# Patient Record
Sex: FEMALE | Race: WHITE | NOT HISPANIC OR LATINO | Employment: OTHER | URBAN - METROPOLITAN AREA
[De-identification: names, ages, dates, MRNs, and addresses within clinical notes are randomized per-mention and may not be internally consistent; named-entity substitution may affect disease eponyms.]

---

## 2017-01-03 ENCOUNTER — CLINICAL SUPPORT (OUTPATIENT)
Dept: INTERNAL MEDICINE | Facility: CLINIC | Age: 67
End: 2017-01-03

## 2017-01-03 DIAGNOSIS — E53.9 VITAMIN B DEFICIENCY: Primary | ICD-10-CM

## 2017-01-03 PROCEDURE — 96372 THER/PROPH/DIAG INJ SC/IM: CPT | Performed by: FAMILY MEDICINE

## 2017-01-03 RX ADMIN — CYANOCOBALAMIN 1000 MCG: 1000 INJECTION, SOLUTION INTRAMUSCULAR; SUBCUTANEOUS at 09:23

## 2017-01-20 ENCOUNTER — CLINICAL SUPPORT (OUTPATIENT)
Dept: INTERNAL MEDICINE | Facility: CLINIC | Age: 67
End: 2017-01-20

## 2017-01-20 DIAGNOSIS — E53.8 B12 DEFICIENCY: ICD-10-CM

## 2017-01-20 PROCEDURE — 96372 THER/PROPH/DIAG INJ SC/IM: CPT | Performed by: FAMILY MEDICINE

## 2017-01-20 RX ADMIN — CYANOCOBALAMIN 1000 MCG: 1000 INJECTION, SOLUTION INTRAMUSCULAR; SUBCUTANEOUS at 11:05

## 2017-02-03 ENCOUNTER — CLINICAL SUPPORT (OUTPATIENT)
Dept: INTERNAL MEDICINE | Facility: CLINIC | Age: 67
End: 2017-02-03

## 2017-02-03 DIAGNOSIS — E53.8 CYANOCOBALAMIN DEFICIENCY: Primary | ICD-10-CM

## 2017-02-03 PROCEDURE — 96372 THER/PROPH/DIAG INJ SC/IM: CPT | Performed by: FAMILY MEDICINE

## 2017-02-03 RX ORDER — CYANOCOBALAMIN 1000 UG/ML
1000 INJECTION, SOLUTION INTRAMUSCULAR; SUBCUTANEOUS
Status: DISCONTINUED | OUTPATIENT
Start: 2017-02-03 | End: 2017-05-12

## 2017-02-03 RX ADMIN — CYANOCOBALAMIN 1000 MCG: 1000 INJECTION, SOLUTION INTRAMUSCULAR; SUBCUTANEOUS at 10:48

## 2017-02-17 ENCOUNTER — CLINICAL SUPPORT (OUTPATIENT)
Dept: INTERNAL MEDICINE | Facility: CLINIC | Age: 67
End: 2017-02-17

## 2017-02-17 DIAGNOSIS — E53.8 B12 DEFICIENCY: ICD-10-CM

## 2017-02-17 PROCEDURE — 96372 THER/PROPH/DIAG INJ SC/IM: CPT | Performed by: FAMILY MEDICINE

## 2017-02-17 RX ADMIN — CYANOCOBALAMIN 1000 MCG: 1000 INJECTION, SOLUTION INTRAMUSCULAR; SUBCUTANEOUS at 10:51

## 2017-03-06 ENCOUNTER — CLINICAL SUPPORT (OUTPATIENT)
Dept: INTERNAL MEDICINE | Facility: CLINIC | Age: 67
End: 2017-03-06

## 2017-03-06 DIAGNOSIS — E53.8 B12 DEFICIENCY: Primary | ICD-10-CM

## 2017-03-06 PROCEDURE — 96372 THER/PROPH/DIAG INJ SC/IM: CPT | Performed by: FAMILY MEDICINE

## 2017-03-06 RX ADMIN — CYANOCOBALAMIN 1000 MCG: 1000 INJECTION, SOLUTION INTRAMUSCULAR; SUBCUTANEOUS at 09:45

## 2017-03-17 ENCOUNTER — CLINICAL SUPPORT (OUTPATIENT)
Dept: INTERNAL MEDICINE | Facility: CLINIC | Age: 67
End: 2017-03-17

## 2017-03-17 DIAGNOSIS — E53.8 B12 DEFICIENCY: Primary | ICD-10-CM

## 2017-03-17 PROCEDURE — 96372 THER/PROPH/DIAG INJ SC/IM: CPT | Performed by: FAMILY MEDICINE

## 2017-03-17 RX ADMIN — CYANOCOBALAMIN 1000 MCG: 1000 INJECTION, SOLUTION INTRAMUSCULAR; SUBCUTANEOUS at 09:49

## 2017-03-31 ENCOUNTER — CLINICAL SUPPORT (OUTPATIENT)
Dept: INTERNAL MEDICINE | Facility: CLINIC | Age: 67
End: 2017-03-31

## 2017-03-31 DIAGNOSIS — E53.8 B12 DEFICIENCY: Primary | ICD-10-CM

## 2017-03-31 PROCEDURE — 96372 THER/PROPH/DIAG INJ SC/IM: CPT | Performed by: FAMILY MEDICINE

## 2017-03-31 RX ADMIN — CYANOCOBALAMIN 1000 MCG: 1000 INJECTION, SOLUTION INTRAMUSCULAR; SUBCUTANEOUS at 09:19

## 2017-04-13 ENCOUNTER — CLINICAL SUPPORT (OUTPATIENT)
Dept: INTERNAL MEDICINE | Facility: CLINIC | Age: 67
End: 2017-04-13

## 2017-04-13 DIAGNOSIS — E53.9 VITAMIN B DEFICIENCY: Primary | ICD-10-CM

## 2017-04-13 PROCEDURE — 96372 THER/PROPH/DIAG INJ SC/IM: CPT | Performed by: FAMILY MEDICINE

## 2017-05-12 ENCOUNTER — CLINICAL SUPPORT (OUTPATIENT)
Dept: INTERNAL MEDICINE | Facility: CLINIC | Age: 67
End: 2017-05-12

## 2017-05-12 DIAGNOSIS — E53.8 VITAMIN B 12 DEFICIENCY: Primary | ICD-10-CM

## 2017-05-12 PROCEDURE — 96372 THER/PROPH/DIAG INJ SC/IM: CPT | Performed by: FAMILY MEDICINE

## 2017-05-12 RX ADMIN — CYANOCOBALAMIN 1000 MCG: 1000 INJECTION, SOLUTION INTRAMUSCULAR; SUBCUTANEOUS at 10:00

## 2017-06-23 ENCOUNTER — CLINICAL SUPPORT (OUTPATIENT)
Dept: INTERNAL MEDICINE | Facility: CLINIC | Age: 67
End: 2017-06-23

## 2017-06-23 DIAGNOSIS — E53.8 VITAMIN B12 DEFICIENCY: Primary | ICD-10-CM

## 2017-06-23 PROCEDURE — 96372 THER/PROPH/DIAG INJ SC/IM: CPT | Performed by: FAMILY MEDICINE

## 2017-06-23 RX ORDER — CYANOCOBALAMIN 1000 UG/ML
1000 INJECTION, SOLUTION INTRAMUSCULAR; SUBCUTANEOUS
Status: DISCONTINUED | OUTPATIENT
Start: 2017-06-23 | End: 2017-09-29 | Stop reason: SDUPTHER

## 2017-06-23 RX ADMIN — CYANOCOBALAMIN 1000 MCG: 1000 INJECTION, SOLUTION INTRAMUSCULAR; SUBCUTANEOUS at 10:01

## 2017-07-21 ENCOUNTER — CLINICAL SUPPORT (OUTPATIENT)
Dept: INTERNAL MEDICINE | Facility: CLINIC | Age: 67
End: 2017-07-21

## 2017-07-21 DIAGNOSIS — E53.8 VITAMIN B12 DEFICIENCY: Primary | ICD-10-CM

## 2017-07-21 PROCEDURE — 96372 THER/PROPH/DIAG INJ SC/IM: CPT | Performed by: FAMILY MEDICINE

## 2017-07-21 RX ADMIN — CYANOCOBALAMIN 1000 MCG: 1000 INJECTION, SOLUTION INTRAMUSCULAR; SUBCUTANEOUS at 09:13

## 2017-08-07 ENCOUNTER — CLINICAL SUPPORT (OUTPATIENT)
Dept: INTERNAL MEDICINE | Facility: CLINIC | Age: 67
End: 2017-08-07

## 2017-08-07 DIAGNOSIS — E53.8 VITAMIN B 12 DEFICIENCY: Primary | ICD-10-CM

## 2017-08-07 PROCEDURE — 96372 THER/PROPH/DIAG INJ SC/IM: CPT | Performed by: FAMILY MEDICINE

## 2017-08-07 RX ORDER — CYANOCOBALAMIN 1000 UG/ML
1000 INJECTION, SOLUTION INTRAMUSCULAR; SUBCUTANEOUS
Status: DISCONTINUED | OUTPATIENT
Start: 2017-08-07 | End: 2017-09-29 | Stop reason: SDUPTHER

## 2017-08-07 RX ADMIN — CYANOCOBALAMIN 1000 MCG: 1000 INJECTION, SOLUTION INTRAMUSCULAR; SUBCUTANEOUS at 08:53

## 2017-08-25 ENCOUNTER — CLINICAL SUPPORT (OUTPATIENT)
Dept: INTERNAL MEDICINE | Facility: CLINIC | Age: 67
End: 2017-08-25

## 2017-08-25 DIAGNOSIS — E53.8 VITAMIN B 12 DEFICIENCY: Primary | ICD-10-CM

## 2017-08-25 PROCEDURE — 96372 THER/PROPH/DIAG INJ SC/IM: CPT | Performed by: FAMILY MEDICINE

## 2017-08-25 RX ORDER — CYANOCOBALAMIN 1000 UG/ML
1000 INJECTION, SOLUTION INTRAMUSCULAR; SUBCUTANEOUS ONCE
Status: COMPLETED | OUTPATIENT
Start: 2017-08-25 | End: 2017-08-25

## 2017-08-25 RX ADMIN — CYANOCOBALAMIN 1000 MCG: 1000 INJECTION, SOLUTION INTRAMUSCULAR; SUBCUTANEOUS at 09:00

## 2017-09-27 ENCOUNTER — LAB (OUTPATIENT)
Dept: INTERNAL MEDICINE | Facility: CLINIC | Age: 67
End: 2017-09-27

## 2017-09-27 DIAGNOSIS — E03.8 OTHER SPECIFIED HYPOTHYROIDISM: ICD-10-CM

## 2017-09-27 DIAGNOSIS — E78.00 PURE HYPERCHOLESTEROLEMIA: ICD-10-CM

## 2017-09-27 DIAGNOSIS — Z00.00 ROUTINE GENERAL MEDICAL EXAMINATION AT A HEALTH CARE FACILITY: ICD-10-CM

## 2017-09-27 DIAGNOSIS — N28.9 RENAL INSUFFICIENCY, MILD: ICD-10-CM

## 2017-09-27 DIAGNOSIS — E08.29 DIABETES MELLITUS DUE TO UNDERLYING CONDITION WITH OTHER KIDNEY COMPLICATION: ICD-10-CM

## 2017-09-27 DIAGNOSIS — E53.8 B12 DEFICIENCY: Primary | ICD-10-CM

## 2017-09-27 LAB
25(OH)D3 SERPL-MCNC: 29.8 NG/ML
ALBUMIN SERPL-MCNC: 4.4 G/DL (ref 3.2–4.8)
ALBUMIN/GLOB SERPL: 1.4 G/DL (ref 1.5–2.5)
ALP SERPL-CCNC: 87 U/L (ref 25–100)
ALT SERPL W P-5'-P-CCNC: 22 U/L (ref 7–40)
ANION GAP SERPL CALCULATED.3IONS-SCNC: 9 MMOL/L (ref 3–11)
ARTICHOKE IGE QN: 90 MG/DL (ref 0–130)
AST SERPL-CCNC: 25 U/L (ref 0–33)
BILIRUB SERPL-MCNC: 0.5 MG/DL (ref 0.3–1.2)
BUN BLD-MCNC: 24 MG/DL (ref 9–23)
BUN/CREAT SERPL: 17.1 (ref 7–25)
CALCIUM SPEC-SCNC: 9.4 MG/DL (ref 8.7–10.4)
CHLORIDE SERPL-SCNC: 106 MMOL/L (ref 99–109)
CHOLEST SERPL-MCNC: 162 MG/DL (ref 0–200)
CO2 SERPL-SCNC: 24 MMOL/L (ref 20–31)
CREAT BLD-MCNC: 1.4 MG/DL (ref 0.6–1.3)
DEPRECATED RDW RBC AUTO: 44.6 FL (ref 37–54)
ERYTHROCYTE [DISTWIDTH] IN BLOOD BY AUTOMATED COUNT: 13.8 % (ref 11.3–14.5)
GFR SERPL CREATININE-BSD FRML MDRD: 37 ML/MIN/1.73
GLOBULIN UR ELPH-MCNC: 3.1 GM/DL
GLUCOSE BLD-MCNC: 89 MG/DL (ref 70–100)
HBA1C MFR BLD: 7.2 % (ref 4.8–5.6)
HCT VFR BLD AUTO: 39.3 % (ref 34.5–44)
HDLC SERPL-MCNC: 43 MG/DL (ref 40–60)
HGB BLD-MCNC: 12.8 G/DL (ref 11.5–15.5)
MCH RBC QN AUTO: 28.5 PG (ref 27–31)
MCHC RBC AUTO-ENTMCNC: 32.6 G/DL (ref 32–36)
MCV RBC AUTO: 87.5 FL (ref 80–99)
PLATELET # BLD AUTO: 212 10*3/MM3 (ref 150–450)
PMV BLD AUTO: 9.6 FL (ref 6–12)
POTASSIUM BLD-SCNC: 4.3 MMOL/L (ref 3.5–5.5)
PROT SERPL-MCNC: 7.5 G/DL (ref 5.7–8.2)
RBC # BLD AUTO: 4.49 10*6/MM3 (ref 3.89–5.14)
SODIUM BLD-SCNC: 139 MMOL/L (ref 132–146)
TRIGL SERPL-MCNC: 129 MG/DL (ref 0–150)
TSH SERPL DL<=0.05 MIU/L-ACNC: 0.44 MIU/ML (ref 0.35–5.35)
VIT B12 BLD-MCNC: 870 PG/ML (ref 211–911)
WBC NRBC COR # BLD: 8.25 10*3/MM3 (ref 3.5–10.8)

## 2017-09-27 PROCEDURE — 80053 COMPREHEN METABOLIC PANEL: CPT | Performed by: FAMILY MEDICINE

## 2017-09-27 PROCEDURE — 84443 ASSAY THYROID STIM HORMONE: CPT | Performed by: FAMILY MEDICINE

## 2017-09-27 PROCEDURE — 80061 LIPID PANEL: CPT | Performed by: FAMILY MEDICINE

## 2017-09-27 PROCEDURE — 82306 VITAMIN D 25 HYDROXY: CPT | Performed by: FAMILY MEDICINE

## 2017-09-27 PROCEDURE — 82607 VITAMIN B-12: CPT | Performed by: FAMILY MEDICINE

## 2017-09-27 PROCEDURE — 83036 HEMOGLOBIN GLYCOSYLATED A1C: CPT | Performed by: FAMILY MEDICINE

## 2017-09-27 PROCEDURE — 85027 COMPLETE CBC AUTOMATED: CPT | Performed by: FAMILY MEDICINE

## 2017-09-29 ENCOUNTER — OFFICE VISIT (OUTPATIENT)
Dept: INTERNAL MEDICINE | Facility: CLINIC | Age: 67
End: 2017-09-29

## 2017-09-29 VITALS
HEIGHT: 63 IN | DIASTOLIC BLOOD PRESSURE: 76 MMHG | SYSTOLIC BLOOD PRESSURE: 118 MMHG | TEMPERATURE: 97.5 F | WEIGHT: 205 LBS | BODY MASS INDEX: 36.32 KG/M2

## 2017-09-29 DIAGNOSIS — Z00.00 ANNUAL PHYSICAL EXAM: Primary | ICD-10-CM

## 2017-09-29 DIAGNOSIS — Z13.820 SCREENING FOR OSTEOPOROSIS: ICD-10-CM

## 2017-09-29 DIAGNOSIS — E53.8 B12 DEFICIENCY: ICD-10-CM

## 2017-09-29 DIAGNOSIS — E78.00 PURE HYPERCHOLESTEROLEMIA: ICD-10-CM

## 2017-09-29 PROCEDURE — 90662 IIV NO PRSV INCREASED AG IM: CPT | Performed by: FAMILY MEDICINE

## 2017-09-29 PROCEDURE — 99397 PER PM REEVAL EST PAT 65+ YR: CPT | Performed by: FAMILY MEDICINE

## 2017-09-29 PROCEDURE — G0008 ADMIN INFLUENZA VIRUS VAC: HCPCS | Performed by: FAMILY MEDICINE

## 2017-09-29 PROCEDURE — 96372 THER/PROPH/DIAG INJ SC/IM: CPT | Performed by: FAMILY MEDICINE

## 2017-09-29 PROCEDURE — G0009 ADMIN PNEUMOCOCCAL VACCINE: HCPCS | Performed by: FAMILY MEDICINE

## 2017-09-29 PROCEDURE — 90732 PPSV23 VACC 2 YRS+ SUBQ/IM: CPT | Performed by: FAMILY MEDICINE

## 2017-09-29 RX ORDER — ATORVASTATIN CALCIUM 80 MG/1
80 TABLET, FILM COATED ORAL DAILY
Qty: 90 TABLET | Refills: 3 | Status: SHIPPED | OUTPATIENT
Start: 2017-09-29 | End: 2018-10-03 | Stop reason: SDUPTHER

## 2017-09-29 RX ADMIN — CYANOCOBALAMIN 1000 MCG: 1000 INJECTION, SOLUTION INTRAMUSCULAR; SUBCUTANEOUS at 11:54

## 2017-10-05 ENCOUNTER — HOSPITAL ENCOUNTER (OUTPATIENT)
Dept: BONE DENSITY | Facility: HOSPITAL | Age: 67
Discharge: HOME | End: 2017-10-05
Attending: FAMILY MEDICINE | Admitting: FAMILY MEDICINE

## 2017-10-05 PROCEDURE — 77080 DXA BONE DENSITY AXIAL: CPT

## 2017-10-23 ENCOUNTER — CLINICAL SUPPORT (OUTPATIENT)
Dept: INTERNAL MEDICINE | Facility: CLINIC | Age: 67
End: 2017-10-23

## 2017-10-23 DIAGNOSIS — Z23 NEED FOR VACCINATION: Primary | ICD-10-CM

## 2017-10-23 PROCEDURE — 90715 TDAP VACCINE 7 YRS/> IM: CPT | Performed by: FAMILY MEDICINE

## 2017-10-23 PROCEDURE — 90736 HZV VACCINE LIVE SUBQ: CPT | Performed by: FAMILY MEDICINE

## 2017-10-23 PROCEDURE — 90472 IMMUNIZATION ADMIN EACH ADD: CPT | Performed by: FAMILY MEDICINE

## 2017-10-23 PROCEDURE — 90471 IMMUNIZATION ADMIN: CPT | Performed by: FAMILY MEDICINE

## 2017-10-23 PROCEDURE — 96372 THER/PROPH/DIAG INJ SC/IM: CPT | Performed by: FAMILY MEDICINE

## 2017-10-23 RX ADMIN — CYANOCOBALAMIN 1000 MCG: 1000 INJECTION, SOLUTION INTRAMUSCULAR; SUBCUTANEOUS at 12:52

## 2017-11-07 ENCOUNTER — TELEPHONE (OUTPATIENT)
Dept: INTERNAL MEDICINE | Facility: CLINIC | Age: 67
End: 2017-11-07

## 2017-11-07 DIAGNOSIS — D86.3 SARCOIDOSIS OF SKIN: Primary | ICD-10-CM

## 2017-11-07 NOTE — TELEPHONE ENCOUNTER
Spoke with Dr Joshua who advised pt had sarcoid rash on her face. Discussed that pt has seen rheumatology in past with neg w/u. Had labs 9/2017 with normal CBC and CMP (stable BUN, Cr). Will order a CXR and Tspot and do an EKG here.dennis

## 2017-11-07 NOTE — TELEPHONE ENCOUNTER
Dermatologist called regarding lesions on pt face. She stated that this will require additional work up and would like to touch base with you.

## 2017-11-20 ENCOUNTER — CLINICAL SUPPORT (OUTPATIENT)
Dept: INTERNAL MEDICINE | Facility: CLINIC | Age: 67
End: 2017-11-20

## 2017-11-20 DIAGNOSIS — E53.8 VITAMIN B 12 DEFICIENCY: Primary | ICD-10-CM

## 2017-11-20 PROCEDURE — 96372 THER/PROPH/DIAG INJ SC/IM: CPT | Performed by: FAMILY MEDICINE

## 2017-11-20 RX ADMIN — CYANOCOBALAMIN 1000 MCG: 1000 INJECTION, SOLUTION INTRAMUSCULAR; SUBCUTANEOUS at 11:07

## 2017-12-04 ENCOUNTER — CLINICAL SUPPORT (OUTPATIENT)
Dept: INTERNAL MEDICINE | Facility: CLINIC | Age: 67
End: 2017-12-04

## 2017-12-04 DIAGNOSIS — D86.3 SARCOIDOSIS OF SKIN: ICD-10-CM

## 2017-12-04 PROCEDURE — 86481 TB AG RESPONSE T-CELL SUSP: CPT | Performed by: FAMILY MEDICINE

## 2017-12-07 LAB — REF LAB TEST METHOD: NORMAL

## 2017-12-18 ENCOUNTER — CLINICAL SUPPORT (OUTPATIENT)
Dept: INTERNAL MEDICINE | Facility: CLINIC | Age: 67
End: 2017-12-18

## 2017-12-18 DIAGNOSIS — E53.8 B12 DEFICIENCY: Primary | ICD-10-CM

## 2017-12-18 PROCEDURE — 96372 THER/PROPH/DIAG INJ SC/IM: CPT | Performed by: FAMILY MEDICINE

## 2017-12-18 RX ADMIN — CYANOCOBALAMIN 1000 MCG: 1000 INJECTION, SOLUTION INTRAMUSCULAR; SUBCUTANEOUS at 12:52

## 2018-01-18 ENCOUNTER — CLINICAL SUPPORT (OUTPATIENT)
Dept: INTERNAL MEDICINE | Facility: CLINIC | Age: 68
End: 2018-01-18

## 2018-01-18 DIAGNOSIS — D86.3 DARIER-ROUSSY SARCOID: Primary | ICD-10-CM

## 2018-01-18 PROCEDURE — 96372 THER/PROPH/DIAG INJ SC/IM: CPT | Performed by: FAMILY MEDICINE

## 2018-01-18 RX ADMIN — CYANOCOBALAMIN 1000 MCG: 1000 INJECTION, SOLUTION INTRAMUSCULAR; SUBCUTANEOUS at 09:58

## 2018-02-19 ENCOUNTER — CLINICAL SUPPORT (OUTPATIENT)
Dept: INTERNAL MEDICINE | Facility: CLINIC | Age: 68
End: 2018-02-19

## 2018-02-19 DIAGNOSIS — E53.8 B12 DEFICIENCY: Primary | ICD-10-CM

## 2018-02-19 PROCEDURE — 96372 THER/PROPH/DIAG INJ SC/IM: CPT | Performed by: FAMILY MEDICINE

## 2018-02-19 RX ORDER — CYANOCOBALAMIN 1000 UG/ML
1000 INJECTION, SOLUTION INTRAMUSCULAR; SUBCUTANEOUS
Status: DISCONTINUED | OUTPATIENT
Start: 2018-02-19 | End: 2019-03-12

## 2018-02-19 RX ADMIN — CYANOCOBALAMIN 1000 MCG: 1000 INJECTION, SOLUTION INTRAMUSCULAR; SUBCUTANEOUS at 08:27

## 2018-03-19 ENCOUNTER — CLINICAL SUPPORT (OUTPATIENT)
Dept: INTERNAL MEDICINE | Facility: CLINIC | Age: 68
End: 2018-03-19

## 2018-03-19 PROCEDURE — 96372 THER/PROPH/DIAG INJ SC/IM: CPT | Performed by: FAMILY MEDICINE

## 2018-03-19 RX ORDER — NITROFURANTOIN 25; 75 MG/1; MG/1
100 CAPSULE ORAL EVERY 12 HOURS SCHEDULED
Qty: 14 CAPSULE | Refills: 0 | Status: SHIPPED | OUTPATIENT
Start: 2018-03-19 | End: 2018-03-23 | Stop reason: SDUPTHER

## 2018-03-19 RX ADMIN — CYANOCOBALAMIN 1000 MCG: 1000 INJECTION, SOLUTION INTRAMUSCULAR; SUBCUTANEOUS at 09:09

## 2018-03-23 RX ORDER — NITROFURANTOIN 25; 75 MG/1; MG/1
CAPSULE ORAL
Qty: 14 CAPSULE | Refills: 0 | Status: SHIPPED | OUTPATIENT
Start: 2018-03-23 | End: 2018-10-03

## 2018-04-02 PROCEDURE — 87086 URINE CULTURE/COLONY COUNT: CPT | Performed by: FAMILY MEDICINE

## 2018-04-23 ENCOUNTER — CLINICAL SUPPORT (OUTPATIENT)
Dept: INTERNAL MEDICINE | Facility: CLINIC | Age: 68
End: 2018-04-23

## 2018-04-23 PROCEDURE — 96372 THER/PROPH/DIAG INJ SC/IM: CPT | Performed by: FAMILY MEDICINE

## 2018-04-23 RX ADMIN — CYANOCOBALAMIN 1000 MCG: 1000 INJECTION, SOLUTION INTRAMUSCULAR; SUBCUTANEOUS at 09:49

## 2018-05-18 ENCOUNTER — CLINICAL SUPPORT (OUTPATIENT)
Dept: INTERNAL MEDICINE | Facility: CLINIC | Age: 68
End: 2018-05-18

## 2018-05-18 DIAGNOSIS — N18.30 CHRONIC KIDNEY DISEASE, STAGE III (MODERATE) (HCC): ICD-10-CM

## 2018-05-18 DIAGNOSIS — E11.29 UNCONTROLLED TYPE 2 DIABETES MELLITUS WITH MICROALBUMINURIA, UNSPECIFIED LONG TERM INSULIN USE STATUS: Primary | ICD-10-CM

## 2018-05-18 DIAGNOSIS — E11.65 UNCONTROLLED TYPE 2 DIABETES MELLITUS WITH MICROALBUMINURIA, UNSPECIFIED LONG TERM INSULIN USE STATUS: Primary | ICD-10-CM

## 2018-05-18 DIAGNOSIS — R80.9 UNCONTROLLED TYPE 2 DIABETES MELLITUS WITH MICROALBUMINURIA, UNSPECIFIED LONG TERM INSULIN USE STATUS: Primary | ICD-10-CM

## 2018-05-18 LAB
ANION GAP SERPL CALCULATED.3IONS-SCNC: 9 MMOL/L (ref 3–11)
BUN BLD-MCNC: 22 MG/DL (ref 9–23)
BUN/CREAT SERPL: 18.3 (ref 7–25)
CALCIUM SPEC-SCNC: 9.6 MG/DL (ref 8.7–10.4)
CHLORIDE SERPL-SCNC: 108 MMOL/L (ref 99–109)
CO2 SERPL-SCNC: 25 MMOL/L (ref 20–31)
CREAT BLD-MCNC: 1.2 MG/DL (ref 0.6–1.3)
GFR SERPL CREATININE-BSD FRML MDRD: 45 ML/MIN/1.73
GLUCOSE BLD-MCNC: 112 MG/DL (ref 70–100)
POTASSIUM BLD-SCNC: 4.8 MMOL/L (ref 3.5–5.5)
SODIUM BLD-SCNC: 142 MMOL/L (ref 132–146)

## 2018-05-18 PROCEDURE — 96372 THER/PROPH/DIAG INJ SC/IM: CPT | Performed by: FAMILY MEDICINE

## 2018-05-18 PROCEDURE — 80048 BASIC METABOLIC PNL TOTAL CA: CPT | Performed by: INTERNAL MEDICINE

## 2018-05-18 RX ADMIN — CYANOCOBALAMIN 1000 MCG: 1000 INJECTION, SOLUTION INTRAMUSCULAR; SUBCUTANEOUS at 11:23

## 2018-06-18 ENCOUNTER — CLINICAL SUPPORT (OUTPATIENT)
Dept: INTERNAL MEDICINE | Facility: CLINIC | Age: 68
End: 2018-06-18

## 2018-06-18 PROCEDURE — 96372 THER/PROPH/DIAG INJ SC/IM: CPT | Performed by: FAMILY MEDICINE

## 2018-06-18 RX ADMIN — CYANOCOBALAMIN 1000 MCG: 1000 INJECTION, SOLUTION INTRAMUSCULAR; SUBCUTANEOUS at 13:08

## 2018-07-20 ENCOUNTER — CLINICAL SUPPORT (OUTPATIENT)
Dept: INTERNAL MEDICINE | Facility: CLINIC | Age: 68
End: 2018-07-20

## 2018-07-20 PROCEDURE — 96372 THER/PROPH/DIAG INJ SC/IM: CPT | Performed by: FAMILY MEDICINE

## 2018-07-20 RX ADMIN — CYANOCOBALAMIN 1000 MCG: 1000 INJECTION, SOLUTION INTRAMUSCULAR; SUBCUTANEOUS at 08:53

## 2018-08-27 ENCOUNTER — CLINICAL SUPPORT (OUTPATIENT)
Dept: INTERNAL MEDICINE | Facility: CLINIC | Age: 68
End: 2018-08-27

## 2018-08-27 PROCEDURE — 96372 THER/PROPH/DIAG INJ SC/IM: CPT | Performed by: FAMILY MEDICINE

## 2018-08-27 RX ADMIN — CYANOCOBALAMIN 1000 MCG: 1000 INJECTION, SOLUTION INTRAMUSCULAR; SUBCUTANEOUS at 09:00

## 2018-08-30 ENCOUNTER — TELEPHONE (OUTPATIENT)
Dept: INTERNAL MEDICINE | Facility: CLINIC | Age: 68
End: 2018-08-30

## 2018-09-21 ENCOUNTER — CLINICAL SUPPORT (OUTPATIENT)
Dept: INTERNAL MEDICINE | Facility: CLINIC | Age: 68
End: 2018-09-21

## 2018-09-21 DIAGNOSIS — Z23 NEED FOR IMMUNIZATION AGAINST INFLUENZA: Primary | ICD-10-CM

## 2018-09-21 PROCEDURE — 96372 THER/PROPH/DIAG INJ SC/IM: CPT | Performed by: FAMILY MEDICINE

## 2018-09-21 PROCEDURE — 90662 IIV NO PRSV INCREASED AG IM: CPT | Performed by: FAMILY MEDICINE

## 2018-09-21 PROCEDURE — G0008 ADMIN INFLUENZA VIRUS VAC: HCPCS | Performed by: FAMILY MEDICINE

## 2018-09-21 RX ADMIN — CYANOCOBALAMIN 1000 MCG: 1000 INJECTION, SOLUTION INTRAMUSCULAR; SUBCUTANEOUS at 10:54

## 2018-10-03 ENCOUNTER — OFFICE VISIT (OUTPATIENT)
Dept: INTERNAL MEDICINE | Facility: CLINIC | Age: 68
End: 2018-10-03

## 2018-10-03 VITALS
DIASTOLIC BLOOD PRESSURE: 76 MMHG | SYSTOLIC BLOOD PRESSURE: 124 MMHG | BODY MASS INDEX: 36.07 KG/M2 | WEIGHT: 203.6 LBS | TEMPERATURE: 97.6 F | HEIGHT: 63 IN

## 2018-10-03 DIAGNOSIS — E78.00 PURE HYPERCHOLESTEROLEMIA: ICD-10-CM

## 2018-10-03 DIAGNOSIS — E53.8 B12 DEFICIENCY: ICD-10-CM

## 2018-10-03 DIAGNOSIS — Z00.00 ANNUAL PHYSICAL EXAM: Primary | ICD-10-CM

## 2018-10-03 LAB
ALBUMIN SERPL-MCNC: 4.6 G/DL (ref 3.2–4.8)
ALBUMIN/GLOB SERPL: 1.6 G/DL (ref 1.5–2.5)
ALP SERPL-CCNC: 82 U/L (ref 25–100)
ALT SERPL W P-5'-P-CCNC: 21 U/L (ref 7–40)
ANION GAP SERPL CALCULATED.3IONS-SCNC: 10 MMOL/L (ref 3–11)
ARTICHOKE IGE QN: 114 MG/DL (ref 0–130)
AST SERPL-CCNC: 16 U/L (ref 0–33)
BASOPHILS # BLD AUTO: 0 10*3/MM3 (ref 0–0.2)
BASOPHILS NFR BLD AUTO: 0 % (ref 0–1)
BILIRUB SERPL-MCNC: 0.5 MG/DL (ref 0.3–1.2)
BUN BLD-MCNC: 25 MG/DL (ref 9–23)
BUN/CREAT SERPL: 16.9 (ref 7–25)
CALCIUM SPEC-SCNC: 9.7 MG/DL (ref 8.7–10.4)
CHLORIDE SERPL-SCNC: 104 MMOL/L (ref 99–109)
CHOLEST SERPL-MCNC: 177 MG/DL (ref 0–200)
CO2 SERPL-SCNC: 24 MMOL/L (ref 20–31)
CREAT BLD-MCNC: 1.48 MG/DL (ref 0.6–1.3)
DEPRECATED RDW RBC AUTO: 44 FL (ref 37–54)
EOSINOPHIL # BLD AUTO: 0.39 10*3/MM3 (ref 0–0.3)
EOSINOPHIL NFR BLD AUTO: 4.3 % (ref 0–3)
ERYTHROCYTE [DISTWIDTH] IN BLOOD BY AUTOMATED COUNT: 13.8 % (ref 11.3–14.5)
GFR SERPL CREATININE-BSD FRML MDRD: 35 ML/MIN/1.73
GLOBULIN UR ELPH-MCNC: 2.8 GM/DL
GLUCOSE BLD-MCNC: 199 MG/DL (ref 70–100)
HCT VFR BLD AUTO: 41.2 % (ref 34.5–44)
HDLC SERPL-MCNC: 49 MG/DL (ref 40–60)
HGB BLD-MCNC: 13.2 G/DL (ref 11.5–15.5)
IMM GRANULOCYTES # BLD: 0.02 10*3/MM3 (ref 0–0.03)
IMM GRANULOCYTES NFR BLD: 0.2 % (ref 0–0.6)
LYMPHOCYTES # BLD AUTO: 2.71 10*3/MM3 (ref 0.6–4.8)
LYMPHOCYTES NFR BLD AUTO: 29.7 % (ref 24–44)
MCH RBC QN AUTO: 28.1 PG (ref 27–31)
MCHC RBC AUTO-ENTMCNC: 32 G/DL (ref 32–36)
MCV RBC AUTO: 87.7 FL (ref 80–99)
MONOCYTES # BLD AUTO: 0.64 10*3/MM3 (ref 0–1)
MONOCYTES NFR BLD AUTO: 7 % (ref 0–12)
NEUTROPHILS # BLD AUTO: 5.39 10*3/MM3 (ref 1.5–8.3)
NEUTROPHILS NFR BLD AUTO: 59 % (ref 41–71)
PLATELET # BLD AUTO: 255 10*3/MM3 (ref 150–450)
PMV BLD AUTO: 10.2 FL (ref 6–12)
POTASSIUM BLD-SCNC: 4.5 MMOL/L (ref 3.5–5.5)
PROT SERPL-MCNC: 7.4 G/DL (ref 5.7–8.2)
RBC # BLD AUTO: 4.7 10*6/MM3 (ref 3.89–5.14)
SODIUM BLD-SCNC: 138 MMOL/L (ref 132–146)
TRIGL SERPL-MCNC: 111 MG/DL (ref 0–150)
TSH SERPL DL<=0.05 MIU/L-ACNC: 0.17 MIU/ML (ref 0.35–5.35)
VIT B12 BLD-MCNC: 785 PG/ML (ref 211–911)
WBC NRBC COR # BLD: 9.13 10*3/MM3 (ref 3.5–10.8)

## 2018-10-03 PROCEDURE — 99397 PER PM REEVAL EST PAT 65+ YR: CPT | Performed by: FAMILY MEDICINE

## 2018-10-03 PROCEDURE — 80061 LIPID PANEL: CPT | Performed by: FAMILY MEDICINE

## 2018-10-03 PROCEDURE — 85025 COMPLETE CBC W/AUTO DIFF WBC: CPT | Performed by: FAMILY MEDICINE

## 2018-10-03 PROCEDURE — 90471 IMMUNIZATION ADMIN: CPT | Performed by: FAMILY MEDICINE

## 2018-10-03 PROCEDURE — 82607 VITAMIN B-12: CPT | Performed by: FAMILY MEDICINE

## 2018-10-03 PROCEDURE — 90670 PCV13 VACCINE IM: CPT | Performed by: FAMILY MEDICINE

## 2018-10-03 PROCEDURE — 80053 COMPREHEN METABOLIC PANEL: CPT | Performed by: FAMILY MEDICINE

## 2018-10-03 PROCEDURE — 84443 ASSAY THYROID STIM HORMONE: CPT | Performed by: FAMILY MEDICINE

## 2018-10-03 RX ORDER — ATORVASTATIN CALCIUM 80 MG/1
80 TABLET, FILM COATED ORAL DAILY
Qty: 90 TABLET | Refills: 3 | Status: SHIPPED | OUTPATIENT
Start: 2018-10-03 | End: 2021-02-17 | Stop reason: SDUPTHER

## 2018-10-03 NOTE — PROGRESS NOTES
Subjective   Mendy Merritt is a 68 y.o. female.   History of Present Illness   Here for CPE. Last seen 9/29/17 for CPE.  Was seen 2/8/16 with sinusitis, treated with biaxin. Pt sees endocrinology. Labs 9/29/17 demo normal CBC, CMP (glu 89, Cr 1.4), TSH (0.443 on synthroid), B12 (870 on shots q2wk), vit D 29.8. Lipid at goal on lipitor with , tg 129, HDL 43, LDL 90. A1C was 7.2. Last EKG 6/2012 normal.     1.ENDO- B12 defic, hypothyroid and NIDDM. Pt was getting B12 shots here q2wk. Otherwise has treated with endo. Was released by nephrology when her renal function improved with adequate diabetes control. Is on losartan for renal protection. Had neg w/u by rheumatology (+CESAR but otherwise neg). B12 has been at goal. Today she reports she is now doing her shots monthly and doing well. Last was about a week ago. Does need her TSH drawn and sent to Dr Dow.      2. CV- hyperlipid. At goal on Lipitor. No known cardio sequelae. Is on cozaar for renal protection. Today pt reports she is still on lipitor with no cardio symptoms.      3. CPE- previous 9/29/17  LMP: 54yo. No DUB or persistent hot flashes.  Last mammo: [place] in 1998. Had a bad experience and has declined since then.  No current vaginal, bladder or breast c/o.  Previous BDT: 10/4/17 normal  Previous colonoscopy: 52yo in [place]. No polyps. Cologuard 11/14/17 neg  Immunizations: Last Tdap: 10/23/17. Pneumovax: 9/29/17. Prevnar 13: DUE. Zostavax: 10/23/17.  Had shingles around 2014. Had her flu shot this year.    The following portions of the patient's history were reviewed and updated as appropriate: current medications, past family history, past medical history, past social history, past surgical history and problem list.    Review of Systems   Cardiovascular: Negative for chest pain.   Gastrointestinal: Negative for abdominal distention and abdominal pain.   Skin: Negative for color change.   Neurological: Negative for tremors, speech difficulty and headaches.  "  Psychiatric/Behavioral: Negative for agitation and confusion.   All other systems reviewed and are negative.        Current Outpatient Prescriptions:   •  aspirin 81 MG EC tablet, Take  by mouth., Disp: , Rfl:   •  atorvastatin (LIPITOR) 80 MG tablet, Take 1 tablet by mouth Daily., Disp: 90 tablet, Rfl: 3  •  clobetasol (TEMOVATE) 0.05 % ointment, Apply  topically 2 (two) times a day., Disp: , Rfl:   •  diclofenac (VOLTAREN) 1 % gel gel, Apply 4g to area of pain or inflammation q8hr prn, Disp: 100 g, Rfl: 0  •  FREESTYLE LITE test strip, USE 3 TIMES A DAY AS DIRECTED, Disp: , Rfl: 3  •  glipiZIDE (GLUCOTROL) 10 MG 24 hr tablet, Take  by mouth., Disp: , Rfl:   •  levothyroxine (SYNTHROID, LEVOTHROID) 137 MCG tablet, Take  by mouth., Disp: , Rfl:   •  losartan (COZAAR) 50 MG tablet, , Disp: , Rfl:   •  metFORMIN XR (GLUCOPHATE-XR) 500 MG 24 hr tablet, Take  by mouth., Disp: , Rfl:   •  TRESIBA FLEXTOUCH 100 UNIT/ML solution pen-injector, INJECT 28 UNITS EVERY EVENING TITRATE UP AS DIRECTED TO MAX OF 45 UNITS EVERY DAY, Disp: , Rfl: 5  •  VICTOZA 18 MG/3ML solution pen-injector, , Disp: , Rfl:     Current Facility-Administered Medications:   •  cyanocobalamin injection 1,000 mcg, 1,000 mcg, Intramuscular, Q14 Days, Akilah Becker MD, 1,000 mcg at 09/21/18 1054  •  cyanocobalamin injection 1,000 mcg, 1,000 mcg, Intramuscular, Q28 Days, Akilah Becker MD, 1,000 mcg at 06/18/18 1308    Objective     /76   Temp 97.6 °F (36.4 °C)   Ht 160 cm (63\")   Wt 92.4 kg (203 lb 9.6 oz)   BMI 36.07 kg/m²     Physical Exam   Constitutional: She is oriented to person, place, and time. She appears well-developed and well-nourished.   HENT:   Head: Normocephalic.   Right Ear: Tympanic membrane and ear canal normal.   Left Ear: Tympanic membrane and ear canal normal.   Nose: Nose normal.   Mouth/Throat: Oropharynx is clear and moist.   Eyes: Pupils are equal, round, and reactive to light. Conjunctivae and EOM are " normal.   Neck: Normal range of motion. Neck supple. No thyromegaly present.   Cardiovascular: Normal rate, regular rhythm and normal heart sounds.    Pulmonary/Chest: Effort normal and breath sounds normal. Right breast exhibits no mass, no nipple discharge and no skin change. Left breast exhibits no mass, no nipple discharge and no skin change.   Abdominal: Soft. Bowel sounds are normal. She exhibits no distension. There is no tenderness.   Musculoskeletal: Normal range of motion.   Lymphadenopathy:     She has no cervical adenopathy.   Neurological: She is alert and oriented to person, place, and time.   Skin: Skin is warm and dry.   Psychiatric: She has a normal mood and affect. Her behavior is normal.   Nursing note and vitals reviewed.      Reviewed the following with the patient: Discussion today with patient regarding current guidelines for timing/ frequency of paps, mammograms, colonoscopy (or cologuard), bone density tests and lab tests.     Immunizations discussed today with current recommendations advised for DTaP, Zostavax, Pneumovax and Prevnar 13.    Appropriate diet and stretching discussed with handouts provided.      Assessment/Plan   Mendy was seen today for annual exam.    Diagnoses and all orders for this visit:    Annual physical exam  -     CBC & Differential  -     Comprehensive Metabolic Panel  -     Lipid Panel  -     Vitamin B12  -     CBC Auto Differential  -     Pneumococcal Conjugate Vaccine 13-[facility] All  -     TSH    B12 deficiency    Pure hypercholesterolemia  -     atorvastatin (LIPITOR) 80 MG tablet; Take 1 tablet by mouth Daily.      1. CPE- screening labs today. Prevnar 13 today which will complete her pneumonia shots.   2. ENDO- B12 defic- will check her level today. Pt is then to try the OTC sublingual B12. Will then recheck her B12 in 1mo (lab only). If not at goal, will return to the shots. Discussed the zostarix shot (series of 2, done at pharmacy for shingles).  3. CV-  hyperlipid- will check her lipid today. Expect it to be at goal. Will RF x1yr.

## 2018-10-03 NOTE — PATIENT INSTRUCTIONS
1. CPE- screening labs today. Prevnar 13 today which will complete her pneumonia shots.   2. ENDO- B12 defic- will check her level today. Pt is then to try the OTC sublingual B12. Will then recheck her B12 in 1mo (lab only). If not at goal, will return to the shots. Discussed the zostarix shot (series of 2, done at pharmacy for shingles).  3. CV- hyperlipid- will check her lipid today. Expect it to be at goal. Will RF x1yr.

## 2018-11-09 ENCOUNTER — TELEPHONE (OUTPATIENT)
Dept: INTERNAL MEDICINE | Facility: CLINIC | Age: 68
End: 2018-11-09

## 2018-11-09 NOTE — TELEPHONE ENCOUNTER
Pt called stating that she rec'd the prevnar 13 vaccination on 10/3/18 and still has a knot on her arm. Pt states that the day after the injection the knot was the size of a hard boiled egg at injection site. She says that the size of the knot is now more like a grain but she wanted to be sure that it was documented in her chart.

## 2018-11-13 ENCOUNTER — OFFICE VISIT (OUTPATIENT)
Dept: INTERNAL MEDICINE | Facility: CLINIC | Age: 68
End: 2018-11-13

## 2018-11-13 VITALS — HEIGHT: 63 IN | BODY MASS INDEX: 35.79 KG/M2 | WEIGHT: 202 LBS | TEMPERATURE: 97.7 F

## 2018-11-13 DIAGNOSIS — J30.9 CHRONIC ALLERGIC RHINITIS: ICD-10-CM

## 2018-11-13 DIAGNOSIS — R21 RASH: Primary | ICD-10-CM

## 2018-11-13 PROCEDURE — 99213 OFFICE O/P EST LOW 20 MIN: CPT | Performed by: FAMILY MEDICINE

## 2018-11-13 NOTE — PATIENT INSTRUCTIONS
1. RESP- allergies- advised to use xyzal to stop the PND. Advised to use mucinex DM for the already present mucinex and cough. To call if phlegm turns discolored and will send in antibiotic.   2. DERM- rash- discussed that her rash may be a butterfly rash which would be c/w Lupus. She needs to return to Derm for a biopsy now.   3. RECHECK- prn

## 2018-11-13 NOTE — PROGRESS NOTES
Subjective   Mendy Merritt is a 68 y.o. female.     History of Present Illness   Here today as a work in for cough and facial swelling. Last seen 10/3/18 for CPE and annual routine. Pt sees endo for NIDDM and hypothyroid.     RESP- today pt reports that her arm swelling 5wk ago after her prevnar-13 shot. Tends to react to shots. She has been coughing for 9-10 days. Is having a hard time clearing the phlegm but it is clear when she does. No sinus symptoms. Has not taken anything for the cough.     DERM- facial rash. Today pt reports her rosacea is flaring and not responding to metrogel. She has clobetasol for other issues and she used this here and it seems to help some.    The following portions of the patient's history were reviewed and updated as appropriate: current medications, past family history, past medical history, past social history, past surgical history and problem list.    Review of Systems   HENT: Positive for facial swelling.    Respiratory: Positive for cough.    Cardiovascular: Negative for chest pain.   Gastrointestinal: Negative for abdominal distention and abdominal pain.   Skin: Negative for color change.   Neurological: Negative for tremors, speech difficulty and headaches.   Psychiatric/Behavioral: Negative for agitation and confusion.   All other systems reviewed and are negative.        Current Outpatient Medications:   •  aspirin 81 MG EC tablet, Take  by mouth., Disp: , Rfl:   •  atorvastatin (LIPITOR) 80 MG tablet, Take 1 tablet by mouth Daily., Disp: 90 tablet, Rfl: 3  •  clobetasol (TEMOVATE) 0.05 % ointment, Apply  topically 2 (two) times a day., Disp: , Rfl:   •  diclofenac (VOLTAREN) 1 % gel gel, Apply 4g to area of pain or inflammation q8hr prn, Disp: 100 g, Rfl: 0  •  FREESTYLE LITE test strip, USE 3 TIMES A DAY AS DIRECTED, Disp: , Rfl: 3  •  glipiZIDE (GLUCOTROL) 10 MG 24 hr tablet, Take  by mouth., Disp: , Rfl:   •  levothyroxine (SYNTHROID, LEVOTHROID) 137 MCG tablet, Take  by  "mouth., Disp: , Rfl:   •  losartan (COZAAR) 50 MG tablet, , Disp: , Rfl:   •  metFORMIN XR (GLUCOPHATE-XR) 500 MG 24 hr tablet, Take  by mouth., Disp: , Rfl:   •  TRESIBA FLEXTOUCH 100 UNIT/ML solution pen-injector, INJECT 28 UNITS EVERY EVENING TITRATE UP AS DIRECTED TO MAX OF 45 UNITS EVERY DAY, Disp: , Rfl: 5  •  VICTOZA 18 MG/3ML solution pen-injector, , Disp: , Rfl:     Current Facility-Administered Medications:   •  cyanocobalamin injection 1,000 mcg, 1,000 mcg, Intramuscular, Q14 Days, Akilah Becker MD, 1,000 mcg at 09/21/18 1054  •  cyanocobalamin injection 1,000 mcg, 1,000 mcg, Intramuscular, Q28 Days, Akilah Becker MD, 1,000 mcg at 06/18/18 1308    Objective     Temp 97.7 °F (36.5 °C)   Ht 160 cm (63\")   Wt 91.6 kg (202 lb)   BMI 35.78 kg/m²     Physical Exam   Constitutional: She is oriented to person, place, and time. She appears well-developed and well-nourished.   HENT:   Right Ear: Tympanic membrane and ear canal normal.   Left Ear: Tympanic membrane and ear canal normal.   Mouth/Throat: Oropharynx is clear and moist.   Eyes: Conjunctivae and EOM are normal. Pupils are equal, round, and reactive to light.   Neck: No thyromegaly present.   Cardiovascular: Normal rate and regular rhythm.   Pulmonary/Chest: Effort normal and breath sounds normal.   Neurological: She is alert and oriented to person, place, and time.   Skin: Skin is warm and dry.   Rash across nose to cheeks that is violaceous, worse on left   Psychiatric: She has a normal mood and affect.   Vitals reviewed.      Assessment/Plan   Mendy was seen today for cough.    Diagnoses and all orders for this visit:    Rash    Chronic allergic rhinitis        1. RESP- allergies- advised to use xyzal to stop the PND. Advised to use mucinex DM for the already present mucinex and cough. To call if phlegm turns discolored and will send in antibiotic.   2. DERM- rash- discussed that her rash may be a butterfly rash which would be c/w " Lupus. She needs to return to Derm for a biopsy now.   3. RECHECK- prn

## 2019-02-04 ENCOUNTER — TELEPHONE (OUTPATIENT)
Dept: INTERNAL MEDICINE | Facility: CLINIC | Age: 69
End: 2019-02-04

## 2019-02-04 NOTE — TELEPHONE ENCOUNTER
Pt is taking mucinex dm and said its not working too much and wanted to know what else she should do or what should she take? Please give pt a call.

## 2019-02-07 ENCOUNTER — OFFICE VISIT (OUTPATIENT)
Dept: INTERNAL MEDICINE | Facility: CLINIC | Age: 69
End: 2019-02-07

## 2019-02-07 ENCOUNTER — TELEPHONE (OUTPATIENT)
Dept: INTERNAL MEDICINE | Facility: CLINIC | Age: 69
End: 2019-02-07

## 2019-02-07 VITALS
BODY MASS INDEX: 35.65 KG/M2 | HEART RATE: 91 BPM | TEMPERATURE: 97.4 F | OXYGEN SATURATION: 97 % | HEIGHT: 63 IN | WEIGHT: 201.2 LBS

## 2019-02-07 DIAGNOSIS — J40 BRONCHITIS: Primary | ICD-10-CM

## 2019-02-07 PROCEDURE — 96372 THER/PROPH/DIAG INJ SC/IM: CPT | Performed by: FAMILY MEDICINE

## 2019-02-07 PROCEDURE — 99213 OFFICE O/P EST LOW 20 MIN: CPT | Performed by: FAMILY MEDICINE

## 2019-02-07 RX ORDER — FLUCONAZOLE 150 MG/1
150 TABLET ORAL ONCE
Qty: 1 TABLET | Refills: 0 | Status: SHIPPED | OUTPATIENT
Start: 2019-02-07 | End: 2019-02-07

## 2019-02-07 RX ORDER — ALBUTEROL SULFATE 90 UG/1
AEROSOL, METERED RESPIRATORY (INHALATION)
Qty: 18 G | Refills: 0 | Status: SHIPPED | OUTPATIENT
Start: 2019-02-07 | End: 2019-10-22

## 2019-02-07 RX ORDER — TRIAMCINOLONE ACETONIDE 40 MG/ML
40 INJECTION, SUSPENSION INTRA-ARTICULAR; INTRAMUSCULAR ONCE
Status: COMPLETED | OUTPATIENT
Start: 2019-02-07 | End: 2019-02-07

## 2019-02-07 RX ADMIN — TRIAMCINOLONE ACETONIDE 40 MG: 40 INJECTION, SUSPENSION INTRA-ARTICULAR; INTRAMUSCULAR at 11:59

## 2019-02-07 NOTE — TELEPHONE ENCOUNTER
[facility] CALLED AND WANTED TO SPEAK WITH YOU ABOUT THE PATIENTS PRESCRIPTION BEING A INTERACTION WITH HER ATORVASTATIN. PLEASE GIVE [facility] A CALL

## 2019-02-07 NOTE — TELEPHONE ENCOUNTER
I spoke with pharmacist and advised that pt has been told to hold atorvastatin while taking biaxin and rx is okay to fill.

## 2019-02-07 NOTE — PATIENT INSTRUCTIONS
1. RESP- bronchitis with wheezing. Will treat with kenalog, biaxin and inhaler. Will cover with diflucan. Pt advised to hold the atorvastatin for the week she is on the antibiotic.   2. RECHECK- prn

## 2019-02-07 NOTE — PROGRESS NOTES
Subjective   Mendy Merritt is a 68 y.o. female.     History of Present Illness   Here today as a work in for cough. Last seen 11/13/18 as a work in for cough and facial rash.     RESP- pt was seen 11/13/18 for cough for 10 days. No sinus symptoms other than PND. Phlegm was clear. Was advised xyzal. Today pt reports she has been sick for 3wk. Was getting better but then got worse again tis past weekend. Has rib soreness from coughing so much; mostly non productive. Nasal discharge is yellow. Feels congested in her throat. No wheezing or dyspnea.     The following portions of the patient's history were reviewed and updated as appropriate: current medications, past family history, past medical history, past social history, past surgical history and problem list.    Review of Systems   Constitutional: Positive for appetite change (loss of appetite).   HENT: Positive for rhinorrhea.    Respiratory: Positive for cough.    Cardiovascular: Negative for chest pain.   Gastrointestinal: Negative for abdominal distention and abdominal pain.   Skin: Negative for color change.   Neurological: Negative for tremors, speech difficulty and headaches.   Psychiatric/Behavioral: Negative for agitation and confusion.   All other systems reviewed and are negative.         Current Outpatient Medications:   •  albuterol sulfate  (90 Base) MCG/ACT inhaler, 2 puffs q4-6 hr prn cough, shortness of breath or wheezing, Disp: 18 g, Rfl: 0  •  aspirin 81 MG EC tablet, Take  by mouth., Disp: , Rfl:   •  atorvastatin (LIPITOR) 80 MG tablet, Take 1 tablet by mouth Daily., Disp: 90 tablet, Rfl: 3  •  clarithromycin XL (BIAXIN XL) 500 MG 24 hr tablet, Take 2 tablets by mouth Daily., Disp: 14 tablet, Rfl: 0  •  clobetasol (TEMOVATE) 0.05 % ointment, Apply  topically 2 (two) times a day., Disp: , Rfl:   •  diclofenac (VOLTAREN) 1 % gel gel, Apply 4g to area of pain or inflammation q8hr prn, Disp: 100 g, Rfl: 0  •  fluconazole (DIFLUCAN) 150 MG  "tablet, Take 1 tablet by mouth 1 (One) Time for 1 dose., Disp: 1 tablet, Rfl: 0  •  FREESTYLE LITE test strip, USE 3 TIMES A DAY AS DIRECTED, Disp: , Rfl: 3  •  glipiZIDE (GLUCOTROL) 10 MG 24 hr tablet, Take  by mouth., Disp: , Rfl:   •  levothyroxine (SYNTHROID, LEVOTHROID) 137 MCG tablet, Take  by mouth., Disp: , Rfl:   •  losartan (COZAAR) 50 MG tablet, , Disp: , Rfl:   •  metFORMIN XR (GLUCOPHATE-XR) 500 MG 24 hr tablet, Take  by mouth., Disp: , Rfl:   •  TRESIBA FLEXTOUCH 100 UNIT/ML solution pen-injector, INJECT 28 UNITS EVERY EVENING TITRATE UP AS DIRECTED TO MAX OF 45 UNITS EVERY DAY, Disp: , Rfl: 5  •  VICTOZA 18 MG/3ML solution pen-injector, , Disp: , Rfl:     Current Facility-Administered Medications:   •  cyanocobalamin injection 1,000 mcg, 1,000 mcg, Intramuscular, Q14 Days, Akilah Becker MD, 1,000 mcg at 09/21/18 1054  •  cyanocobalamin injection 1,000 mcg, 1,000 mcg, Intramuscular, Q28 Days, Akilah Becker MD, 1,000 mcg at 06/18/18 1308  •  triamcinolone acetonide (KENALOG-40) injection 40 mg, 40 mg, Intramuscular, Once, Akilah Becker MD    Objective     Pulse 91   Temp 97.4 °F (36.3 °C)   Ht 160 cm (63\")   Wt 91.3 kg (201 lb 3.2 oz)   SpO2 97%   BMI 35.64 kg/m²     Physical Exam   Constitutional: She is oriented to person, place, and time. She appears well-developed and well-nourished.   HENT:   Right Ear: Tympanic membrane and ear canal normal.   Left Ear: Tympanic membrane and ear canal normal.   Mouth/Throat: Oropharynx is clear and moist.   Eyes: Conjunctivae and EOM are normal. Pupils are equal, round, and reactive to light.   Neck: No thyromegaly present.   Cardiovascular: Normal rate and regular rhythm.   Pulmonary/Chest: Effort normal. She has wheezes.   Neurological: She is alert and oriented to person, place, and time.   Skin: Skin is warm and dry.   Psychiatric: She has a normal mood and affect.   Vitals reviewed.      Assessment/Plan   Mendy was seen today for " cough.    Diagnoses and all orders for this visit:    Bronchitis  -     triamcinolone acetonide (KENALOG-40) injection 40 mg; Inject 1 mL into the appropriate muscle as directed by prescriber 1 (One) Time.  -     albuterol sulfate  (90 Base) MCG/ACT inhaler; 2 puffs q4-6 hr prn cough, shortness of breath or wheezing  -     clarithromycin XL (BIAXIN XL) 500 MG 24 hr tablet; Take 2 tablets by mouth Daily.  -     fluconazole (DIFLUCAN) 150 MG tablet; Take 1 tablet by mouth 1 (One) Time for 1 dose.        1. RESP- bronchitis with wheezing. Will treat with kenalog, biaxin and inhaler. Will cover with diflucan. Pt advised to hold the atorvastatin for the week she is on the antibiotic.   2. RECHECK- prn

## 2019-02-15 ENCOUNTER — TELEPHONE (OUTPATIENT)
Dept: INTERNAL MEDICINE | Facility: CLINIC | Age: 69
End: 2019-02-15

## 2019-02-15 ENCOUNTER — OFFICE VISIT (OUTPATIENT)
Dept: INTERNAL MEDICINE | Facility: CLINIC | Age: 69
End: 2019-02-15

## 2019-02-15 VITALS
BODY MASS INDEX: 35.61 KG/M2 | TEMPERATURE: 97.2 F | WEIGHT: 201 LBS | HEART RATE: 106 BPM | HEIGHT: 63 IN | OXYGEN SATURATION: 96 %

## 2019-02-15 DIAGNOSIS — J40 BRONCHITIS: ICD-10-CM

## 2019-02-15 PROCEDURE — 99213 OFFICE O/P EST LOW 20 MIN: CPT | Performed by: FAMILY MEDICINE

## 2019-02-15 RX ORDER — METHYLPREDNISOLONE 4 MG/1
4 TABLET ORAL DAILY
Qty: 5 TABLET | Refills: 0 | Status: SHIPPED | OUTPATIENT
Start: 2019-02-15 | End: 2019-03-12

## 2019-02-15 RX ORDER — CANAGLIFLOZIN 100 MG/1
TABLET, FILM COATED ORAL
Refills: 1 | COMMUNITY
Start: 2019-01-23 | End: 2019-11-05

## 2019-02-15 NOTE — PROGRESS NOTES
Subjective   Mendy Merritt is a 68 y.o. female.     History of Present Illness   Here today as a work in for cough and congestion. Last seen 2/7/19 for bronchitis with wheezing, treated with biasin, kenalog and inhaler.    RESP- today pt reports she is improving but now she has laryngitis. No sinus congestion but still has PND. Coughed up some green yesterday, otherwise it has been clear.    The following portions of the patient's history were reviewed and updated as appropriate: current medications, past family history, past medical history, past social history, past surgical history and problem list.    Review of Systems   HENT: Positive for congestion.    Respiratory: Positive for cough.    Cardiovascular: Negative for chest pain.   Gastrointestinal: Negative for abdominal distention and abdominal pain.   Skin: Negative for color change.   Neurological: Negative for tremors, speech difficulty and headaches.   Psychiatric/Behavioral: Negative for agitation and confusion.   All other systems reviewed and are negative.        Current Outpatient Medications:   •  albuterol sulfate  (90 Base) MCG/ACT inhaler, 2 puffs q4-6 hr prn cough, shortness of breath or wheezing, Disp: 18 g, Rfl: 0  •  aspirin 81 MG EC tablet, Take  by mouth., Disp: , Rfl:   •  atorvastatin (LIPITOR) 80 MG tablet, Take 1 tablet by mouth Daily., Disp: 90 tablet, Rfl: 3  •  clarithromycin XL (BIAXIN XL) 500 MG 24 hr tablet, Take 2 tablets by mouth Daily., Disp: 14 tablet, Rfl: 0  •  clobetasol (TEMOVATE) 0.05 % ointment, Apply  topically 2 (two) times a day., Disp: , Rfl:   •  diclofenac (VOLTAREN) 1 % gel gel, Apply 4g to area of pain or inflammation q8hr prn, Disp: 100 g, Rfl: 0  •  FREESTYLE LITE test strip, USE 3 TIMES A DAY AS DIRECTED, Disp: , Rfl: 3  •  glipiZIDE (GLUCOTROL) 10 MG 24 hr tablet, Take  by mouth., Disp: , Rfl:   •  INVOKANA 100 MG tablet, TAKE 1 TABLET BY MOUTH EVERY DAY IN THE MORNING, Disp: , Rfl: 1  •  levothyroxine  "(SYNTHROID, LEVOTHROID) 137 MCG tablet, Take  by mouth., Disp: , Rfl:   •  losartan (COZAAR) 50 MG tablet, , Disp: , Rfl:   •  metFORMIN XR (GLUCOPHATE-XR) 500 MG 24 hr tablet, Take  by mouth., Disp: , Rfl:   •  methylPREDNISolone (MEDROL) 4 MG tablet, Take 1 tablet by mouth Daily., Disp: 5 tablet, Rfl: 0  •  TRESIBA FLEXTOUCH 100 UNIT/ML solution pen-injector, INJECT 28 UNITS EVERY EVENING TITRATE UP AS DIRECTED TO MAX OF 45 UNITS EVERY DAY, Disp: , Rfl: 5  •  triazolam (HALCION) 0.25 MG tablet, TAKE 2 TABLETS BY MOUTH 1 HOUR PRIOR TO DENTAL VISIT, Disp: , Rfl: 0  •  VICTOZA 18 MG/3ML solution pen-injector, , Disp: , Rfl:     Current Facility-Administered Medications:   •  cyanocobalamin injection 1,000 mcg, 1,000 mcg, Intramuscular, Q14 Days, Akilah Becker MD, 1,000 mcg at 09/21/18 1054  •  cyanocobalamin injection 1,000 mcg, 1,000 mcg, Intramuscular, Q28 Days, Akilah Becker MD, 1,000 mcg at 06/18/18 1308    Objective     Pulse 106   Temp 97.2 °F (36.2 °C)   Ht 160 cm (63\")   Wt 91.2 kg (201 lb)   SpO2 96%   BMI 35.61 kg/m²     Physical Exam   Constitutional: She is oriented to person, place, and time. She appears well-developed and well-nourished.   HENT:   Right Ear: Tympanic membrane and ear canal normal.   Left Ear: Tympanic membrane and ear canal normal.   Mouth/Throat: Oropharynx is clear and moist.   Eyes: Conjunctivae and EOM are normal. Pupils are equal, round, and reactive to light.   Neck: No thyromegaly present.   Cardiovascular: Normal rate and regular rhythm.   Pulmonary/Chest: Effort normal and breath sounds normal.   Neurological: She is alert and oriented to person, place, and time.   Skin: Skin is warm and dry.   Psychiatric: She has a normal mood and affect.   Vitals reviewed.      Assessment/Plan   Mendy was seen today for illness.    Diagnoses and all orders for this visit:    Bronchitis  -     clarithromycin XL (BIAXIN XL) 500 MG 24 hr tablet; Take 2 tablets by mouth " Daily.  -     methylPREDNISolone (MEDROL) 4 MG tablet; Take 1 tablet by mouth Daily.        1. RESP- sinusitis- will repeat the biaxin and pt will continue to hold the lipitor while on the antibiotic. Will provided with 5 days of medrol to use prn.  2. RECHECK- prn

## 2019-02-15 NOTE — TELEPHONE ENCOUNTER
Pt is just wanting to no if Dr. Becker can take a listen to her because she dont think she has cleared up from her bronchitis from last Friday.

## 2019-02-15 NOTE — PATIENT INSTRUCTIONS
1. RESP- sinusitis- will repeat the biaxin and pt will continue to hold the lipitor while on the antibiotic. Will provided with 5 days of medrol to use prn.  2. RECHECK- prn

## 2019-03-11 ENCOUNTER — TELEPHONE (OUTPATIENT)
Dept: INTERNAL MEDICINE | Facility: CLINIC | Age: 69
End: 2019-03-11

## 2019-03-11 NOTE — TELEPHONE ENCOUNTER
"Please ask pt if she can come in for an appointment at 2:30 tomorrow as she will have to be seen by Dr. Becker as this has been ongoing. Just let me know and I\"ll put her on the schedule. Thanks!   "

## 2019-03-11 NOTE — TELEPHONE ENCOUNTER
Pt is calling because  something in her mouth feels sliky inside and can taste anything. Pt said that her cold is coming back. Pt wants you to call her back

## 2019-03-12 ENCOUNTER — TELEPHONE (OUTPATIENT)
Dept: INTERNAL MEDICINE | Facility: CLINIC | Age: 69
End: 2019-03-12

## 2019-03-12 ENCOUNTER — OFFICE VISIT (OUTPATIENT)
Dept: INTERNAL MEDICINE | Facility: CLINIC | Age: 69
End: 2019-03-12

## 2019-03-12 VITALS — HEIGHT: 63 IN | TEMPERATURE: 97.7 F | BODY MASS INDEX: 35.37 KG/M2 | WEIGHT: 199.6 LBS

## 2019-03-12 DIAGNOSIS — B37.0 THRUSH: ICD-10-CM

## 2019-03-12 DIAGNOSIS — Z87.09 HISTORY OF FREQUENT URI: ICD-10-CM

## 2019-03-12 DIAGNOSIS — R49.0 HOARSENESS OF VOICE: Primary | ICD-10-CM

## 2019-03-12 PROCEDURE — 99214 OFFICE O/P EST MOD 30 MIN: CPT | Performed by: NURSE PRACTITIONER

## 2019-03-12 NOTE — PROGRESS NOTES
Subjective   Mendy Merritt is a 68 y.o. female here today for illness.    Chief Complaint   Patient presents with   • Illness     Mendy reports that she does not typically get ill that often but since November she has been ill.  She was seen on 2/7 by Dr. Becker and diagnosed with bronchitis- given biaxin and a kenalog injection.  She did not fully improve and was given more biaxin and steroid dose pack. She reports that the past few days she has a film in her mouth now and is having more nasal congestion.  She also is not able to taste anything.  She reports that mouth wash is making her mouth burn now.  She is also using baking soda mouth washes.  Her voice has never returned.  She remains hoarse.   She did get her TDAP, flu and PNA shot.  She does have a history of sarcoidosis.    Review of Systems   Constitutional: Negative for activity change, appetite change, chills, fatigue and fever.   HENT: Positive for congestion. Negative for ear discharge, ear pain, mouth sores, postnasal drip, rhinorrhea, sinus pressure, sore throat and voice change.    Eyes: Negative for photophobia, redness and itching.   Respiratory: Positive for cough. Negative for shortness of breath and wheezing.    Cardiovascular: Negative for chest pain and leg swelling.   Musculoskeletal: Negative for arthralgias and myalgias.   Skin: Negative for color change and rash.   Allergic/Immunologic: Negative for environmental allergies and immunocompromised state.   Neurological: Negative for dizziness, weakness and headache.   Hematological: Negative for adenopathy. Does not bruise/bleed easily.       Past Medical History:   Diagnosis Date   • Abnormal blood chemistry test    • Allergic rhinitis    • Anemia, B12 deficiency    • Arthritis    • Cutaneous lupus erythematosus    • Dermatitis    • Disorder of kidney and ureter    • Edema of optic nerve    • Herpes zoster    • Hypertension    • Laryngitis    • Low back pain    • Pernicious anemia    • Renal  failure    • Skin rash    • Tonsillitis    • Type 1 diabetes mellitus (CMS/HCC)    • Type 2 diabetes mellitus, controlled (CMS/HCC)    • Vitamin B deficiency      History reviewed. No pertinent family history.  History reviewed. No pertinent surgical history.  Social History     Socioeconomic History   • Marital status:      Spouse name: Not on file   • Number of children: Not on file   • Years of education: Not on file   • Highest education level: Not on file   Social Needs   • Financial resource strain: Not on file   • Food insecurity - worry: Not on file   • Food insecurity - inability: Not on file   • Transportation needs - medical: Not on file   • Transportation needs - non-medical: Not on file   Occupational History   • Not on file   Tobacco Use   • Smoking status: Never Smoker   Substance and Sexual Activity   • Alcohol use: Yes   • Drug use: Not on file   • Sexual activity: Not on file   Other Topics Concern   • Not on file   Social History Narrative   • Not on file         Current Outpatient Medications:   •  albuterol sulfate  (90 Base) MCG/ACT inhaler, 2 puffs q4-6 hr prn cough, shortness of breath or wheezing, Disp: 18 g, Rfl: 0  •  aspirin 81 MG EC tablet, Take  by mouth., Disp: , Rfl:   •  atorvastatin (LIPITOR) 80 MG tablet, Take 1 tablet by mouth Daily., Disp: 90 tablet, Rfl: 3  •  clobetasol (TEMOVATE) 0.05 % ointment, Apply  topically 2 (two) times a day., Disp: , Rfl:   •  diclofenac (VOLTAREN) 1 % gel gel, Apply 4g to area of pain or inflammation q8hr prn, Disp: 100 g, Rfl: 0  •  FREESTYLE LITE test strip, USE 3 TIMES A DAY AS DIRECTED, Disp: , Rfl: 3  •  glipiZIDE (GLUCOTROL) 10 MG 24 hr tablet, Take  by mouth., Disp: , Rfl:   •  INVOKANA 100 MG tablet, TAKE 1 TABLET BY MOUTH EVERY DAY IN THE MORNING, Disp: , Rfl: 1  •  levothyroxine (SYNTHROID, LEVOTHROID) 137 MCG tablet, Take  by mouth., Disp: , Rfl:   •  losartan (COZAAR) 50 MG tablet, , Disp: , Rfl:   •  metFORMIN XR  "(GLUCOPHATE-XR) 500 MG 24 hr tablet, Take  by mouth., Disp: , Rfl:   •  nystatin (MYCOSTATIN) [id number] UNIT/ML suspension, Swish and swallow 5 mL 4 (Four) Times a Day., Disp: 473 mL, Rfl: 0  •  TRESIBA FLEXTOUCH 100 UNIT/ML solution pen-injector, INJECT 28 UNITS EVERY EVENING TITRATE UP AS DIRECTED TO MAX OF 45 UNITS EVERY DAY, Disp: , Rfl: 5  •  triazolam (HALCION) 0.25 MG tablet, TAKE 2 TABLETS BY MOUTH 1 HOUR PRIOR TO DENTAL VISIT, Disp: , Rfl: 0  •  VICTOZA 18 MG/3ML solution pen-injector, , Disp: , Rfl:   No current facility-administered medications for this visit.     Objective   Vitals:    03/12/19 1236   Temp: 97.7 °F (36.5 °C)   Weight: 90.5 kg (199 lb 9.6 oz)   Height: 160 cm (63\")     Body mass index is 35.36 kg/m².  Physical Exam   Constitutional: She does not have a sickly appearance. No distress.   HENT:   Right Ear: Tympanic membrane and external ear normal.   Left Ear: Tympanic membrane and external ear normal.   Nose: No mucosal edema or rhinorrhea. Right sinus exhibits no maxillary sinus tenderness and no frontal sinus tenderness. Left sinus exhibits no maxillary sinus tenderness and no frontal sinus tenderness.   Mouth/Throat: Oropharynx is clear and moist and mucous membranes are normal. No oropharyngeal exudate, posterior oropharyngeal edema, posterior oropharyngeal erythema or tonsillar abscesses. No tonsillar exudate.   Eyes: Right eye exhibits no discharge. Left eye exhibits no discharge. Right conjunctiva is not injected. Left conjunctiva is not injected. No scleral icterus.   Neck: No neck rigidity. No thyromegaly present.   Cardiovascular: Normal rate and regular rhythm.   Pulmonary/Chest: Effort normal and breath sounds normal.   Lymphadenopathy:        Head (right side): No submental, no submandibular, no tonsillar, no preauricular, no posterior auricular and no occipital adenopathy present.        Head (left side): No submental, no submandibular, no tonsillar, no preauricular, no " posterior auricular and no occipital adenopathy present.     She has no cervical adenopathy.        Right cervical: No superficial cervical, no deep cervical and no posterior cervical adenopathy present.       Left cervical: No superficial cervical, no deep cervical and no posterior cervical adenopathy present.   Neurological: She is alert.   Skin: Skin is warm, dry and intact. Capillary refill takes 2 to 3 seconds. She is not diaphoretic. No pallor.   Psychiatric: She has a normal mood and affect. Her speech is normal and behavior is normal. Cognition and memory are normal.   Nursing note and vitals reviewed.      Assessment/Plan   Problem List Items Addressed This Visit     None      Visit Diagnoses     Hoarseness of voice    -  Primary    Relevant Orders    Ambulatory Referral to ENT (Otolaryngology)    History of frequent URI        Relevant Orders    Ambulatory Referral to ENT (Otolaryngology)    Thrush        Relevant Medications    nystatin (MYCOSTATIN) [id number] UNIT/ML suspension        Mendy was seen today for illness.    Diagnoses and all orders for this visit:    Hoarseness of voice  -     Ambulatory Referral to ENT (Otolaryngology)        History of frequent URI  -     Ambulatory Referral to ENT (Otolaryngology)    Thrush  -     nystatin (MYCOSTATIN) [id number] UNIT/ML suspension; Swish and swallow 5 mL 4 (Four) Times a Day.       -      No white patches on exam, however, given symptoms and history of steroid/abx use- will treat with nystatin .           The patient was counseled regarding diagnostic results, impressions, prognosis, instructions for management, risk factor reductions, education, and importance of treatment compliance.  The patient verbalized understanding of and agreement with the plan of care.    Advised patient to call with any further questions and any new or worsening symptoms.     Return if symptoms worsen or fail to improve.      BRIANNA Butt

## 2019-03-13 ENCOUNTER — TELEPHONE (OUTPATIENT)
Dept: INTERNAL MEDICINE | Facility: CLINIC | Age: 69
End: 2019-03-13

## 2019-03-13 NOTE — TELEPHONE ENCOUNTER
[facility] CALLED WANTING TO SPEAK WITH YOU ABOUT THE PATIENTS NYSTATIN BEING ON BACK ORDER. PLEASE GIVE [facility] A CALL BACK

## 2019-03-19 ENCOUNTER — OFFICE VISIT (OUTPATIENT)
Dept: INTERNAL MEDICINE | Facility: CLINIC | Age: 69
End: 2019-03-19

## 2019-03-19 VITALS — HEIGHT: 63 IN | WEIGHT: 203.2 LBS | BODY MASS INDEX: 36 KG/M2 | TEMPERATURE: 97.9 F

## 2019-03-19 DIAGNOSIS — K14.6 BURNING TONGUE SYNDROME: Primary | ICD-10-CM

## 2019-03-19 PROCEDURE — 99213 OFFICE O/P EST LOW 20 MIN: CPT | Performed by: FAMILY MEDICINE

## 2019-03-19 RX ORDER — CLOTRIMAZOLE 10 MG/1
LOZENGE ORAL; TOPICAL
Refills: 0 | COMMUNITY
Start: 2019-03-13 | End: 2019-10-22

## 2019-03-19 RX ORDER — GABAPENTIN 100 MG/1
CAPSULE ORAL
Qty: 30 CAPSULE | Refills: 0 | Status: SHIPPED | OUTPATIENT
Start: 2019-03-19 | End: 2019-04-17 | Stop reason: SDUPTHER

## 2019-03-19 NOTE — PROGRESS NOTES
"Subjective   Mendy Merritt is a 68 y.o. female.     History of Present Illness   Hre today as a work in still not feeling well. Last seen 2/15/19 for sinusitis.    RESP- Pt was seen 2/7/19 for chonchitis and was treated with biaxin, kenalog and an inhaler. Was seen again 2/15/19 with resolution of wheezing but still having sinus congestion with purulent discharge. Was treated with repeat biaxin and medrol. Pt then saw NP 3/12/19 for hoarness and was diagnosed with thrush; given nystatin and referred to ENT. Today pt reports she had a bad taste with the biaxin but no other issues wit hit. She then had a change in her metformin  and started having tongue swelling. Called the pharmacy and they changed back to the original  and the swelling resolved. Is still having loss of taste and a slimy feeling in her mouth. Feels like she \"burned her tongue\" for a week now.  Does not feel like the nystatin has helped at all.     The following portions of the patient's history were reviewed and updated as appropriate: current medications, past family history, past medical history, past social history, past surgical history and problem list.    Review of Systems   HENT: Positive for voice change.         Mouth feels slimey, cannot taste x2 weeks   Cardiovascular: Negative for chest pain.   Gastrointestinal: Negative for abdominal distention and abdominal pain.   Skin: Negative for color change.   Neurological: Negative for tremors, speech difficulty and headaches.   Psychiatric/Behavioral: Negative for agitation and confusion.   All other systems reviewed and are negative.        Current Outpatient Medications:   •  albuterol sulfate  (90 Base) MCG/ACT inhaler, 2 puffs q4-6 hr prn cough, shortness of breath or wheezing, Disp: 18 g, Rfl: 0  •  aspirin 81 MG EC tablet, Take  by mouth., Disp: , Rfl:   •  atorvastatin (LIPITOR) 80 MG tablet, Take 1 tablet by mouth Daily., Disp: 90 tablet, Rfl: 3  •  " "clobetasol (TEMOVATE) 0.05 % ointment, Apply  topically 2 (two) times a day., Disp: , Rfl:   •  clotrimazole (MYCELEX) 10 MG kendra, DISSOLVE 1 KENDRA BY MOUTH FOUR TIMES A DAY FOR 14 DAYS, Disp: , Rfl: 0  •  diclofenac (VOLTAREN) 1 % gel gel, Apply 4g to area of pain or inflammation q8hr prn, Disp: 100 g, Rfl: 0  •  FREESTYLE LITE test strip, USE 3 TIMES A DAY AS DIRECTED, Disp: , Rfl: 3  •  gabapentin (NEURONTIN) 100 MG capsule, Take 1 cap po hs x2wk and then hs prn burning tongue, Disp: 30 capsule, Rfl: 0  •  glipiZIDE (GLUCOTROL) 10 MG 24 hr tablet, Take  by mouth., Disp: , Rfl:   •  INVOKANA 100 MG tablet, TAKE 1 TABLET BY MOUTH EVERY DAY IN THE MORNING, Disp: , Rfl: 1  •  levothyroxine (SYNTHROID, LEVOTHROID) 137 MCG tablet, Take  by mouth., Disp: , Rfl:   •  losartan (COZAAR) 50 MG tablet, , Disp: , Rfl:   •  metFORMIN XR (GLUCOPHATE-XR) 500 MG 24 hr tablet, Take  by mouth., Disp: , Rfl:   •  nystatin (MYCOSTATIN) [id number] UNIT/ML suspension, Swish and swallow 5 mL 4 (Four) Times a Day., Disp: 473 mL, Rfl: 0  •  TRESIBA FLEXTOUCH 100 UNIT/ML solution pen-injector, INJECT 28 UNITS EVERY EVENING TITRATE UP AS DIRECTED TO MAX OF 45 UNITS EVERY DAY, Disp: , Rfl: 5  •  triazolam (HALCION) 0.25 MG tablet, TAKE 2 TABLETS BY MOUTH 1 HOUR PRIOR TO DENTAL VISIT, Disp: , Rfl: 0  •  VICTOZA 18 MG/3ML solution pen-injector, , Disp: , Rfl:     Objective     Temp 97.9 °F (36.6 °C)   Ht 160 cm (63\")   Wt 92.2 kg (203 lb 3.2 oz)   BMI 36.00 kg/m²     Physical Exam   Constitutional: She is oriented to person, place, and time. She appears well-developed and well-nourished.   HENT:   Right Ear: Tympanic membrane and ear canal normal.   Left Ear: Tympanic membrane and ear canal normal.   Mouth/Throat: Oropharynx is clear and moist.   Eyes: Conjunctivae and EOM are normal. Pupils are equal, round, and reactive to light.   Neck: No thyromegaly present.   Cardiovascular: Normal rate and regular rhythm.   Pulmonary/Chest: Effort " normal and breath sounds normal.   Neurological: She is alert and oriented to person, place, and time.   Skin: Skin is warm and dry.   Psychiatric: She has a normal mood and affect.   Vitals reviewed.      Assessment/Plan   Mendy was seen today for illness.    Diagnoses and all orders for this visit:    Burning tongue syndrome  -     gabapentin (NEURONTIN) 100 MG capsule; Take 1 cap po hs x2wk and then hs prn burning tongue        1. NEURO- burning tongue syndrome- discussed that this not well understood but often can relate to trigeminal neuralgia. Discussed that this could be sequelae of the swelling or of meds. Will treat with gabapentin 100 hs for 2wk and then go to as needed.  2. RECHECK- 1mo

## 2019-03-19 NOTE — PATIENT INSTRUCTIONS
1. NEURO- burning tongue syndrome- discussed that this not well understood but often can relate to trigeminal neuralgia. Discussed that this could be sequelae of the swelling or of meds. Will treat with gabapentin 100 hs for 2wk and then go to as needed.  2. RECHECK- 1mo

## 2019-03-21 ENCOUNTER — TELEPHONE (OUTPATIENT)
Dept: INTERNAL MEDICINE | Facility: CLINIC | Age: 69
End: 2019-03-21

## 2019-03-21 NOTE — TELEPHONE ENCOUNTER
Pt said that last night was the first night that she started the new med that Dr. Becker gave her and she said that she slept good for 6 hours but when she woke up she was crying for about hour and half and she is not normally like that. Pt wants you to call her back

## 2019-03-21 NOTE — TELEPHONE ENCOUNTER
I have not seen this with gabapentin before but I would recommend that she try it again tonight but at a half dose.

## 2019-03-21 NOTE — TELEPHONE ENCOUNTER
I have not spoken to the patient yet but wanted to check with you to see if this could be a side effect of of the gabapentin? Please advise.

## 2019-04-17 ENCOUNTER — OFFICE VISIT (OUTPATIENT)
Dept: INTERNAL MEDICINE | Facility: CLINIC | Age: 69
End: 2019-04-17

## 2019-04-17 VITALS — WEIGHT: 203 LBS | HEIGHT: 63 IN | BODY MASS INDEX: 35.97 KG/M2 | TEMPERATURE: 97.8 F

## 2019-04-17 DIAGNOSIS — K14.6 BURNING TONGUE SYNDROME: Primary | ICD-10-CM

## 2019-04-17 PROCEDURE — 99213 OFFICE O/P EST LOW 20 MIN: CPT | Performed by: FAMILY MEDICINE

## 2019-04-17 RX ORDER — GABAPENTIN 100 MG/1
CAPSULE ORAL
Qty: 30 CAPSULE | Refills: 5 | Status: SHIPPED | OUTPATIENT
Start: 2019-04-17 | End: 2019-10-22

## 2019-04-17 NOTE — PROGRESS NOTES
Subjective   Mendy Merritt is a 68 y.o. female.     History of Present Illness   Here for 1mo recheck “burning tongue syndrome”. Last seen 3/19/19 as a work in still not feeling well. Ws seen 2/15/19 for sinusitis. Was seen 10/3/18 for CPE and annual routine. Pt sees endo for NIDDM and hypothyroid.     RESP- Pt was seen 2/7/19 for bronchitis and was treated with biaxin, kenalog and an inhaler. Was seen again 2/15/19 with resolution of wheezing but still having sinus congestion with purulent discharge. Was treated with repeat biaxin and medrol. Pt then saw NP 3/12/19 for hoarseness and was diagnosed with thrush; given nystatin and referred to ENT. Pt did not go to ENT and returned here c/o a sensation of a burned tongue x1wk that started after she took metformin from a new  that had given her S/E of lipd swelling, loss of taste and a sense of slime on her tongue. Had changed metformin back to previous  but was still having the burning sensation. Nystatin had no impact. Was diagnosed with burning tongue syndrome and given gabapentin x2wk, then prn.  Today pt reports she took a full cap the first night and slept hard but woke up crying. Reduced to half a cap hs for 2wk and improved and then stopped it. Burning returned by next day so she restarted it at 1/2 cap and is doing well. Started to have some return of taste (sour and salt) yesterday.     The following portions of the patient's history were reviewed and updated as appropriate: current medications, past family history, past medical history, past social history, past surgical history and problem list.    Review of Systems   Cardiovascular: Negative for chest pain.   Gastrointestinal: Negative for abdominal distention and abdominal pain.   Skin: Negative for color change.   Neurological: Negative for tremors, speech difficulty and headaches.   Psychiatric/Behavioral: Negative for agitation and confusion.   All other systems reviewed and are  "negative.        Current Outpatient Medications:   •  albuterol sulfate  (90 Base) MCG/ACT inhaler, 2 puffs q4-6 hr prn cough, shortness of breath or wheezing, Disp: 18 g, Rfl: 0  •  aspirin 81 MG EC tablet, Take  by mouth., Disp: , Rfl:   •  atorvastatin (LIPITOR) 80 MG tablet, Take 1 tablet by mouth Daily., Disp: 90 tablet, Rfl: 3  •  clobetasol (TEMOVATE) 0.05 % ointment, Apply  topically 2 (two) times a day., Disp: , Rfl:   •  clotrimazole (MYCELEX) 10 MG kendra, DISSOLVE 1 KENDRA BY MOUTH FOUR TIMES A DAY FOR 14 DAYS, Disp: , Rfl: 0  •  diclofenac (VOLTAREN) 1 % gel gel, Apply 4g to area of pain or inflammation q8hr prn, Disp: 100 g, Rfl: 0  •  FREESTYLE LITE test strip, USE 3 TIMES A DAY AS DIRECTED, Disp: , Rfl: 3  •  gabapentin (NEURONTIN) 100 MG capsule, Take 1 cap po hs, Disp: 30 capsule, Rfl: 5  •  glipiZIDE (GLUCOTROL) 10 MG 24 hr tablet, Take  by mouth., Disp: , Rfl:   •  INVOKANA 100 MG tablet, TAKE 1 TABLET BY MOUTH EVERY DAY IN THE MORNING, Disp: , Rfl: 1  •  levothyroxine (SYNTHROID, LEVOTHROID) 137 MCG tablet, Take  by mouth., Disp: , Rfl:   •  losartan (COZAAR) 50 MG tablet, , Disp: , Rfl:   •  metFORMIN XR (GLUCOPHATE-XR) 500 MG 24 hr tablet, Take  by mouth., Disp: , Rfl:   •  nystatin (MYCOSTATIN) [id number] UNIT/ML suspension, Swish and swallow 5 mL 4 (Four) Times a Day., Disp: 473 mL, Rfl: 0  •  TRESIBA FLEXTOUCH 100 UNIT/ML solution pen-injector, INJECT 28 UNITS EVERY EVENING TITRATE UP AS DIRECTED TO MAX OF 45 UNITS EVERY DAY, Disp: , Rfl: 5  •  triazolam (HALCION) 0.25 MG tablet, TAKE 2 TABLETS BY MOUTH 1 HOUR PRIOR TO DENTAL VISIT, Disp: , Rfl: 0  •  VICTOZA 18 MG/3ML solution pen-injector, , Disp: , Rfl:     Objective     Temp 97.8 °F (36.6 °C)   Ht 160 cm (63\")   Wt 92.1 kg (203 lb)   BMI 35.96 kg/m²     Physical Exam   Constitutional: She is oriented to person, place, and time. She appears well-developed and well-nourished.   HENT:   Right Ear: Tympanic membrane and ear canal " normal.   Left Ear: Tympanic membrane and ear canal normal.   Mouth/Throat: Oropharynx is clear and moist.   Eyes: Conjunctivae and EOM are normal. Pupils are equal, round, and reactive to light.   Neck: No thyromegaly present.   Cardiovascular: Normal rate and regular rhythm.   Pulmonary/Chest: Effort normal and breath sounds normal.   Neurological: She is alert and oriented to person, place, and time.   Skin: Skin is warm and dry.   Psychiatric: She has a normal mood and affect.   Vitals reviewed.      Assessment/Plan   Mendy was seen today for follow-up.    Diagnoses and all orders for this visit:    Burning tongue syndrome  -     gabapentin (NEURONTIN) 100 MG capsule; Take 1 cap po hs        1. NEURO- burning tongue syndrome. Discussed that she is responding well to neurontin. Discussed that she may need to take this short or long term. Will write for RF x6mo now with contract. No concerns with this pt. Advised that she can try stopping it periodically as a trial to see when she can stay off of it but to restart it immediatly if symptoms return.  2. RECHECK- 1yr MCW with Dr Parker

## 2019-04-17 NOTE — PATIENT INSTRUCTIONS
1. NEURO- burning tongue syndrome. Discussed that she is responding well to neurontin. Discussed that she may need to take this short or long term. Will write for RF x6mo now with contract. No concerns with this pt. Advised that she can try stopping it periodically as a trial to see when she can stay off of it but to restart it immediatly if symptoms return.  2. RECHECK- 1yr CPE with Dr Parker

## 2019-04-23 ENCOUNTER — TELEPHONE (OUTPATIENT)
Dept: INTERNAL MEDICINE | Facility: CLINIC | Age: 69
End: 2019-04-23

## 2019-04-24 RX ORDER — CLOBETASOL PROPIONATE 0.5 MG/G
OINTMENT TOPICAL 2 TIMES DAILY
Qty: 45 G | Refills: 0 | Status: SHIPPED | OUTPATIENT
Start: 2019-04-24 | End: 2019-08-16 | Stop reason: SDUPTHER

## 2019-04-24 RX ORDER — METRONIDAZOLE 10 MG/G
GEL TOPICAL DAILY
Qty: 60 G | Refills: 0 | Status: SHIPPED | OUTPATIENT
Start: 2019-04-24 | End: 2019-08-16 | Stop reason: SDUPTHER

## 2019-08-16 RX ORDER — CLOBETASOL PROPIONATE 0.5 MG/G
OINTMENT TOPICAL 2 TIMES DAILY
Qty: 45 G | Refills: 0 | Status: SHIPPED | OUTPATIENT
Start: 2019-08-16 | End: 2021-12-13

## 2019-08-16 RX ORDER — METRONIDAZOLE 10 MG/G
GEL TOPICAL DAILY
Qty: 60 G | Refills: 0 | Status: SHIPPED | OUTPATIENT
Start: 2019-08-16 | End: 2020-07-21 | Stop reason: SDUPTHER

## 2019-10-09 ENCOUNTER — FLU SHOT (OUTPATIENT)
Dept: INTERNAL MEDICINE | Facility: CLINIC | Age: 69
End: 2019-10-09

## 2019-10-09 DIAGNOSIS — Z23 IMMUNIZATION, PNEUMOCOCCUS AND INFLUENZA: ICD-10-CM

## 2019-10-09 PROCEDURE — 90653 IIV ADJUVANT VACCINE IM: CPT | Performed by: INTERNAL MEDICINE

## 2019-10-09 PROCEDURE — 90471 IMMUNIZATION ADMIN: CPT | Performed by: INTERNAL MEDICINE

## 2019-10-17 DIAGNOSIS — E03.9 HYPOTHYROIDISM, UNSPECIFIED TYPE: Primary | ICD-10-CM

## 2019-10-22 ENCOUNTER — OFFICE VISIT (OUTPATIENT)
Dept: FAMILY MEDICINE CLINIC | Facility: CLINIC | Age: 69
End: 2019-10-22

## 2019-10-22 ENCOUNTER — RESULTS ENCOUNTER (OUTPATIENT)
Dept: FAMILY MEDICINE CLINIC | Facility: CLINIC | Age: 69
End: 2019-10-22

## 2019-10-22 VITALS
WEIGHT: 206 LBS | HEIGHT: 62 IN | BODY MASS INDEX: 37.91 KG/M2 | OXYGEN SATURATION: 96 % | HEART RATE: 91 BPM | DIASTOLIC BLOOD PRESSURE: 84 MMHG | TEMPERATURE: 98.5 F | SYSTOLIC BLOOD PRESSURE: 128 MMHG

## 2019-10-22 DIAGNOSIS — E08.29 DIABETES MELLITUS DUE TO UNDERLYING CONDITION WITH OTHER DIABETIC KIDNEY COMPLICATION, WITHOUT LONG-TERM CURRENT USE OF INSULIN (HCC): ICD-10-CM

## 2019-10-22 DIAGNOSIS — E78.00 PURE HYPERCHOLESTEROLEMIA: Primary | ICD-10-CM

## 2019-10-22 DIAGNOSIS — E03.8 OTHER SPECIFIED HYPOTHYROIDISM: ICD-10-CM

## 2019-10-22 DIAGNOSIS — E03.9 HYPOTHYROIDISM, UNSPECIFIED TYPE: ICD-10-CM

## 2019-10-22 DIAGNOSIS — N28.9 FUNCTION KIDNEY DECREASED: ICD-10-CM

## 2019-10-22 PROCEDURE — 99397 PER PM REEVAL EST PAT 65+ YR: CPT | Performed by: NURSE PRACTITIONER

## 2019-10-22 RX ORDER — EMPAGLIFLOZIN 10 MG/1
TABLET, FILM COATED ORAL
COMMUNITY
Start: 2019-10-10 | End: 2020-10-23

## 2019-10-22 RX ORDER — ERGOCALCIFEROL (VITAMIN D2) 10 MCG
400 TABLET ORAL DAILY
COMMUNITY

## 2019-10-22 RX ORDER — AMOXICILLIN 250 MG
CAPSULE ORAL
COMMUNITY

## 2019-10-22 NOTE — PROGRESS NOTES
Subjective   Mendy Merritt is a 69 y.o. female presents for a physical exam. She is new to this provider. Main reason for follow up is recent kidney function change on jardiance.     Recent labs with endocrinology, requested CMP, creatinine was 1.8, has been elevated previously and medications were changed and it was resolved. Med d/c was jardiance, previously taken invokana. Since started jardiance, now with rosacea. On jardiance A1C was 10, then improved to 8 (initially at taking medication). Recommended stopping    Dr. Rodolfo Dow, endocrinology, [place]    Hx sarcoidosis, face, MD in [place], labs were negative  Increased urination, cramping 3-5 x weeks, toes will stand up, cramping up and down whole leg    Synthroid on empty stomach,     Recent labs completed with endocrinology. Will have labs faxed.    Previous PCP recently moved.   Gabapentin for burning tongue syndrome s/p allergy to substitute metformin, no improvement noted and stopped after one week.    Previously prescribed byetta    Monthly breast exams  Previous cyst removed while nursing her first daughter, who is now 40, declines mammogram testing due to discomfort with initial testing.    History of Present Illness     The following portions of the patient's history were reviewed and updated as appropriate: allergies, current medications, past family history, past medical history, past social history, past surgical history and problem list.    Review of Systems   Constitutional: Negative.    HENT: Negative.    Eyes: Negative.    Respiratory: Negative.    Cardiovascular: Negative.    Gastrointestinal: Negative.    Endocrine: Negative.    Genitourinary: Negative.    Musculoskeletal: Negative.    Skin: Negative.    Allergic/Immunologic: Negative.    Neurological: Negative.    Hematological: Negative.    Psychiatric/Behavioral: Negative.        Objective   Physical Exam   Constitutional: She is oriented to person, place, and time. She appears  well-developed and well-nourished. No distress.   HENT:   Head: Normocephalic and atraumatic.   Right Ear: Tympanic membrane, external ear and ear canal normal.   Left Ear: Tympanic membrane, external ear and ear canal normal.   Nose: Nose normal.   Mouth/Throat: Uvula is midline, oropharynx is clear and moist and mucous membranes are normal. No oropharyngeal exudate.   Neck: Neck supple.   Cardiovascular: Normal rate, regular rhythm and normal heart sounds. Exam reveals no gallop and no friction rub.   No murmur heard.  Pulmonary/Chest: Effort normal and breath sounds normal. No respiratory distress. She has no wheezes. She has no rales.   Abdominal: Soft. Bowel sounds are normal. She exhibits no distension. There is no tenderness.   Neurological: She is alert and oriented to person, place, and time.   Skin: Skin is warm and dry. She is not diaphoretic.   Psychiatric: She has a normal mood and affect.   Vitals reviewed.      Assessment/Plan   Mendy was seen today for hyperlipidemia, diabetes and hypothyroidism.    Diagnoses and all orders for this visit:    Pure hypercholesterolemia    Diabetes mellitus due to underlying condition with other diabetic kidney complication, without long-term current use of insulin (CMS/[facility])    Other specified hypothyroidism    Function kidney decreased    will repeat CMP today and fax to endocrinology  Would consider d/c jardiance and resuming invokana as patient previously tolerated well and now indicated for chronic kidney disease, patient has discussed with endocrinology and is interested  Discussed potential need to change other medications including metformin and losartan, however previously tolerated and with symptoms of cramping, frequent urination on jardiance, would suspect volume depletion and dc jardiance  For continued decrease, would further evaluate other medications  Patient reports well hydrated today  Will attempt to obtain labs/records from previous PCP    Counseling  discussed regarding diet and exercise

## 2019-10-23 LAB
ALBUMIN SERPL-MCNC: 4.4 G/DL (ref 3.5–5.2)
ALBUMIN/GLOB SERPL: 1.5 G/DL
ALP SERPL-CCNC: 83 U/L (ref 39–117)
ALT SERPL-CCNC: 25 U/L (ref 1–33)
AST SERPL-CCNC: 20 U/L (ref 1–32)
BILIRUB SERPL-MCNC: 0.3 MG/DL (ref 0.2–1.2)
BUN SERPL-MCNC: 19 MG/DL (ref 8–23)
BUN/CREAT SERPL: 13.1 (ref 7–25)
CALCIUM SERPL-MCNC: 9.7 MG/DL (ref 8.6–10.5)
CHLORIDE SERPL-SCNC: 104 MMOL/L (ref 98–107)
CO2 SERPL-SCNC: 24.1 MMOL/L (ref 22–29)
CREAT SERPL-MCNC: 1.45 MG/DL (ref 0.57–1)
GLOBULIN SER CALC-MCNC: 3 GM/DL
GLUCOSE SERPL-MCNC: 194 MG/DL (ref 65–99)
POTASSIUM SERPL-SCNC: 4.7 MMOL/L (ref 3.5–5.2)
PROT SERPL-MCNC: 7.4 G/DL (ref 6–8.5)
SODIUM SERPL-SCNC: 141 MMOL/L (ref 136–145)

## 2019-11-05 ENCOUNTER — OFFICE VISIT (OUTPATIENT)
Dept: FAMILY MEDICINE CLINIC | Facility: CLINIC | Age: 69
End: 2019-11-05

## 2019-11-05 VITALS
TEMPERATURE: 97.8 F | OXYGEN SATURATION: 97 % | HEIGHT: 62 IN | RESPIRATION RATE: 16 BRPM | DIASTOLIC BLOOD PRESSURE: 80 MMHG | WEIGHT: 200 LBS | HEART RATE: 100 BPM | SYSTOLIC BLOOD PRESSURE: 130 MMHG | BODY MASS INDEX: 36.8 KG/M2

## 2019-11-05 DIAGNOSIS — R30.0 DYSURIA: ICD-10-CM

## 2019-11-05 DIAGNOSIS — J06.9 UPPER RESPIRATORY TRACT INFECTION, UNSPECIFIED TYPE: Primary | ICD-10-CM

## 2019-11-05 DIAGNOSIS — R30.0 DYSURIA: Primary | ICD-10-CM

## 2019-11-05 LAB
BILIRUB BLD-MCNC: NEGATIVE MG/DL
CLARITY, POC: CLEAR
COLOR UR: YELLOW
GLUCOSE UR STRIP-MCNC: ABNORMAL MG/DL
KETONES UR QL: NEGATIVE
LEUKOCYTE EST, POC: NEGATIVE
NITRITE UR-MCNC: POSITIVE MG/ML
PH UR: 5.5 [PH] (ref 5–8)
PROT UR STRIP-MCNC: ABNORMAL MG/DL
RBC # UR STRIP: ABNORMAL /UL
SP GR UR: 1.01 (ref 1–1.03)
UROBILINOGEN UR QL: NORMAL

## 2019-11-05 PROCEDURE — 81003 URINALYSIS AUTO W/O SCOPE: CPT | Performed by: NURSE PRACTITIONER

## 2019-11-05 PROCEDURE — 99213 OFFICE O/P EST LOW 20 MIN: CPT | Performed by: NURSE PRACTITIONER

## 2019-11-05 RX ORDER — AMOXICILLIN AND CLAVULANATE POTASSIUM 875; 125 MG/1; MG/1
1 TABLET, FILM COATED ORAL EVERY 12 HOURS SCHEDULED
Qty: 14 TABLET | Refills: 0 | Status: SHIPPED | OUTPATIENT
Start: 2019-11-05 | End: 2019-11-14

## 2019-11-05 NOTE — PROGRESS NOTES
"Subjective   Mendy Merritt is a 69 y.o. female presents with sore throat for a few weeks, started aroudn the time of her last visit 10/22. Scale 1-10, 3/4. Kept getting worst as time went on. Went to , recommend to \"ride it out\", recommended to give it time. Follow up in , strep was checked, negative. Recommended to let symptoms work their way out. Saturday throat was 9/10, unable to sleep due to pain. Followed up, treated with oral diflucan, pain from left to right and mid throat. No known fever. Ill contacts, granddaughter with bacterial infection.     Cough, productive, yellow sputum production, small amount. Frequent. Denies wheezing. Does have some shortness of breath. Felt significantly improved with oral steroid, but now returned. Increased post nasal drainage. Denies sinus pain.     Sore Throat    This is a new problem. The current episode started 1 to 4 weeks ago. The problem has been waxing and waning. There has been no fever. The pain is at a severity of 3/10 (currently 3, has been 9/10). Associated symptoms include congestion, coughing, a hoarse voice, shortness of breath and trouble swallowing. Pertinent negatives include no abdominal pain, diarrhea, drooling, ear discharge, ear pain, headaches, plugged ear sensation, neck pain, stridor, swollen glands or vomiting. She has had no exposure to strep or mono. She has tried NSAIDs, cool liquids and gargles for the symptoms. The treatment provided no relief.   Cough   This is a new problem. The current episode started in the past 7 days. The problem has been unchanged. The problem occurs every few minutes. The cough is productive of purulent sputum. Associated symptoms include nasal congestion, postnasal drip, a sore throat and shortness of breath. Pertinent negatives include no chest pain, chills, ear congestion, ear pain, fever, headaches, heartburn, hemoptysis, myalgias, rash, rhinorrhea, sweats, weight loss or wheezing. Nothing aggravates the symptoms. " She has tried oral steroids for the symptoms. The treatment provided mild relief.        The following portions of the patient's history were reviewed and updated as appropriate: allergies, current medications, past family history, past medical history, past social history, past surgical history and problem list.    Review of Systems   Constitutional: Negative for chills, fever and weight loss.   HENT: Positive for congestion, hoarse voice, postnasal drip, sore throat and trouble swallowing. Negative for drooling, ear discharge, ear pain and rhinorrhea.    Respiratory: Positive for cough and shortness of breath. Negative for hemoptysis, wheezing and stridor.    Cardiovascular: Negative for chest pain.   Gastrointestinal: Negative for abdominal pain, diarrhea, heartburn and vomiting.   Musculoskeletal: Negative for myalgias and neck pain.   Skin: Negative for rash.   Neurological: Negative for headaches.       Objective   Physical Exam   Constitutional: She appears well-developed and well-nourished.   HENT:   Head: Normocephalic and atraumatic.   Right Ear: Tympanic membrane and ear canal normal.   Left Ear: Tympanic membrane and ear canal normal.   Nose: Right sinus exhibits no maxillary sinus tenderness and no frontal sinus tenderness. Left sinus exhibits no maxillary sinus tenderness and no frontal sinus tenderness.   Mouth/Throat: Uvula is midline and mucous membranes are normal. Posterior oropharyngeal erythema present. No oropharyngeal exudate or posterior oropharyngeal edema.   Cardiovascular: Normal rate, regular rhythm and normal heart sounds. Exam reveals no friction rub.   No murmur heard.  Pulmonary/Chest: Effort normal. She has decreased breath sounds. She has no wheezes. She has no rhonchi. She has no rales.   Vitals reviewed.      Assessment/Plan   Mendy was seen today for sore throat and cough.    Diagnoses and all orders for this visit:    Upper respiratory tract infection, unspecified  type    Dysuria  -     POC Urinalysis Dipstick, Automated    Other orders  -     amoxicillin-clavulanate (AUGMENTIN) 875-125 MG per tablet; Take 1 tablet by mouth Every 12 (Twelve) Hours.    will start antibiotic  Discussed this may be viral, but with progressive changes including purulent sputum and worsening sore throat, would like to add abx  Start augmentin twice daily x 7 days  Urinary symptoms, will obtain urine culture, ua pos nitrates, neg leukocytes  For no improvement in sore throat, recommend ent follow up for further evaluation    Has started on flonase, would hold as this may worsen sore throat  Recommend daily xyzal or zyrtec  Increase fluids, gargle warm salt water  Follow up as discussed        [As Noted in HPI] : as noted in HPI [Negative] : Heme/Lymph [de-identified] : rash for 2 weeks on arm after changed soap.

## 2019-11-08 LAB
BACTERIA UR CULT: ABNORMAL
BACTERIA UR CULT: ABNORMAL
OTHER ANTIBIOTIC SUSC ISLT: ABNORMAL

## 2019-11-14 ENCOUNTER — OFFICE VISIT (OUTPATIENT)
Dept: FAMILY MEDICINE CLINIC | Facility: CLINIC | Age: 69
End: 2019-11-14

## 2019-11-14 VITALS
HEART RATE: 102 BPM | HEIGHT: 62 IN | DIASTOLIC BLOOD PRESSURE: 88 MMHG | OXYGEN SATURATION: 97 % | SYSTOLIC BLOOD PRESSURE: 124 MMHG | BODY MASS INDEX: 36.29 KG/M2 | WEIGHT: 197.2 LBS

## 2019-11-14 DIAGNOSIS — J02.9 SORE THROAT: Primary | ICD-10-CM

## 2019-11-14 DIAGNOSIS — R49.9 CHANGE IN VOICE: ICD-10-CM

## 2019-11-14 DIAGNOSIS — K21.9 GASTROESOPHAGEAL REFLUX DISEASE WITHOUT ESOPHAGITIS: ICD-10-CM

## 2019-11-14 PROCEDURE — 99213 OFFICE O/P EST LOW 20 MIN: CPT | Performed by: NURSE PRACTITIONER

## 2019-11-14 RX ORDER — PANTOPRAZOLE SODIUM 40 MG/1
40 TABLET, DELAYED RELEASE ORAL DAILY
Qty: 30 TABLET | Refills: 2 | Status: SHIPPED | OUTPATIENT
Start: 2019-11-14 | End: 2019-11-14 | Stop reason: SDUPTHER

## 2019-11-14 RX ORDER — PANTOPRAZOLE SODIUM 40 MG/1
40 TABLET, DELAYED RELEASE ORAL DAILY
Qty: 30 TABLET | Refills: 2 | Status: SHIPPED | OUTPATIENT
Start: 2019-11-14 | End: 2021-07-21

## 2019-11-15 PROBLEM — K21.9 GASTROESOPHAGEAL REFLUX DISEASE WITHOUT ESOPHAGITIS: Status: ACTIVE | Noted: 2019-11-15

## 2019-11-15 LAB — UREA BREATH TEST QL: NEGATIVE

## 2019-12-13 ENCOUNTER — OFFICE VISIT (OUTPATIENT)
Dept: FAMILY MEDICINE CLINIC | Facility: CLINIC | Age: 69
End: 2019-12-13

## 2019-12-13 VITALS
HEIGHT: 62 IN | SYSTOLIC BLOOD PRESSURE: 132 MMHG | RESPIRATION RATE: 16 BRPM | OXYGEN SATURATION: 99 % | BODY MASS INDEX: 36.99 KG/M2 | DIASTOLIC BLOOD PRESSURE: 82 MMHG | TEMPERATURE: 98 F | WEIGHT: 201 LBS | HEART RATE: 86 BPM

## 2019-12-13 DIAGNOSIS — R05.9 COUGH: Primary | ICD-10-CM

## 2019-12-13 PROCEDURE — 99213 OFFICE O/P EST LOW 20 MIN: CPT | Performed by: NURSE PRACTITIONER

## 2019-12-13 NOTE — PROGRESS NOTES
"Subjective   Mendy Merritt is a 69 y.o. female   who presents for   Chief Complaint   Patient presents with   • Cough     continues to have a cough which started ab 10 weeks ago     Two week history of cough, productive, was improving, no known fever, denies soa or wheezing, states back was hurting related to cough, sputum production is pale white, occasionally yellowish  No odor or bad taste   Has tried delsym for cough, only taking at night time, with some improvement    Sore throat has finally resolved        /82   Pulse 86   Temp 98 °F (36.7 °C)   Resp 16   Ht 156.8 cm (61.73\")   Wt 91.2 kg (201 lb)   SpO2 99%   BMI 37.09 kg/m²       History of Present Illness   Cough   This is a new problem. The current episode started more than 1 month ago. The problem has been gradually improving. The problem occurs hourly. The cough is non-productive. Associated symptoms include postnasal drip and a sore throat (finally resolved). Pertinent negatives include no chest pain, chills, ear congestion, ear pain, fever, headaches, heartburn, hemoptysis, myalgias, nasal congestion, rash, rhinorrhea, shortness of breath, sweats, weight loss or wheezing. The symptoms are aggravated by lying down.       The following portions of the patient's history were reviewed and updated as appropriate: allergies, current medications, past family history, past medical history, past social history, past surgical history and problem list.    Review of Systems  Review of Systems   Constitutional: Negative for chills, fever and weight loss.   HENT: Positive for postnasal drip and sore throat (finally resolved). Negative for ear pain and rhinorrhea.    Respiratory: Positive for cough. Negative for hemoptysis, shortness of breath and wheezing.    Cardiovascular: Negative for chest pain.   Gastrointestinal: Negative for heartburn.   Musculoskeletal: Negative for myalgias.   Skin: Negative for rash.   Neurological: Negative for headaches. "       Objective   Physical Exam  Physical Exam   Constitutional: She is oriented to person, place, and time. She appears well-developed and well-nourished. No distress.   HENT:   Mouth/Throat: Oropharynx is clear and moist. No oropharyngeal exudate.   Eyes: Pupils are equal, round, and reactive to light.   Neck: Neck supple.   Cardiovascular: Normal rate, regular rhythm and normal heart sounds. Exam reveals no gallop and no friction rub.   No murmur heard.  Pulmonary/Chest: Effort normal and breath sounds normal. No stridor. No respiratory distress. She has no wheezes. She has no rales.   Neurological: She is alert and oriented to person, place, and time.   Skin: Skin is warm and dry. She is not diaphoretic.   Psychiatric: She has a normal mood and affect.   Vitals reviewed.        Assessment/Plan   Mendy was seen today for cough.    Diagnoses and all orders for this visit:    Cough  -     XR Chest PA & Lateral; Future    will obtain chest xray in future for continued symptoms  Discussed cough can linger after recent illness for several weeks to months  Follow up for change in symptoms  Recommend daily antihistamine to decrease post nasal drainage  Discussed air filter in her home, she is staying at her daughters, which is a new build  She is also having a home built and is there occasionally  Follow up with ENT as discussed for continued symptoms

## 2020-01-27 ENCOUNTER — TELEPHONE (OUTPATIENT)
Dept: FAMILY MEDICINE CLINIC | Facility: CLINIC | Age: 70
End: 2020-01-27

## 2020-01-27 NOTE — TELEPHONE ENCOUNTER
Pt is requesting a referral to have her hearing checked for hearing loss.    Pls advise.    Pt can be reached #[phone].

## 2020-01-28 DIAGNOSIS — H91.93 DECREASED HEARING OF BOTH EARS: Primary | ICD-10-CM

## 2020-07-21 RX ORDER — METRONIDAZOLE 10 MG/G
GEL TOPICAL DAILY
Qty: 60 G | Refills: 0 | Status: SHIPPED | OUTPATIENT
Start: 2020-07-21 | End: 2020-11-19

## 2020-07-21 NOTE — TELEPHONE ENCOUNTER
PATIENT REQUESTED A REFILL ON:metroNIDAZOLE (METROGEL) 1 % gel    PATIENT CAN BE REACHED ON:[phone]    PHARMACY PREFERRED:[id number] - [place], [place] - [address] AT [facility] [phone] [facility] [phone]

## 2020-07-22 ENCOUNTER — TELEPHONE (OUTPATIENT)
Dept: FAMILY MEDICINE CLINIC | Facility: CLINIC | Age: 70
End: 2020-07-22

## 2020-07-22 NOTE — TELEPHONE ENCOUNTER
PT CALLED AND STATED SHE WAS FEELING DIZZY OR LIGHTHEADED THIS MORNING.  SHE RECENTLY RE-STARTED TAKING INVOKANA BECAUSE THE JARDIANCE WASN'T COVERED BY INS.  SHE STATED SHE DOESN'T FEEL LIKE IT'S COVID RELATED, BUT WOULD LIKE TO KNOW WHAT TO DO.    CALLBACK:  [phone]     PLEASE ADVISE.

## 2020-07-22 NOTE — TELEPHONE ENCOUNTER
Is she able to check her blood pressure.  This medication should not cause dizziness unless she isn't getting enough fluids.  Any other symptoms related? Low blood sugar?

## 2020-07-22 NOTE — TELEPHONE ENCOUNTER
Spoke with patient and she states that currently is not feeling light headed or dizzy at the moment. She states that for the past couple of days her blood sugar has been low and today her blood sugar was high in the 160-170 range. I advised her to monitor her BP and her blood sugar for a week and report to us via chat.

## 2020-09-25 ENCOUNTER — FLU SHOT (OUTPATIENT)
Dept: FAMILY MEDICINE CLINIC | Facility: CLINIC | Age: 70
End: 2020-09-25

## 2020-09-25 ENCOUNTER — TELEPHONE (OUTPATIENT)
Dept: FAMILY MEDICINE CLINIC | Facility: CLINIC | Age: 70
End: 2020-09-25

## 2020-09-25 DIAGNOSIS — Z23 NEED FOR INFLUENZA VACCINATION: ICD-10-CM

## 2020-09-25 DIAGNOSIS — E03.9 HYPOTHYROIDISM, UNSPECIFIED TYPE: Primary | ICD-10-CM

## 2020-09-25 DIAGNOSIS — E78.00 PURE HYPERCHOLESTEROLEMIA: ICD-10-CM

## 2020-09-25 DIAGNOSIS — E08.29 DIABETES MELLITUS DUE TO UNDERLYING CONDITION WITH OTHER DIABETIC KIDNEY COMPLICATION, WITHOUT LONG-TERM CURRENT USE OF INSULIN (HCC): ICD-10-CM

## 2020-09-25 PROCEDURE — 90694 VACC AIIV4 NO PRSRV 0.5ML IM: CPT | Performed by: NURSE PRACTITIONER

## 2020-09-25 PROCEDURE — 90471 IMMUNIZATION ADMIN: CPT | Performed by: NURSE PRACTITIONER

## 2020-09-25 NOTE — TELEPHONE ENCOUNTER
Patient came in for a flu shot today and she informed me that she has a physical coming up on 10/23/2020.  She is going to go to the downstairs lab and have them done prior to the appt.  Can you please place the lab orders for the lab downstairs? Thank you!

## 2020-09-28 ENCOUNTER — RESULTS ENCOUNTER (OUTPATIENT)
Dept: FAMILY MEDICINE CLINIC | Facility: CLINIC | Age: 70
End: 2020-09-28

## 2020-09-28 DIAGNOSIS — E03.9 HYPOTHYROIDISM, UNSPECIFIED TYPE: ICD-10-CM

## 2020-09-28 DIAGNOSIS — E78.00 PURE HYPERCHOLESTEROLEMIA: ICD-10-CM

## 2020-09-28 DIAGNOSIS — E08.29 DIABETES MELLITUS DUE TO UNDERLYING CONDITION WITH OTHER DIABETIC KIDNEY COMPLICATION, WITHOUT LONG-TERM CURRENT USE OF INSULIN (HCC): ICD-10-CM

## 2020-10-23 ENCOUNTER — OFFICE VISIT (OUTPATIENT)
Dept: FAMILY MEDICINE CLINIC | Facility: CLINIC | Age: 70
End: 2020-10-23

## 2020-10-23 ENCOUNTER — LAB (OUTPATIENT)
Dept: LAB | Facility: HOSPITAL | Age: 70
End: 2020-10-23

## 2020-10-23 VITALS
SYSTOLIC BLOOD PRESSURE: 132 MMHG | RESPIRATION RATE: 16 BRPM | TEMPERATURE: 97.1 F | BODY MASS INDEX: 36.49 KG/M2 | OXYGEN SATURATION: 99 % | DIASTOLIC BLOOD PRESSURE: 88 MMHG | HEIGHT: 62 IN | WEIGHT: 198.3 LBS | HEART RATE: 109 BPM

## 2020-10-23 DIAGNOSIS — E03.8 OTHER SPECIFIED HYPOTHYROIDISM: ICD-10-CM

## 2020-10-23 DIAGNOSIS — Z79.4 DIABETES MELLITUS TYPE 2, INSULIN DEPENDENT (HCC): ICD-10-CM

## 2020-10-23 DIAGNOSIS — Z79.4 DIABETES MELLITUS TYPE 2, INSULIN DEPENDENT (HCC): Primary | ICD-10-CM

## 2020-10-23 DIAGNOSIS — E11.9 DIABETES MELLITUS TYPE 2, INSULIN DEPENDENT (HCC): Primary | ICD-10-CM

## 2020-10-23 DIAGNOSIS — N18.9 CHRONIC KIDNEY DISEASE, UNSPECIFIED CKD STAGE: ICD-10-CM

## 2020-10-23 DIAGNOSIS — E78.00 PURE HYPERCHOLESTEROLEMIA: ICD-10-CM

## 2020-10-23 DIAGNOSIS — E11.9 DIABETES MELLITUS TYPE 2, INSULIN DEPENDENT (HCC): ICD-10-CM

## 2020-10-23 DIAGNOSIS — Z00.00 ANNUAL PHYSICAL EXAM: Primary | ICD-10-CM

## 2020-10-23 LAB
ALBUMIN SERPL-MCNC: 4.2 G/DL (ref 3.5–5.2)
ALBUMIN/GLOB SERPL: 1.2 G/DL
ALP SERPL-CCNC: 92 U/L (ref 39–117)
ALT SERPL W P-5'-P-CCNC: 9 U/L (ref 1–33)
ANION GAP SERPL CALCULATED.3IONS-SCNC: 12.7 MMOL/L (ref 5–15)
AST SERPL-CCNC: 12 U/L (ref 1–32)
BASOPHILS # BLD AUTO: 0.01 10*3/MM3 (ref 0–0.2)
BASOPHILS NFR BLD AUTO: 0.1 % (ref 0–1.5)
BILIRUB SERPL-MCNC: 0.4 MG/DL (ref 0–1.2)
BUN SERPL-MCNC: 24 MG/DL (ref 8–23)
BUN/CREAT SERPL: 12.6 (ref 7–25)
CALCIUM SPEC-SCNC: 10 MG/DL (ref 8.6–10.5)
CHLORIDE SERPL-SCNC: 105 MMOL/L (ref 98–107)
CHOLEST SERPL-MCNC: 148 MG/DL (ref 0–200)
CO2 SERPL-SCNC: 22.3 MMOL/L (ref 22–29)
CREAT SERPL-MCNC: 1.9 MG/DL (ref 0.57–1)
DEPRECATED RDW RBC AUTO: 42.7 FL (ref 37–54)
EOSINOPHIL # BLD AUTO: 0.23 10*3/MM3 (ref 0–0.4)
EOSINOPHIL NFR BLD AUTO: 2.5 % (ref 0.3–6.2)
ERYTHROCYTE [DISTWIDTH] IN BLOOD BY AUTOMATED COUNT: 13.3 % (ref 12.3–15.4)
GFR SERPL CREATININE-BSD FRML MDRD: 26 ML/MIN/1.73
GLOBULIN UR ELPH-MCNC: 3.6 GM/DL
GLUCOSE SERPL-MCNC: 82 MG/DL (ref 65–99)
HBA1C MFR BLD: 10.38 % (ref 4.8–5.6)
HCT VFR BLD AUTO: 40.4 % (ref 34–46.6)
HDLC SERPL-MCNC: 44 MG/DL (ref 40–60)
HGB BLD-MCNC: 13.1 G/DL (ref 12–15.9)
IMM GRANULOCYTES # BLD AUTO: 0.05 10*3/MM3 (ref 0–0.05)
IMM GRANULOCYTES NFR BLD AUTO: 0.5 % (ref 0–0.5)
LDLC SERPL CALC-MCNC: 76 MG/DL (ref 0–100)
LDLC/HDLC SERPL: 1.61 {RATIO}
LYMPHOCYTES # BLD AUTO: 2.35 10*3/MM3 (ref 0.7–3.1)
LYMPHOCYTES NFR BLD AUTO: 25.7 % (ref 19.6–45.3)
MCH RBC QN AUTO: 28.4 PG (ref 26.6–33)
MCHC RBC AUTO-ENTMCNC: 32.4 G/DL (ref 31.5–35.7)
MCV RBC AUTO: 87.6 FL (ref 79–97)
MONOCYTES # BLD AUTO: 0.71 10*3/MM3 (ref 0.1–0.9)
MONOCYTES NFR BLD AUTO: 7.8 % (ref 5–12)
NEUTROPHILS NFR BLD AUTO: 5.79 10*3/MM3 (ref 1.7–7)
NEUTROPHILS NFR BLD AUTO: 63.4 % (ref 42.7–76)
NRBC BLD AUTO-RTO: 0 /100 WBC (ref 0–0.2)
PLATELET # BLD AUTO: 240 10*3/MM3 (ref 140–450)
PMV BLD AUTO: 9.3 FL (ref 6–12)
POTASSIUM SERPL-SCNC: 4 MMOL/L (ref 3.5–5.2)
PROT SERPL-MCNC: 7.8 G/DL (ref 6–8.5)
RBC # BLD AUTO: 4.61 10*6/MM3 (ref 3.77–5.28)
SODIUM SERPL-SCNC: 140 MMOL/L (ref 136–145)
T4 FREE SERPL-MCNC: 1.98 NG/DL (ref 0.93–1.7)
TRIGL SERPL-MCNC: 166 MG/DL (ref 0–150)
TSH SERPL DL<=0.05 MIU/L-ACNC: 0.12 UIU/ML (ref 0.27–4.2)
VLDLC SERPL-MCNC: 28 MG/DL (ref 5–40)
WBC # BLD AUTO: 9.14 10*3/MM3 (ref 3.4–10.8)

## 2020-10-23 PROCEDURE — 84443 ASSAY THYROID STIM HORMONE: CPT | Performed by: NURSE PRACTITIONER

## 2020-10-23 PROCEDURE — 83036 HEMOGLOBIN GLYCOSYLATED A1C: CPT | Performed by: NURSE PRACTITIONER

## 2020-10-23 PROCEDURE — 99213 OFFICE O/P EST LOW 20 MIN: CPT | Performed by: NURSE PRACTITIONER

## 2020-10-23 PROCEDURE — 36415 COLL VENOUS BLD VENIPUNCTURE: CPT | Performed by: NURSE PRACTITIONER

## 2020-10-23 PROCEDURE — 80061 LIPID PANEL: CPT | Performed by: NURSE PRACTITIONER

## 2020-10-23 PROCEDURE — 85025 COMPLETE CBC W/AUTO DIFF WBC: CPT | Performed by: NURSE PRACTITIONER

## 2020-10-23 PROCEDURE — 80053 COMPREHEN METABOLIC PANEL: CPT | Performed by: NURSE PRACTITIONER

## 2020-10-23 PROCEDURE — 84439 ASSAY OF FREE THYROXINE: CPT | Performed by: NURSE PRACTITIONER

## 2020-10-23 PROCEDURE — 99397 PER PM REEVAL EST PAT 65+ YR: CPT | Performed by: NURSE PRACTITIONER

## 2020-10-23 RX ORDER — LEVOCETIRIZINE DIHYDROCHLORIDE 5 MG/1
5 TABLET, FILM COATED ORAL AS NEEDED
COMMUNITY
End: 2022-05-07

## 2020-10-23 RX ORDER — AMLODIPINE BESYLATE 5 MG/1
5 TABLET ORAL DAILY
Qty: 30 TABLET | Refills: 1 | Status: SHIPPED | OUTPATIENT
Start: 2020-10-23 | End: 2020-11-18

## 2020-10-23 NOTE — PROGRESS NOTES
"Subjective   Mendy Merritt is a 70 y.o. female   who presents for   Chief Complaint   Patient presents with   • Annual Exam     Endocrinologist, A1C 11.0, two months ago  Currently taking invokana 100 mg daily, stopped invokana secondary to creatinine  Having low glucose levels in am, 60-70, vision is changed with low glucose, will take a few sips of coke and clears up  Still taking metformin,     Rosacea, applies metrogel at night  During the day, prosacea  /88   Pulse 109   Temp 97.1 °F (36.2 °C)   Resp 16   Ht 156.8 cm (61.73\")   Wt 89.9 kg (198 lb 4.8 oz)   SpO2 99%   BMI 36.59 kg/m²       History of Present Illness   Hypertension  This is a chronic problem. The current episode started more than 1 year ago. The problem is unchanged. The problem is controlled. Pertinent negatives include no anxiety, blurred vision, chest pain, headaches, malaise/fatigue, neck pain, orthopnea, palpitations, peripheral edema, PND, shortness of breath or sweats. There are no associated agents to hypertension. Risk factors for coronary artery disease include diabetes mellitus and post-menopausal state. Past treatments include angiotensin blockers. Current antihypertension treatment includes alpha 1 blockers. The current treatment provides moderate improvement. Compliance problems include diet and exercise.  Hypertensive end-organ damage includes kidney disease. Identifiable causes of hypertension include chronic renal disease.       The following portions of the patient's history were reviewed and updated as appropriate: allergies, current medications, past family history, past medical history, past social history, past surgical history and problem list.    Review of Systems  Review of Systems   Constitutional: Negative.  Negative for malaise/fatigue.   HENT: Negative.    Eyes: Negative.  Negative for blurred vision.   Respiratory: Negative.  Negative for shortness of breath.    Cardiovascular: Negative.  Negative for chest " pain, palpitations, orthopnea and PND.   Gastrointestinal: Negative.    Endocrine: Negative.    Genitourinary: Negative.    Musculoskeletal: Negative.  Negative for neck pain.   Skin: Positive for color change (hx of rosacea, taking medication, cheeks mildly erythematous).   Allergic/Immunologic: Negative.    Neurological: Negative.  Negative for headaches.   Hematological: Negative.    Psychiatric/Behavioral: Negative.        Objective   Physical Exam  Physical Exam  Vitals signs reviewed.   Constitutional:       General: She is not in acute distress.     Appearance: Normal appearance. She is well-developed. She is not ill-appearing, toxic-appearing or diaphoretic.   HENT:      Head: Normocephalic and atraumatic.      Right Ear: Tympanic membrane, ear canal and external ear normal.      Left Ear: Tympanic membrane, ear canal and external ear normal.      Nose: Nose normal.      Mouth/Throat:      Pharynx: Uvula midline. No oropharyngeal exudate.   Neck:      Musculoskeletal: Neck supple.   Cardiovascular:      Rate and Rhythm: Normal rate and regular rhythm.      Heart sounds: Normal heart sounds. No murmur. No friction rub. No gallop.    Pulmonary:      Effort: Pulmonary effort is normal. No respiratory distress.      Breath sounds: Normal breath sounds. No wheezing or rales.   Abdominal:      General: Bowel sounds are normal. There is no distension.      Palpations: Abdomen is soft.      Tenderness: There is no abdominal tenderness.   Skin:     General: Skin is warm and dry.      Comments: Erythema to bilateral cheeks, no pustules   Neurological:      Mental Status: She is alert and oriented to person, place, and time.           Assessment/Plan   Diagnoses and all orders for this visit:    1. Annual physical exam (Primary)    2. Diabetes mellitus type 2, insulin dependent (CMS/[facility])    3. Pure hypercholesterolemia    4. Other specified hypothyroidism    5. Chronic kidney disease, unspecified CKD stage  -     Basic  metabolic panel; Future    Other orders  -     amLODIPine (NORVASC) 5 MG tablet; Take 1 tablet by mouth Daily.  Dispense: 30 tablet; Refill: 1    labs completed this am  Received cbc/cmp  Kidney function is decreased, creatinine 1.9  D/c losartan/olmesartan (has received both in past)  Start amlodipine, to follow up in 2 weeks with repeat bp and bmp to monitor creatinine   Recommend contacting endocrinology regarding creatinine, would consider d/c metformin as well  Will await their recommendations  Pt to follow up with lab draw for repeat bp as well      Information/counseling provided to the patient regarding periodic terri maintenance recommendations, including but not limited to immunizations, diet/exercise/healthy lifestyle, laboratory, and other screenings. BMI is discussed. Appropriate exercise, diet, and weight plans are discussed.

## 2020-10-26 ENCOUNTER — TELEPHONE (OUTPATIENT)
Dept: ENDOCRINOLOGY | Facility: CLINIC | Age: 70
End: 2020-10-26

## 2020-10-26 NOTE — TELEPHONE ENCOUNTER
Labs are in -labs tab.  She is worried about her creat -she stopped losartan.  her tsh was abn as well.  Please advise

## 2020-10-26 NOTE — TELEPHONE ENCOUNTER
PT CALLED AND HAD LABS DONE ON 10/23/20 BY VALENTINA REED  SHE WAS WORRIED ABOUT HER CREATINE WAS 1.9 PT STOPPED TAKING LOSARTAN. HER THYROID WAS ELEVATED AS WELL SHE IS TAKING. PLEASE ADVISE PT

## 2020-10-26 NOTE — TELEPHONE ENCOUNTER
PT just got some blood work done and her creatine levels and blood pressure levels are high. She wants to know if she should go off of Invokana for a couple of weeks to decrease the creatine and blood sugar levels? She also wanted to know if she should have any lab work done before appt on 11/09

## 2020-10-26 NOTE — TELEPHONE ENCOUNTER
-1- she no showed her appointment with me today.  -2- she needs to stop invokana as well and then make an appointment for 3-4 weeks.  -3- we can address the thyroid then

## 2020-10-27 DIAGNOSIS — E03.8 OTHER SPECIFIED HYPOTHYROIDISM: Primary | ICD-10-CM

## 2020-10-27 DIAGNOSIS — Z79.4 DIABETES MELLITUS TYPE 2, INSULIN DEPENDENT (HCC): ICD-10-CM

## 2020-10-27 DIAGNOSIS — E11.9 DIABETES MELLITUS TYPE 2, INSULIN DEPENDENT (HCC): ICD-10-CM

## 2020-10-28 ENCOUNTER — RESULTS ENCOUNTER (OUTPATIENT)
Dept: FAMILY MEDICINE CLINIC | Facility: CLINIC | Age: 70
End: 2020-10-28

## 2020-10-28 DIAGNOSIS — N18.9 CHRONIC KIDNEY DISEASE, UNSPECIFIED CKD STAGE: ICD-10-CM

## 2020-11-09 ENCOUNTER — OFFICE VISIT (OUTPATIENT)
Dept: ENDOCRINOLOGY | Facility: CLINIC | Age: 70
End: 2020-11-09

## 2020-11-09 ENCOUNTER — LAB (OUTPATIENT)
Dept: LAB | Facility: HOSPITAL | Age: 70
End: 2020-11-09

## 2020-11-09 VITALS
HEIGHT: 61 IN | DIASTOLIC BLOOD PRESSURE: 88 MMHG | HEART RATE: 103 BPM | SYSTOLIC BLOOD PRESSURE: 136 MMHG | TEMPERATURE: 97.3 F | BODY MASS INDEX: 38.67 KG/M2 | OXYGEN SATURATION: 97 % | WEIGHT: 204.8 LBS

## 2020-11-09 DIAGNOSIS — N28.9 RENAL INSUFFICIENCY, MILD: ICD-10-CM

## 2020-11-09 DIAGNOSIS — E11.29 TYPE 2 DIABETES MELLITUS WITH OTHER DIABETIC KIDNEY COMPLICATION, WITH LONG-TERM CURRENT USE OF INSULIN (HCC): ICD-10-CM

## 2020-11-09 DIAGNOSIS — Z79.4 TYPE 2 DIABETES MELLITUS WITH OTHER DIABETIC KIDNEY COMPLICATION, WITH LONG-TERM CURRENT USE OF INSULIN (HCC): ICD-10-CM

## 2020-11-09 DIAGNOSIS — N28.9 RENAL INSUFFICIENCY, MILD: Primary | ICD-10-CM

## 2020-11-09 LAB
ALBUMIN SERPL-MCNC: 4.4 G/DL (ref 3.5–5.2)
ALBUMIN/GLOB SERPL: 1.4 G/DL
ALP SERPL-CCNC: 90 U/L (ref 39–117)
ALT SERPL W P-5'-P-CCNC: 10 U/L (ref 1–33)
ANION GAP SERPL CALCULATED.3IONS-SCNC: 6.4 MMOL/L (ref 5–15)
AST SERPL-CCNC: 14 U/L (ref 1–32)
BILIRUB SERPL-MCNC: 0.4 MG/DL (ref 0–1.2)
BUN SERPL-MCNC: 14 MG/DL (ref 8–23)
BUN/CREAT SERPL: 9.7 (ref 7–25)
CALCIUM SPEC-SCNC: 9.5 MG/DL (ref 8.6–10.5)
CHLORIDE SERPL-SCNC: 103 MMOL/L (ref 98–107)
CO2 SERPL-SCNC: 23.6 MMOL/L (ref 22–29)
CREAT SERPL-MCNC: 1.45 MG/DL (ref 0.57–1)
GFR SERPL CREATININE-BSD FRML MDRD: 36 ML/MIN/1.73
GLOBULIN UR ELPH-MCNC: 3.1 GM/DL
GLUCOSE SERPL-MCNC: 322 MG/DL (ref 65–99)
POTASSIUM SERPL-SCNC: 4.9 MMOL/L (ref 3.5–5.2)
PROT SERPL-MCNC: 7.5 G/DL (ref 6–8.5)
SODIUM SERPL-SCNC: 133 MMOL/L (ref 136–145)

## 2020-11-09 PROCEDURE — 99213 OFFICE O/P EST LOW 20 MIN: CPT | Performed by: INTERNAL MEDICINE

## 2020-11-09 PROCEDURE — 80053 COMPREHEN METABOLIC PANEL: CPT

## 2020-11-09 NOTE — PROGRESS NOTES
"     Office Note      Date: 2020  Patient Name: Mendy Merritt  MRN: [id number]  : 1950    Chief Complaint   Patient presents with   • Follow-up   • Diabetes       History of Present Illness:   Mendy Merritt is a 70 y.o. female who presents for Diabetes type 2. Diagnosed in: . Treated in past with oral agents. Current treatments: victoza, tresiba and metformin . We stopped invokana after her creatinine went form 1.4 to 1.9. . Number of insulin shots per day: 1. Checks blood sugar 2 times per day . Has low blood sugar: rare.  Twice a week  She has been having hypoglycemia. She reports that she is not eating as much and she has seen lower numbers lately.   She  Has rather severe rosacea. And burning mouth syndrome that has not been responding to current treatment.     Last A1c:  Hemoglobin A1C   Date Value Ref Range Status   10/23/2020 10.38 (H) 4.80 - 5.60 % Final       Changes in health since last visit: see above . Last eye exam up to date .    Subjective      Diabetic Complications:  Eyes: No  Kidneys: Yes, describe: see above   Feet: No  Heart: No    Diet and Exercise:  Meals per day: 2  Minutes of exercise per week: moving has been stressful  mins.    Review of Systems:   Review of Systems   Constitutional: Negative.    HENT: Positive for rhinorrhea.    Respiratory: Negative.    Endocrine: Positive for polydipsia and polyuria.   Genitourinary: Negative.    Skin: Positive for rash.       The following portions of the patient's history were reviewed and updated as appropriate: allergies, current medications, past family history, past medical history, past social history, past surgical history and problem list.    Objective     Visit Vitals  /88   Pulse 103   Temp 97.3 °F (36.3 °C) (Infrared)   Ht 155.6 cm (61.25\")   Wt 92.9 kg (204 lb 12.8 oz)   SpO2 97%   BMI 38.38 kg/m²       Labs:    CMP  Lab Results   Component Value Date    GLUCOSE 82 10/23/2020    BUN 24 (H) 10/23/2020    CREATININE 1.90 (H) " 10/23/2020    EGFRIFNONA 26 (L) 10/23/2020    EGFRIFAFRI 43 (L) 10/22/2019    BCR 12.6 10/23/2020    K 4.0 10/23/2020    CO2 22.3 10/23/2020    CALCIUM 10.0 10/23/2020    PROTENTOTREF 7.4 10/22/2019    LABIL2 1.5 10/22/2019    AST 12 10/23/2020    ALT 9 10/23/2020        CBC w/DIFF  Lab Results   Component Value Date    WBC 9.14 10/23/2020    RBC 4.61 10/23/2020    HGB 13.1 10/23/2020    HCT 40.4 10/23/2020    MCV 87.6 10/23/2020    MCH 28.4 10/23/2020    MCHC 32.4 10/23/2020    RDW 13.3 10/23/2020    RDWSD 42.7 10/23/2020    MPV 9.3 10/23/2020     10/23/2020    NEUTRORELPCT 63.4 10/23/2020    LYMPHORELPCT 25.7 10/23/2020    MONORELPCT 7.8 10/23/2020    EOSRELPCT 2.5 10/23/2020    BASORELPCT 0.1 10/23/2020    AUTOIGPER 0.5 10/23/2020    NEUTROABS 5.79 10/23/2020    LYMPHSABS 2.35 10/23/2020    MONOSABS 0.71 10/23/2020    EOSABS 0.23 10/23/2020    BASOSABS 0.01 10/23/2020    AUTOIGNUM 0.05 10/23/2020    NRBC 0.0 10/23/2020       Physical Exam:  Physical Exam  Constitutional:       Appearance: Normal appearance. She is obese.   Neurological:      General: No focal deficit present.      Mental Status: She is alert. Mental status is at baseline.   Psychiatric:         Mood and Affect: Mood normal.         Thought Content: Thought content normal.         Judgment: Judgment normal.          Assessment / Plan      Assessment & Plan:  Problem List Items Addressed This Visit        Endocrine    Diabetes (CMS/[facility])    Current Assessment & Plan     a1c is worse but her home readings are better lately. And she has hypoglycemia so overall it is better.  The plan is to try to decide what to do about metofrmin and see if we can get her a hien          Relevant Medications    metFORMIN XR (GLUCOPHATE-XR) 500 MG 24 hr tablet    TRESIBA FLEXTOUCH 100 UNIT/ML solution pen-injector    VICTOZA 18 MG/3ML solution pen-injector    Continuous Blood Gluc Sensor (FreeStyle Hien 2 Sensor Systm) misc    Continuous Blood Gluc   (FreeStyle Hien 2 [place] Systm) device       Genitourinary    Renal insufficiency, mild - Primary    Current Assessment & Plan     I need to see what her creatinine is  To decide what to do about the metformin          Relevant Orders    Comprehensive Metabolic Panel           Rodolfo Dow MD   11/09/2020

## 2020-11-09 NOTE — ASSESSMENT & PLAN NOTE
a1c is worse but her home readings are better lately. And she has hypoglycemia so overall it is better.  The plan is to try to decide what to do about metofrmin and see if we can get her a sebas

## 2020-11-18 RX ORDER — AMLODIPINE BESYLATE 5 MG/1
TABLET ORAL
Qty: 30 TABLET | Refills: 1 | Status: SHIPPED | OUTPATIENT
Start: 2020-11-18 | End: 2020-12-11

## 2020-11-19 RX ORDER — METRONIDAZOLE 10 MG/G
GEL TOPICAL
Qty: 60 G | Refills: 3 | Status: SHIPPED | OUTPATIENT
Start: 2020-11-19 | End: 2021-07-21

## 2020-12-11 RX ORDER — AMLODIPINE BESYLATE 5 MG/1
TABLET ORAL
Qty: 90 TABLET | Refills: 1 | Status: SHIPPED | OUTPATIENT
Start: 2020-12-11 | End: 2021-04-20

## 2021-01-14 RX ORDER — PEN NEEDLE, DIABETIC 32GX 5/32"
1 NEEDLE, DISPOSABLE MISCELLANEOUS 2 TIMES DAILY
Qty: 200 EACH | Refills: 1 | Status: SHIPPED | OUTPATIENT
Start: 2021-01-14

## 2021-01-14 RX ORDER — LIRAGLUTIDE 6 MG/ML
1.8 INJECTION SUBCUTANEOUS DAILY
Qty: 27 ML | Refills: 1 | Status: SHIPPED | OUTPATIENT
Start: 2021-01-14 | End: 2021-07-21 | Stop reason: SDUPTHER

## 2021-01-14 RX ORDER — INSULIN DEGLUDEC INJECTION 100 U/ML
INJECTION, SOLUTION SUBCUTANEOUS
Qty: 36 ML | Refills: 1 | Status: SHIPPED | OUTPATIENT
Start: 2021-01-14 | End: 2021-02-18 | Stop reason: SDUPTHER

## 2021-01-14 NOTE — TELEPHONE ENCOUNTER
Patient called and needs a refill of her Victoza, Triseba, and her needles [facility] [place]. Please advise.

## 2021-02-02 ENCOUNTER — TELEPHONE (OUTPATIENT)
Dept: FAMILY MEDICINE CLINIC | Facility: CLINIC | Age: 71
End: 2021-02-02

## 2021-02-02 NOTE — TELEPHONE ENCOUNTER
PATIENT IS REQUESTING REFERRAL TO AN ENDOCRINOLOGIST IN THE AREA.  SHE HAS ONE IN [place] BUT WANTS TO HAVE ONE LOCALLY.  PLEASE ADVISE    PATIENT CALL BACK: [phone]

## 2021-02-03 DIAGNOSIS — E11.9 DIABETES MELLITUS TYPE 2, INSULIN DEPENDENT (HCC): Primary | ICD-10-CM

## 2021-02-03 DIAGNOSIS — Z79.4 DIABETES MELLITUS TYPE 2, INSULIN DEPENDENT (HCC): Primary | ICD-10-CM

## 2021-02-05 ENCOUNTER — OFFICE VISIT (OUTPATIENT)
Dept: FAMILY MEDICINE CLINIC | Facility: CLINIC | Age: 71
End: 2021-02-05

## 2021-02-05 VITALS
SYSTOLIC BLOOD PRESSURE: 150 MMHG | TEMPERATURE: 96.2 F | DIASTOLIC BLOOD PRESSURE: 100 MMHG | OXYGEN SATURATION: 97 % | WEIGHT: 199 LBS | HEIGHT: 61 IN | RESPIRATION RATE: 16 BRPM | HEART RATE: 110 BPM | BODY MASS INDEX: 37.57 KG/M2

## 2021-02-05 DIAGNOSIS — E08.29 DIABETES MELLITUS DUE TO UNDERLYING CONDITION WITH OTHER DIABETIC KIDNEY COMPLICATION, WITHOUT LONG-TERM CURRENT USE OF INSULIN (HCC): Primary | ICD-10-CM

## 2021-02-05 DIAGNOSIS — N18.9 CHRONIC KIDNEY DISEASE, UNSPECIFIED CKD STAGE: ICD-10-CM

## 2021-02-05 DIAGNOSIS — Z79.4 DIABETES MELLITUS TYPE 2, INSULIN DEPENDENT (HCC): ICD-10-CM

## 2021-02-05 DIAGNOSIS — E03.9 HYPOTHYROIDISM, UNSPECIFIED TYPE: ICD-10-CM

## 2021-02-05 DIAGNOSIS — E11.9 DIABETES MELLITUS TYPE 2, INSULIN DEPENDENT (HCC): ICD-10-CM

## 2021-02-05 DIAGNOSIS — L71.9 ROSACEA: ICD-10-CM

## 2021-02-05 PROCEDURE — 99214 OFFICE O/P EST MOD 30 MIN: CPT | Performed by: NURSE PRACTITIONER

## 2021-02-05 RX ORDER — DOXYCYCLINE HYCLATE 50 MG/1
50 CAPSULE ORAL 2 TIMES DAILY
Qty: 60 CAPSULE | Refills: 1 | Status: SHIPPED | OUTPATIENT
Start: 2021-02-05 | End: 2021-03-23

## 2021-02-05 NOTE — PROGRESS NOTES
"Chief Complaint  Med Management    Subjective          Mendy Merritt presents to [facility] for   Visual disturbance, cold sweats, due to low blood sugars  Now only taking metformin 2 in am and 1 in pm    Resumed metformin after d/c, experienced cramping, foot raising, bilateral intermittent, very painful, causing tears,   Stopped invokana, cramping went away,         Objective   Vital Signs:   /100   Pulse 110   Temp 96.2 °F (35.7 °C) (Temporal)   Resp 16   Ht 154.9 cm (61\")   Wt 90.3 kg (199 lb)   SpO2 97%   BMI 37.60 kg/m²     Physical Exam  Constitutional:       Appearance: Normal appearance.   Cardiovascular:      Rate and Rhythm: Normal rate and regular rhythm.      Heart sounds: No murmur. No friction rub. No gallop.    Pulmonary:      Effort: Pulmonary effort is normal. No respiratory distress.      Breath sounds: Normal breath sounds. No wheezing, rhonchi or rales.   Skin:     Comments: Erythema, closed lesions on cheeks and nose, no pustules currently   Neurological:      Mental Status: She is alert and oriented to person, place, and time.   Psychiatric:         Mood and Affect: Mood normal.         Behavior: Behavior normal.        Result Review :{ Labs  Result Review  Imaging  Med Tab  Media :23}                 Assessment and Plan    Problem List Items Addressed This Visit        Endocrine and Metabolic    Diabetes (CMS/HCC) - Primary    Hypothyroidism    Relevant Orders    TSH Rfx On Abnormal To Free T4      Other Visit Diagnoses     Chronic kidney disease, unspecified CKD stage        Relevant Orders    Comprehensive metabolic panel    Diabetes mellitus type 2, insulin dependent (CMS/HCC)        Relevant Orders    Comprehensive metabolic panel    Hemoglobin A1c    Rosacea              Follow Up   No follow-ups on file.  Patient was given instructions and counseling regarding her condition or for health maintenance advice. Please see specific information " pulled into the AVS if appropriate.     Will try new rx via compound pharmacy and add doxycycline for the next 2-3 months for rosacea    bp is increased, at last visit, advised patient to d/c arb and start amlodipine secondary to kidney function decreased. She states she is not taking that medication  And was not aware to pick it up, her bp is increased today at 150/100, rx is available at local pharmacy, she is aware she needs to pick this up and start taking it.    Diabetes is not well controlled, medications are being adjusted via endocrinology, new referral placed to endo in [place]    Follow up in 3-4 months, sooner if needed    Labs today to monitor kidney function of ace/arb/diuretic and diabetes    Time spent 45 minutes including 30 minutes face to face with patient discussing medical conditions and medication treatments and plan.

## 2021-02-06 LAB
ALBUMIN SERPL-MCNC: 4.2 G/DL (ref 3.5–5.2)
ALBUMIN/GLOB SERPL: 1.4 G/DL
ALP SERPL-CCNC: 108 U/L (ref 39–117)
ALT SERPL-CCNC: 15 U/L (ref 1–33)
AST SERPL-CCNC: 14 U/L (ref 1–32)
BILIRUB SERPL-MCNC: 0.6 MG/DL (ref 0–1.2)
BUN SERPL-MCNC: 26 MG/DL (ref 8–23)
BUN/CREAT SERPL: 14.8 (ref 7–25)
CALCIUM SERPL-MCNC: 9.8 MG/DL (ref 8.6–10.5)
CHLORIDE SERPL-SCNC: 92 MMOL/L (ref 98–107)
CO2 SERPL-SCNC: 25.3 MMOL/L (ref 22–29)
CREAT SERPL-MCNC: 1.76 MG/DL (ref 0.57–1)
GLOBULIN SER CALC-MCNC: 3 GM/DL
GLUCOSE SERPL-MCNC: 733 MG/DL (ref 65–99)
HBA1C MFR BLD: 12.7 % (ref 4.8–5.6)
POTASSIUM SERPL-SCNC: 5.8 MMOL/L (ref 3.5–5.2)
PROT SERPL-MCNC: 7.2 G/DL (ref 6–8.5)
SODIUM SERPL-SCNC: 131 MMOL/L (ref 136–145)
T4 FREE SERPL-MCNC: 1.98 NG/DL (ref 0.93–1.7)
TSH SERPL DL<=0.005 MIU/L-ACNC: 0.13 UIU/ML (ref 0.27–4.2)

## 2021-02-06 NOTE — PROGRESS NOTES
Received critical lab value of glucose of 733.  Contacted pt who feels well.  She has not been checking her blood sugars x 3 weeks and has stopped her metformin as she thought it was making her too low in the middle of the night  She is currently under the care of an endocrinologist in [place] and has been trying to get established with endocrinologist in [place].  She did not inform her endocrinologist of med changes she has made herself.     Pt has been advised to find her glucometer and check her blood sugars, contact her current endocrinologist with her blood sugars.  If she has s/s of hyper/hypo glycemia she should seek emergency care.      She needs to make sure she continues care with current endocrinologist until she is established with new one.

## 2021-02-07 DIAGNOSIS — E11.29 TYPE 2 DIABETES MELLITUS WITH OTHER DIABETIC KIDNEY COMPLICATION, WITH LONG-TERM CURRENT USE OF INSULIN (HCC): ICD-10-CM

## 2021-02-07 DIAGNOSIS — Z79.4 TYPE 2 DIABETES MELLITUS WITH OTHER DIABETIC KIDNEY COMPLICATION, WITH LONG-TERM CURRENT USE OF INSULIN (HCC): ICD-10-CM

## 2021-02-08 RX ORDER — FLASH GLUCOSE SCANNING READER
EACH MISCELLANEOUS
Qty: 1 EACH | Refills: 1 | Status: SHIPPED | OUTPATIENT
Start: 2021-02-08

## 2021-02-09 ENCOUNTER — CLINICAL SUPPORT (OUTPATIENT)
Dept: FAMILY MEDICINE CLINIC | Facility: CLINIC | Age: 71
End: 2021-02-09

## 2021-02-09 VITALS — HEART RATE: 112 BPM | OXYGEN SATURATION: 97 % | DIASTOLIC BLOOD PRESSURE: 92 MMHG | SYSTOLIC BLOOD PRESSURE: 134 MMHG

## 2021-02-09 DIAGNOSIS — E08.29 DIABETES MELLITUS DUE TO UNDERLYING CONDITION WITH OTHER DIABETIC KIDNEY COMPLICATION, WITHOUT LONG-TERM CURRENT USE OF INSULIN (HCC): Primary | ICD-10-CM

## 2021-02-09 DIAGNOSIS — E08.29 DIABETES MELLITUS DUE TO UNDERLYING CONDITION WITH OTHER DIABETIC KIDNEY COMPLICATION, WITHOUT LONG-TERM CURRENT USE OF INSULIN (HCC): ICD-10-CM

## 2021-02-09 NOTE — PROGRESS NOTES
Pt comes in today for labs and requested to have her BP check. Pt asked for lower arm to be done. Advised her that this was not the most accurate of readings. Voiced understanding. This nurse used her lower forearm for BP due to patient stating unable to use right side. Her reading today was 134/92.

## 2021-02-10 LAB
BUN SERPL-MCNC: 24 MG/DL (ref 8–23)
BUN/CREAT SERPL: 14.2 (ref 7–25)
CALCIUM SERPL-MCNC: 9.9 MG/DL (ref 8.6–10.5)
CHLORIDE SERPL-SCNC: 96 MMOL/L (ref 98–107)
CO2 SERPL-SCNC: 24.9 MMOL/L (ref 22–29)
CREAT SERPL-MCNC: 1.69 MG/DL (ref 0.57–1)
GLUCOSE SERPL-MCNC: 346 MG/DL (ref 65–99)
POTASSIUM SERPL-SCNC: 4.5 MMOL/L (ref 3.5–5.2)
SODIUM SERPL-SCNC: 136 MMOL/L (ref 136–145)

## 2021-02-10 NOTE — TELEPHONE ENCOUNTER
PATIENT CALLED IN STATING THAT THE APPOINTMENT THAT WAS MADE FOR HER WITH THE ENDOCRINOLOGIST WAS FOR THE SAME ONE THAT SHE WAS SEEING IN [place]. PATIENT WOULD LIKE TO BE SEEN BY ONE IN THE [place] AREA. PATIENT CANCELLED APPOINTMENT THAT WAS MADE FOR TODAY. PLEASE ADVISE.

## 2021-02-10 NOTE — TELEPHONE ENCOUNTER
Spoke with patient and will now be redirected to the [facility] and not [facility]. I advised her that once the new endo has been placed someone will call her to set up her appointment.

## 2021-02-17 ENCOUNTER — TELEPHONE (OUTPATIENT)
Dept: FAMILY MEDICINE CLINIC | Facility: CLINIC | Age: 71
End: 2021-02-17

## 2021-02-17 DIAGNOSIS — E78.00 PURE HYPERCHOLESTEROLEMIA: ICD-10-CM

## 2021-02-17 RX ORDER — METFORMIN HYDROCHLORIDE 500 MG/1
1000 TABLET, EXTENDED RELEASE ORAL 2 TIMES DAILY
Qty: 360 TABLET | Refills: 1 | Status: SHIPPED | OUTPATIENT
Start: 2021-02-17 | End: 2021-06-16

## 2021-02-17 RX ORDER — LEVOTHYROXINE SODIUM 137 UG/1
137 TABLET ORAL DAILY
Qty: 90 TABLET | Refills: 1 | Status: SHIPPED | OUTPATIENT
Start: 2021-02-17 | End: 2021-08-13

## 2021-02-17 RX ORDER — ATORVASTATIN CALCIUM 80 MG/1
80 TABLET, FILM COATED ORAL DAILY
Qty: 90 TABLET | Refills: 3 | Status: SHIPPED | OUTPATIENT
Start: 2021-02-17 | End: 2021-11-15

## 2021-02-17 NOTE — TELEPHONE ENCOUNTER
PT CALLED REQUESTING A REFILL OF METFORMIN, ADORVASTATIN, AND SYNTHROID TO BE S[facility] [place] [address] [address]. PLEASE AND THANK YOU

## 2021-02-18 RX ORDER — INSULIN DEGLUDEC INJECTION 100 U/ML
INJECTION, SOLUTION SUBCUTANEOUS
Qty: 36 ML | Refills: 1 | Status: SHIPPED | OUTPATIENT
Start: 2021-02-18 | End: 2021-04-15 | Stop reason: CLARIF

## 2021-02-18 NOTE — TELEPHONE ENCOUNTER
[facility] IN [facility] CALLED REQUESTING A REFILL OF TRESIBA FOR THIS PT. PLEASE AND THANK YOU

## 2021-02-26 ENCOUNTER — TELEPHONE (OUTPATIENT)
Dept: FAMILY MEDICINE CLINIC | Facility: CLINIC | Age: 71
End: 2021-02-26

## 2021-02-26 NOTE — TELEPHONE ENCOUNTER
Caller: Mendy Merritt    Relationship to patient: Self    Best call back number: [phone]       Patient is needing: Patient called in and stated she is following up on her referral for endocrinology on  7/21/2021 and she would like to know if she can do anything to get her in sooner or if she can take blood work for the endocrinology . Patient stated it takes two hours just to get to the location is not good she has been working on this for 8-10 weeks .  Patient stated she was told not to leave any messages for endocrinologist it will slowed down the process and all she received is a letter yesterday 2/25/2021  in the mail .     Please call and advise patient .

## 2021-03-03 ENCOUNTER — TELEPHONE (OUTPATIENT)
Dept: FAMILY MEDICINE CLINIC | Facility: CLINIC | Age: 71
End: 2021-03-03

## 2021-03-23 RX ORDER — DOXYCYCLINE HYCLATE 50 MG/1
CAPSULE ORAL
Qty: 60 CAPSULE | Refills: 1 | Status: SHIPPED | OUTPATIENT
Start: 2021-03-23 | End: 2021-04-20

## 2021-04-05 DIAGNOSIS — Z79.4 TYPE 2 DIABETES MELLITUS WITH OTHER DIABETIC KIDNEY COMPLICATION, WITH LONG-TERM CURRENT USE OF INSULIN (HCC): ICD-10-CM

## 2021-04-05 DIAGNOSIS — E11.29 TYPE 2 DIABETES MELLITUS WITH OTHER DIABETIC KIDNEY COMPLICATION, WITH LONG-TERM CURRENT USE OF INSULIN (HCC): ICD-10-CM

## 2021-04-05 NOTE — TELEPHONE ENCOUNTER
PATIENT IS REQUESTING A REFILL ON HER CHARLENE SENSORS. SHE STATES THAT SHE IS NOT ABLE TO GET IN WITH ENDO IN [place] UNTIL July. [facility]

## 2021-04-15 ENCOUNTER — TELEPHONE (OUTPATIENT)
Dept: ENDOCRINOLOGY | Facility: CLINIC | Age: 71
End: 2021-04-15

## 2021-04-15 NOTE — TELEPHONE ENCOUNTER
PHARMACY CALLED STATING THAT TRESIBA IS NOT ON THE PATIENT'S FORMULARY AND WOULD LIKE TO KNOW IF THIS CAN BE CHANGED TO LANTUS OR LEVEMIR AS BOTH OF THOSE ARE ON FORMULARY WITH THE PATIENT'S INSURANCE.

## 2021-04-16 ENCOUNTER — TELEPHONE (OUTPATIENT)
Dept: FAMILY MEDICINE CLINIC | Facility: CLINIC | Age: 71
End: 2021-04-16

## 2021-04-16 NOTE — TELEPHONE ENCOUNTER
THIS MESSAGE IS FOR:  FELIPA[facility]K       PATIENT CALLED STATING:     HAD EYE SURGERY  CHERRY THIS YEAR  HAS BEEN FOLLOWING UP WITH HER SURGEON     . SAID INCREASING DAMAGE DUE TO DIABETES     NEXT ENDO APPT IS IN July AND THE PATIENT   NEEDS SOONER THAN JULY    HAD ALL VACCINES    LOOKING FOR SUGGESTIONS ON GETTING IN SOONER  VERY CONCERNED ABOUT THE APPT BEING IN JULY     HER SUGAR READINGS HAVE BEEN EITHER VERY LOW TO VERY HIGH    ALSO     BC/BS IS NOT COVERING TRASEBA  --TAKING AT NIGHT- WHEN SUGAR DROPS VERY LOW.        PATIENT IS REQUESTING:      SUGGESTIONS REGARDING MOVING  HER ENDOCRINOLOGY APPT     IF SHE NEEDS APPT, LET HER KNOW    HER ENDOCRINOLOGIST TOLD HER TO STOP CALLING THEM       [id number] [place], [place] - [address] AT [facility] - [phone] [place] [phone]   [phone]    PLEASE ADVISE/CALL PATIENT IF YOU HAVE ANY QUESTIONS- PHONE NUMBER:     Mendy Merritt (Self) [phone] ()

## 2021-04-20 ENCOUNTER — OFFICE VISIT (OUTPATIENT)
Dept: FAMILY MEDICINE CLINIC | Facility: CLINIC | Age: 71
End: 2021-04-20

## 2021-04-20 VITALS
SYSTOLIC BLOOD PRESSURE: 164 MMHG | RESPIRATION RATE: 16 BRPM | HEART RATE: 102 BPM | DIASTOLIC BLOOD PRESSURE: 110 MMHG | WEIGHT: 197 LBS | HEIGHT: 61 IN | TEMPERATURE: 96.2 F | OXYGEN SATURATION: 98 % | BODY MASS INDEX: 37.19 KG/M2

## 2021-04-20 DIAGNOSIS — Z13.220 SCREENING FOR LIPOID DISORDERS: ICD-10-CM

## 2021-04-20 DIAGNOSIS — E08.29 DIABETES MELLITUS DUE TO UNDERLYING CONDITION WITH OTHER DIABETIC KIDNEY COMPLICATION, WITHOUT LONG-TERM CURRENT USE OF INSULIN (HCC): Primary | ICD-10-CM

## 2021-04-20 DIAGNOSIS — N18.9 CHRONIC KIDNEY DISEASE, UNSPECIFIED CKD STAGE: ICD-10-CM

## 2021-04-20 DIAGNOSIS — E03.9 HYPOTHYROIDISM, UNSPECIFIED TYPE: ICD-10-CM

## 2021-04-20 DIAGNOSIS — I10 ESSENTIAL HYPERTENSION: ICD-10-CM

## 2021-04-20 LAB
ALBUMIN SERPL-MCNC: 4.2 G/DL (ref 3.5–5.2)
ALBUMIN/GLOB SERPL: 1.5 G/DL
ALP SERPL-CCNC: 97 U/L (ref 39–117)
ALT SERPL-CCNC: 13 U/L (ref 1–33)
AST SERPL-CCNC: 11 U/L (ref 1–32)
BILIRUB SERPL-MCNC: 0.4 MG/DL (ref 0–1.2)
BUN SERPL-MCNC: 21 MG/DL (ref 8–23)
BUN/CREAT SERPL: 15 (ref 7–25)
CALCIUM SERPL-MCNC: 10 MG/DL (ref 8.6–10.5)
CHLORIDE SERPL-SCNC: 102 MMOL/L (ref 98–107)
CHOLEST SERPL-MCNC: 169 MG/DL (ref 0–200)
CO2 SERPL-SCNC: 22.6 MMOL/L (ref 22–29)
CREAT SERPL-MCNC: 1.4 MG/DL (ref 0.57–1)
GLOBULIN SER CALC-MCNC: 2.8 GM/DL
GLUCOSE SERPL-MCNC: 127 MG/DL (ref 65–99)
HBA1C MFR BLD: 9.5 % (ref 4.8–5.6)
HDLC SERPL-MCNC: 51 MG/DL (ref 40–60)
LDLC SERPL CALC-MCNC: 93 MG/DL (ref 0–100)
LDLC/HDLC SERPL: 1.74 {RATIO}
POTASSIUM SERPL-SCNC: 4.3 MMOL/L (ref 3.5–5.2)
PROT SERPL-MCNC: 7 G/DL (ref 6–8.5)
SODIUM SERPL-SCNC: 138 MMOL/L (ref 136–145)
TRIGL SERPL-MCNC: 146 MG/DL (ref 0–150)
TSH SERPL DL<=0.005 MIU/L-ACNC: 0.69 UIU/ML (ref 0.27–4.2)
VLDLC SERPL CALC-MCNC: 25 MG/DL (ref 5–40)

## 2021-04-20 PROCEDURE — 99214 OFFICE O/P EST MOD 30 MIN: CPT | Performed by: NURSE PRACTITIONER

## 2021-04-20 RX ORDER — AMLODIPINE BESYLATE 10 MG/1
10 TABLET ORAL DAILY
Qty: 30 TABLET | Refills: 5 | Status: SHIPPED | OUTPATIENT
Start: 2021-04-20 | End: 2021-06-16

## 2021-04-20 NOTE — PROGRESS NOTES
"Chief Complaint  Diabetes    Subjective          Mendy Merritt presents to [facility]  BP previously on low side of normal   Now elevated, started amlopidipine at last visit, taking daily, bp elevated at 164/110    low blood sugars in am, then gradually increases over the afternoon, as high as 260-270 a few over 400  Decreasing carbs and meat    Taking tresiba 28 units at bedtime    Diabetes  She presents for her follow-up diabetic visit. She has type 2 diabetes mellitus. Hypoglycemia symptoms include confusion (possible). Pertinent negatives for hypoglycemia include no headaches or sweats. Pertinent negatives for diabetes include no blurred vision and no chest pain. Risk factors for coronary artery disease include diabetes mellitus, hypertension and obesity. Current diabetic treatment includes insulin injections and oral agent (triple therapy). She is compliant with treatment most of the time.   Hypertension  This is a chronic problem. The current episode started more than 1 month ago. The problem has been waxing and waning since onset. The problem is uncontrolled. Pertinent negatives include no anxiety, blurred vision, chest pain, headaches, malaise/fatigue, neck pain, orthopnea, palpitations, peripheral edema, PND, shortness of breath or sweats. There are no associated agents to hypertension. Risk factors for coronary artery disease include diabetes mellitus and obesity. Past treatments include calcium channel blockers. Current antihypertension treatment includes calcium channel blockers. The current treatment provides no improvement. Compliance problems include diet and exercise.        Objective   Vital Signs:   BP (!) 164/110   Pulse 102   Temp 96.2 °F (35.7 °C) (Temporal)   Resp 16   Ht 154.9 cm (61\")   Wt 89.4 kg (197 lb)   SpO2 98%   BMI 37.22 kg/m²     Physical Exam  Vitals reviewed.   Constitutional:       General: She is not in acute distress.     Appearance: Normal " appearance. She is well-developed. She is not diaphoretic.   Cardiovascular:      Rate and Rhythm: Normal rate and regular rhythm.      Heart sounds: Normal heart sounds. No murmur heard.   No friction rub. No gallop.    Pulmonary:      Effort: Pulmonary effort is normal. No respiratory distress.      Breath sounds: Normal breath sounds. No wheezing or rales.   Abdominal:      General: Bowel sounds are normal. There is no distension.      Palpations: Abdomen is soft.      Tenderness: There is no abdominal tenderness.   Musculoskeletal:      Cervical back: Neck supple.   Skin:     General: Skin is warm and dry.   Neurological:      Mental Status: She is alert and oriented to person, place, and time.        Result Review :                 Assessment and Plan    Diagnoses and all orders for this visit:    1. Diabetes mellitus due to underlying condition with other diabetic kidney complication, without long-term current use of insulin (CMS/[facility]) (Primary)  -     Hemoglobin A1c  -     Comprehensive metabolic panel    2. Essential hypertension  -     Hemoglobin A1c  -     Comprehensive metabolic panel    3. Chronic kidney disease, unspecified CKD stage  -     Comprehensive metabolic panel    4. Hypothyroidism, unspecified type  -     TSH Rfx On Abnormal To Free T4    5. Screening for lipoid disorders  -     Lipid Panel With LDL / HDL Ratio    Other orders  -     amLODIPine (NORVASC) 10 MG tablet; Take 1 tablet by mouth Daily.  Dispense: 30 tablet; Refill: 5        Follow Up   No follow-ups on file.  Patient was given instructions and counseling regarding her condition or for health maintenance advice. Please see specific information pulled into the AVS if appropriate.     Patient is diabetic, not well controlled, not compliant. Previous follow up visits with endocrinology were cancelled by patient. She is located in [place] now and he is in [place], however telehealth is available. Her last A1C was > 12, she is taking  insulin. Her bp is increased, was prescribed amlodipine, did not start it after one visit and returned in Feb with worsening hypertension. She does have significant pain in her upper arms when getting her bp checked. Concerned with these high sugars that she is having some confusion and discussed with patient regarding cancelling last appointment. It was recorded that she called to cancel, however she does not recall cancelling that visit.   Her glucose was noted to be > 700

## 2021-06-01 RX ORDER — DOXYCYCLINE HYCLATE 50 MG/1
CAPSULE ORAL
Qty: 60 CAPSULE | Refills: 1 | Status: SHIPPED | OUTPATIENT
Start: 2021-06-01 | End: 2021-07-21

## 2021-06-08 ENCOUNTER — OFFICE VISIT (OUTPATIENT)
Dept: FAMILY MEDICINE CLINIC | Facility: CLINIC | Age: 71
End: 2021-06-08

## 2021-06-08 ENCOUNTER — LAB (OUTPATIENT)
Dept: LAB | Facility: HOSPITAL | Age: 71
End: 2021-06-08

## 2021-06-08 ENCOUNTER — HOSPITAL ENCOUNTER (OUTPATIENT)
Dept: CARDIOLOGY | Facility: HOSPITAL | Age: 71
Discharge: HOME | End: 2021-06-08

## 2021-06-08 VITALS
BODY MASS INDEX: 37.12 KG/M2 | HEIGHT: 61 IN | OXYGEN SATURATION: 97 % | SYSTOLIC BLOOD PRESSURE: 168 MMHG | WEIGHT: 196.6 LBS | TEMPERATURE: 97.7 F | HEART RATE: 108 BPM | DIASTOLIC BLOOD PRESSURE: 107 MMHG

## 2021-06-08 DIAGNOSIS — I10 ESSENTIAL HYPERTENSION: ICD-10-CM

## 2021-06-08 DIAGNOSIS — R22.42 LOCALIZED SWELLING OF LEFT LOWER LEG: Primary | ICD-10-CM

## 2021-06-08 DIAGNOSIS — R22.42 LOCALIZED SWELLING OF LEFT LOWER LEG: ICD-10-CM

## 2021-06-08 DIAGNOSIS — Z79.4 TYPE 2 DIABETES MELLITUS WITH HYPOGLYCEMIA WITHOUT COMA, WITH LONG-TERM CURRENT USE OF INSULIN (HCC): ICD-10-CM

## 2021-06-08 DIAGNOSIS — E11.649 TYPE 2 DIABETES MELLITUS WITH HYPOGLYCEMIA WITHOUT COMA, WITH LONG-TERM CURRENT USE OF INSULIN (HCC): ICD-10-CM

## 2021-06-08 LAB
ANION GAP SERPL CALCULATED.3IONS-SCNC: 13.9 MMOL/L (ref 5–15)
BASOPHILS # BLD AUTO: 0.01 10*3/MM3 (ref 0–0.2)
BASOPHILS NFR BLD AUTO: 0.1 % (ref 0–1.5)
BH CV LOWER VASCULAR LEFT COMMON FEMORAL AUGMENT: NORMAL
BH CV LOWER VASCULAR LEFT COMMON FEMORAL COMPETENT: NORMAL
BH CV LOWER VASCULAR LEFT COMMON FEMORAL COMPRESS: NORMAL
BH CV LOWER VASCULAR LEFT COMMON FEMORAL PHASIC: NORMAL
BH CV LOWER VASCULAR LEFT COMMON FEMORAL SPONT: NORMAL
BH CV LOWER VASCULAR LEFT DISTAL FEMORAL COMPRESS: NORMAL
BH CV LOWER VASCULAR LEFT GASTRONEMIUS COMPRESS: NORMAL
BH CV LOWER VASCULAR LEFT GREATER SAPH AK COMPRESS: NORMAL
BH CV LOWER VASCULAR LEFT GREATER SAPH BK COMPRESS: NORMAL
BH CV LOWER VASCULAR LEFT LESSER SAPH COMPRESS: NORMAL
BH CV LOWER VASCULAR LEFT MID FEMORAL AUGMENT: NORMAL
BH CV LOWER VASCULAR LEFT MID FEMORAL COMPETENT: NORMAL
BH CV LOWER VASCULAR LEFT MID FEMORAL COMPRESS: NORMAL
BH CV LOWER VASCULAR LEFT MID FEMORAL PHASIC: NORMAL
BH CV LOWER VASCULAR LEFT MID FEMORAL SPONT: NORMAL
BH CV LOWER VASCULAR LEFT PERONEAL COMPRESS: NORMAL
BH CV LOWER VASCULAR LEFT POPLITEAL AUGMENT: NORMAL
BH CV LOWER VASCULAR LEFT POPLITEAL COMPETENT: NORMAL
BH CV LOWER VASCULAR LEFT POPLITEAL COMPRESS: NORMAL
BH CV LOWER VASCULAR LEFT POPLITEAL PHASIC: NORMAL
BH CV LOWER VASCULAR LEFT POPLITEAL SPONT: NORMAL
BH CV LOWER VASCULAR LEFT POSTERIOR TIBIAL COMPRESS: NORMAL
BH CV LOWER VASCULAR LEFT PROFUNDA FEMORAL COMPRESS: NORMAL
BH CV LOWER VASCULAR LEFT PROXIMAL FEMORAL COMPRESS: NORMAL
BH CV LOWER VASCULAR LEFT SAPHENOFEMORAL JUNCTION COMPRESS: NORMAL
BH CV LOWER VASCULAR RIGHT COMMON FEMORAL AUGMENT: NORMAL
BH CV LOWER VASCULAR RIGHT COMMON FEMORAL COMPETENT: NORMAL
BH CV LOWER VASCULAR RIGHT COMMON FEMORAL COMPRESS: NORMAL
BH CV LOWER VASCULAR RIGHT COMMON FEMORAL PHASIC: NORMAL
BH CV LOWER VASCULAR RIGHT COMMON FEMORAL SPONT: NORMAL
BUN SERPL-MCNC: 21 MG/DL (ref 8–23)
BUN/CREAT SERPL: 11.7 (ref 7–25)
CALCIUM SPEC-SCNC: 9.9 MG/DL (ref 8.6–10.5)
CHLORIDE SERPL-SCNC: 103 MMOL/L (ref 98–107)
CO2 SERPL-SCNC: 22.1 MMOL/L (ref 22–29)
CREAT SERPL-MCNC: 1.8 MG/DL (ref 0.57–1)
DEPRECATED RDW RBC AUTO: 43.8 FL (ref 37–54)
EOSINOPHIL # BLD AUTO: 0.31 10*3/MM3 (ref 0–0.4)
EOSINOPHIL NFR BLD AUTO: 2.7 % (ref 0.3–6.2)
ERYTHROCYTE [DISTWIDTH] IN BLOOD BY AUTOMATED COUNT: 13.7 % (ref 12.3–15.4)
GFR SERPL CREATININE-BSD FRML MDRD: 28 ML/MIN/1.73
GLUCOSE SERPL-MCNC: 207 MG/DL (ref 65–99)
HCT VFR BLD AUTO: 40.5 % (ref 34–46.6)
HGB BLD-MCNC: 13.3 G/DL (ref 12–15.9)
IMM GRANULOCYTES # BLD AUTO: 0.06 10*3/MM3 (ref 0–0.05)
IMM GRANULOCYTES NFR BLD AUTO: 0.5 % (ref 0–0.5)
LYMPHOCYTES # BLD AUTO: 2.84 10*3/MM3 (ref 0.7–3.1)
LYMPHOCYTES NFR BLD AUTO: 24.7 % (ref 19.6–45.3)
MCH RBC QN AUTO: 28.7 PG (ref 26.6–33)
MCHC RBC AUTO-ENTMCNC: 32.8 G/DL (ref 31.5–35.7)
MCV RBC AUTO: 87.3 FL (ref 79–97)
MONOCYTES # BLD AUTO: 0.65 10*3/MM3 (ref 0.1–0.9)
MONOCYTES NFR BLD AUTO: 5.6 % (ref 5–12)
NEUTROPHILS NFR BLD AUTO: 66.4 % (ref 42.7–76)
NEUTROPHILS NFR BLD AUTO: 7.65 10*3/MM3 (ref 1.7–7)
NRBC BLD AUTO-RTO: 0 /100 WBC (ref 0–0.2)
PLATELET # BLD AUTO: 286 10*3/MM3 (ref 140–450)
PMV BLD AUTO: 9.5 FL (ref 6–12)
POTASSIUM SERPL-SCNC: 4.1 MMOL/L (ref 3.5–5.2)
RBC # BLD AUTO: 4.64 10*6/MM3 (ref 3.77–5.28)
SODIUM SERPL-SCNC: 139 MMOL/L (ref 136–145)
WBC # BLD AUTO: 11.52 10*3/MM3 (ref 3.4–10.8)

## 2021-06-08 PROCEDURE — 85025 COMPLETE CBC W/AUTO DIFF WBC: CPT | Performed by: NURSE PRACTITIONER

## 2021-06-08 PROCEDURE — 80048 BASIC METABOLIC PNL TOTAL CA: CPT | Performed by: NURSE PRACTITIONER

## 2021-06-08 PROCEDURE — 99214 OFFICE O/P EST MOD 30 MIN: CPT | Performed by: NURSE PRACTITIONER

## 2021-06-08 PROCEDURE — 36415 COLL VENOUS BLD VENIPUNCTURE: CPT | Performed by: NURSE PRACTITIONER

## 2021-06-08 PROCEDURE — 93971 EXTREMITY STUDY: CPT

## 2021-06-08 NOTE — PROGRESS NOTES
"Chief Complaint  Medication side effects (Pt states that the new medication that she started taking for Diabetes(Lantus Solastar) is causing her side effects, she felt tired all the time, pt L feet is swollen, x about 3 to 4 weeks. )    Subjective          Mendy Merritt presents to [facility]  History of Present Illness   New patient to me here for left leg swelling and more concerning fatigue for about the last 4 to 5 days.  She has also been having hypoglycemia overnight.    Her left leg has been pretty swollen since she started taking 10 mg of amlodipine for high blood pressure approximately 1 month ago at follow-up with PCP.  Patient had inadvertently never started on the 5 mg of amlodipine and when her blood pressure was still elevated she was increased to 10 at that visit without ever having taken the 5 mg which patient soon after realized.  She did go ahead and start the 10 mg with onset of leg swelling.  Her leg is not painful and her left leg does tend to swell intermittently anyway but has gotten much worse since starting the amlodipine.  The swelling does seem to be improved in the morning but as soon as she starts walking it becomes painful.    Patient does admit that with her overwhelming fatigue she has been sitting around a lot more of the about the last week as she feels too tired to do anything.    She had been doing pretty well on Tresiba but about a month ago had to be switched to Lantus due to insurance, and since then her blood sugars have been \"terrible\".  She is having highs as high as 500 in the afternoon but more normally in the mid to high 200s starting around lunchtime.  But she is having hypoglycemia overnight with her freestyle sebas documenting blood sugars as low as 40 and sometimes too low to read.  She is able to get her blood sugars up with Coke and snack but feels terrible.  Wakes up sweating profusely.    She continues the daily Victoza and her " "Metformin twice per day as directed.    She was under the care of an endocrinologist in [place] but has not wanted to drive all the way back to [place].  She is awaiting an upcoming appointment with an endocrinologist here but not until mid July.    Has had elevated creatinines and says she has been seen by nephrology in the past and her creatinines do seem to improve with additional hydration.      Objective   Vital Signs:   BP (!) 168/107   Pulse 108   Temp 97.7 °F (36.5 °C)   Ht 154.9 cm (61\")   Wt 89.2 kg (196 lb 9.6 oz)   SpO2 97%   BMI 37.15 kg/m²     Physical Exam  Vitals and nursing note reviewed.   Constitutional:       General: She is not in acute distress.     Appearance: She is well-developed. She is obese. She is not ill-appearing or diaphoretic.   HENT:      Head: Normocephalic and atraumatic.   Eyes:      General:         Right eye: No discharge.         Left eye: No discharge.      Conjunctiva/sclera: Conjunctivae normal.   Cardiovascular:      Rate and Rhythm: Normal rate and regular rhythm.   Pulmonary:      Effort: Pulmonary effort is normal.      Breath sounds: Normal breath sounds.   Abdominal:      General: Bowel sounds are normal.      Palpations: Abdomen is soft.      Tenderness: There is no abdominal tenderness.   Musculoskeletal:         General: No deformity.      Right lower leg: Edema (1+) present.      Left lower leg: Edema (3+) present.      Comments: No calf pain,erythema   Skin:     General: Skin is warm and dry.   Neurological:      Mental Status: She is alert and oriented to person, place, and time.        Result Review :   The following data was reviewed by: BRIANNA Travis on 06/08/2021:  CBC    CBC 10/23/20 6/8/21   WBC 9.14 11.52 (A)   RBC 4.61 4.64   Hemoglobin 13.1 13.3   Hematocrit 40.4 40.5   MCV 87.6 87.3   MCH 28.4 28.7   MCHC 32.4 32.8   RDW 13.3 13.7   Platelets 240 286   (A) Abnormal value            BMP    BMP 2/9/21 4/20/21 6/8/21   Glucose 346 (A) " 127 (A)    BUN 24 (A) 21 21   Creatinine 1.69 (A) 1.40 (A) 1.80 (A)   Sodium 136 138 139   Potassium 4.5 4.3 4.1   Chloride 96 (A) 102 103   CO2 24.9 22.6 22.1   Calcium 9.9 10.0 9.9   (A) Abnormal value            Data reviewed: last PCP note from end of April          Assessment and Plan    Diagnoses and all orders for this visit:    1. Localized swelling of left lower leg (Primary)  -     Duplex Venous Lower Extremity - Left CAR; Future  -     CBC & Differential  -     Basic metabolic panel    2. Type 2 diabetes mellitus with hypoglycemia without coma, with long-term current use of insulin (CMS/[facility])    3. Essential hypertension  -     Basic metabolic panel    Other orders  -     Insulin Glargine (LANTUS SOLOSTAR) 100 UNIT/ML injection pen; 45 units daily  Dispense: 40 mL; Refill: 3          Stat doppler ordered.  Hold amlodipine as I think it may be causing leg swelling and follow-up with PCP for blood pressure medications if needed next week.  She seems to have okay blood pressure currently.    Decrease lantus at night to 30 Units and let us know how  sugars are in am.      She has not been eating a bedtime snack, we discussed appropriate bedtime snacks.  We discussed goals for fasting blood sugar as well as postprandial blood sugars 2 hours after meal.    We will get a CBC and BMP for her fatigue which I suspect is due to her sugars bouncing around so much.    She is aware of signs and symptoms that require emergent evaluation or ER care.      Follow Up   No follow-ups on file.  Patient was given instructions and counseling regarding her condition or for health maintenance advice. Please see specific information pulled into the AVS if appropriate.

## 2021-06-16 ENCOUNTER — OFFICE VISIT (OUTPATIENT)
Dept: FAMILY MEDICINE CLINIC | Facility: CLINIC | Age: 71
End: 2021-06-16

## 2021-06-16 VITALS
WEIGHT: 195.7 LBS | HEART RATE: 102 BPM | BODY MASS INDEX: 36.95 KG/M2 | TEMPERATURE: 96.4 F | DIASTOLIC BLOOD PRESSURE: 100 MMHG | RESPIRATION RATE: 18 BRPM | SYSTOLIC BLOOD PRESSURE: 146 MMHG | HEIGHT: 61 IN | OXYGEN SATURATION: 95 %

## 2021-06-16 DIAGNOSIS — Z78.0 POSTMENOPAUSAL: ICD-10-CM

## 2021-06-16 DIAGNOSIS — R53.83 FATIGUE, UNSPECIFIED TYPE: Primary | ICD-10-CM

## 2021-06-16 DIAGNOSIS — E53.8 VITAMIN B12 DEFICIENCY: ICD-10-CM

## 2021-06-16 PROCEDURE — 99213 OFFICE O/P EST LOW 20 MIN: CPT | Performed by: NURSE PRACTITIONER

## 2021-06-16 RX ORDER — METFORMIN HYDROCHLORIDE 500 MG/1
TABLET, EXTENDED RELEASE ORAL
Qty: 360 TABLET | Refills: 0 | Status: SHIPPED | OUTPATIENT
Start: 2021-06-16 | End: 2021-10-15

## 2021-06-16 RX ORDER — METOPROLOL SUCCINATE 25 MG/1
25 TABLET, EXTENDED RELEASE ORAL DAILY
Qty: 30 TABLET | Refills: 1 | Status: SHIPPED | OUTPATIENT
Start: 2021-06-16 | End: 2021-08-19

## 2021-06-16 NOTE — PROGRESS NOTES
Chief Complaint  Diabetes (1wk FU) and Leg Swelling    Subjective          Mendy Woods presents to [facility]  Left leg swelling    Was on invokana, insurance stopped approving, placed on jardiance, developed CHERRY  Now with [facility], levels are being monitored by nephrology    Now on metformin 2,000 mg daily  Off invokana/jardiance  Off tresiba, on lantus states glucose is fluctuating  Reduced lantus 10 units at visit with [facility], states still going low, but in am, sugars are increased  Low glucose is at times so low it will not register  Currently 233, this am, low 50  Now on 30    Daytime fatigue, extremely tired, wants to sleep a lot  Sleeps 3 hours, wakes to go to restroom, then will sleep 1/2-2 hours of sleep before getting up to bathroom    Diabetes  She presents for her follow-up diabetic visit. She has type 2 diabetes mellitus. Her disease course has been stable. Pertinent negatives for hypoglycemia include no headaches. Pertinent negatives for diabetes include no chest pain, no fatigue, no visual change and no weakness. Diabetic complications include nephropathy. Risk factors for coronary artery disease include diabetes mellitus, hypertension and dyslipidemia. Current diabetic treatment includes insulin injections and oral agent (dual therapy). She is compliant with treatment most of the time. An ACE inhibitor/angiotensin II receptor blocker is contraindicated. Eye exam is current.   Leg Swelling  This is a new problem. The current episode started 1 to 4 weeks ago. The problem occurs constantly. The problem has been gradually improving. Pertinent negatives include no abdominal pain, anorexia, arthralgias, change in bowel habit, chest pain, chills, congestion, coughing, diaphoresis, fatigue, fever, headaches, joint swelling, myalgias, nausea, neck pain, numbness, rash, sore throat, swollen glands, urinary symptoms, vertigo, visual change, vomiting or weakness. Nothing  "aggravates the symptoms. Treatments tried: elevation, reduced sodium, stopped amlodipine. The treatment provided moderate relief.       Objective   Vital Signs:   /100   Pulse 102   Temp 96.4 °F (35.8 °C) (Temporal)   Resp 18   Ht 154.9 cm (61\")   Wt 88.8 kg (195 lb 11.2 oz)   SpO2 95%   BMI 36.98 kg/m²     Physical Exam  Vitals reviewed.   Constitutional:       Appearance: Normal appearance.   Cardiovascular:      Rate and Rhythm: Normal rate and regular rhythm.      Heart sounds: No murmur heard.   No friction rub. No gallop.    Pulmonary:      Effort: Pulmonary effort is normal. No respiratory distress.      Breath sounds: Normal breath sounds. No wheezing, rhonchi or rales.   Skin:     General: Skin is warm and dry.   Neurological:      Mental Status: She is alert and oriented to person, place, and time.   Psychiatric:         Mood and Affect: Mood normal.        Result Review :                 Assessment and Plan    Diagnoses and all orders for this visit:    1. Fatigue, unspecified type (Primary)  -     Vitamin D 25 hydroxy  -     Vitamin B12  -     TSH    2. Vitamin B12 deficiency  -     Vitamin B12    3. Postmenopausal  -     Vitamin D 25 hydroxy    4. Body mass index (BMI) 37.0-37.9, adult   -     Vitamin D 25 hydroxy    Other orders  -     metoprolol succinate XL (Toprol XL) 25 MG 24 hr tablet; Take 1 tablet by mouth Daily.  Dispense: 30 tablet; Refill: 1        Follow Up   No follow-ups on file.  Patient was given instructions and counseling regarding her condition or for health maintenance advice. Please see specific information pulled into the AVS if appropriate.     Negative doppler, swelling is improving since stopping amlodipine, bp is still not well controlled, recommend starting metoprolol due to side effects, ACE/ARB contraindicated given CKD  Diabetes, not well controlled, does have a new follow up with Dr. Drake next month, she will follow up with me after that.  She has reduced her " insulin by 10 units to 30 still having occasional low sugars in 50s, recommend to monitor over the next week given changes in medication and consider decreasing insulin if needed to 28 units, would recommend slow changes given brittle nature, verbalized understanding

## 2021-06-17 LAB
25(OH)D3+25(OH)D2 SERPL-MCNC: 42.7 NG/ML (ref 30–100)
TSH SERPL DL<=0.005 MIU/L-ACNC: 0.51 UIU/ML (ref 0.27–4.2)
VIT B12 SERPL-MCNC: 516 PG/ML (ref 211–946)

## 2021-07-14 NOTE — PROGRESS NOTES
Subjective   Mendy Woods is a 71 y.o. female.     New pt ref by Felicia REED for dm 2/ testing bs using the freestyle sebas 4-15 x day  / last dm eye exam July 2021 with dr Resendiz  / last dm foot exam today with dr Drake      Patient is a 71-year-old female who came in for diabetes control.    She has known diabetes mellitus since 2000 and started on insulin in 2019.  She is on Lantus 40 units every evening, Victoza 1.8 mg once a day, and Metformin  mg 2 tablets twice a day.  She has no significant weight change since April 2021.  She does not exercise regularly.    She has a freestyle sebas sensor in place.  Average glucose 260 mg per DL.  Time in target 41%.  Time above target 56%.  Time below target 3%.  She has intermittent hypoglycemia before breakfast.  She has postprandial hyperglycemia mostly at supper.    Her last eye examination was in July 2021.  She denies numbness, tingling or burning in her hands or feet.  She has chronic kidney disease and was seen by nephrologist in the past.  Her serum creatinine has ranged from 1.40-1.76 since November 2020.  eGFR has ranged from 28-37.    She has hyperlipidemia and is on atorvastatin 80 mg/day.  She denies myalgia.    She has hypothyroidism and is on Synthroid 137 mcg/day.  She has no history of goiter or head/neck radiation therapy.  TSH done in June 2021 is normal at 0.514.    She has hypertension and is on Toprol-XL 25 mg once a day.  She has no history of heart attack or stroke.  She denies chest pain.    She has history of vitamin B12 deficiency and is on vitamin B12 1200 mg sublingual daily.  She has been on ferrous sulfate 325 mg twice a day for more than 10 years.  She has history of vitamin D deficiency and is on vitamin D supplements.  She has intermittent diarrhea.  She denies melena or hematochezia.  25 hydroxy vitamin D done in June 2021 is normal at 42.7 ng/mL.  Vitamin B-12 levels done in June 2021 is normal at 516  "pg/mL.      The following portions of the patient's history were reviewed and updated as appropriate: allergies, current medications, past family history, past medical history, past social history, past surgical history and problem list.    Review of Systems   Respiratory: Negative for shortness of breath.    Cardiovascular: Negative for chest pain and palpitations.   Gastrointestinal: Positive for diarrhea.   Endocrine: Negative.    Genitourinary: Negative.    Musculoskeletal: Negative for myalgias.   Neurological: Negative for numbness.     Objective      Vitals:    07/21/21 1225   BP: 126/82   BP Location: Right arm   Patient Position: Sitting   Cuff Size: Large Adult   Pulse: 76   SpO2: 98%   Weight: 89.7 kg (197 lb 12.8 oz)   Height: 154.9 cm (60.98\")     Physical Exam  Constitutional:       General: She is not in acute distress.     Appearance: Normal appearance. She is obese. She is not ill-appearing, toxic-appearing or diaphoretic.   Eyes:      General: No scleral icterus.        Right eye: No discharge.         Left eye: No discharge.   Cardiovascular:      Rate and Rhythm: Normal rate and regular rhythm.      Pulses: Normal pulses.      Heart sounds: Normal heart sounds.   Pulmonary:      Effort: Pulmonary effort is normal.      Breath sounds: Normal breath sounds.   Abdominal:      General: There is no distension.      Palpations: Abdomen is soft. There is no mass.      Tenderness: There is no right CVA tenderness or left CVA tenderness.   Musculoskeletal:         General: No swelling or tenderness. Normal range of motion.      Cervical back: Normal range of motion and neck supple.      Comments: No plantar ulcers   Skin:     Comments: Right flaky skin on right distal foot   Neurological:      General: No focal deficit present.      Mental Status: She is alert and oriented to person, place, and time.      Comments: Intact light touch in lower extremities       Office Visit on 06/16/2021   Component Date " Value Ref Range Status   • 25 Hydroxy, Vitamin D 06/16/2021 42.7  30.0 - 100.0 ng/ml Final    Comment: Results may be falsely increased if patient taking Biotin.  Reference Range for Total Vitamin D 25(OH)  Deficiency <20.0 ng/mL  Insufficiency 21-29 ng/mL  Sufficiency  ng/mL  Toxicity >100 ng/ml     • Vitamin B-12 06/16/2021 516  211 - 946 pg/mL Final    Results may be falsely increased if patient taking Biotin.   • TSH 06/16/2021 0.514  0.270 - 4.200 uIU/mL Final     Assessment/Plan   Diagnoses and all orders for this visit:    1. Type 2 diabetes mellitus with nephropathy (CMS/HCC) (Primary)  -     Comprehensive Metabolic Panel  -     Lipid Panel  -     Hemoglobin A1c  -     TSH  -     T4, Free  -     C-Peptide  -     Glutamic Acid Decarboxylase  -     Microalbumin / Creatinine Urine Ratio - Urine, Clean Catch    2. Essential hypertension    3. Hyperlipidemia, unspecified hyperlipidemia type  -     Lipid Panel  -     TSH  -     T4, Free    4. Acquired hypothyroidism  -     TSH  -     T4, Free    5. B12 deficiency  -     Celiac Disease Panel  -     Vitamin B12  -     Iron Profile  -     Intrinsic Factor Ab  -     Ferritin    6. Vitamin D deficiency  -     Celiac Disease Panel    7. Chronic kidney disease, unspecified CKD stage   -     Iron Profile  -     Ferritin    Other orders  -     Insulin Glargine (LANTUS SOLOSTAR) 100 UNIT/ML injection pen; 36 units daily  Dispense: 40 mL; Refill: 3      Decrease Lantus to 36 units every evening.  Continue Victoza.  Continue Metformin ER.  Watch serum creatinine closely.  Continue Lipitor 80 mg/day.  Continue Synthroid 137 mcg/day.  Continue vitamin D, vitamin B12, and iron supplements per PCP.  Check celiac panel due to multiple vitamin and nutrient deficiencies    Copy of my note sent to Felicia Duffy NP.    RTC 4 mos.    Total time 72 minutes.

## 2021-07-21 ENCOUNTER — OFFICE VISIT (OUTPATIENT)
Dept: ENDOCRINOLOGY | Age: 71
End: 2021-07-21

## 2021-07-21 VITALS
BODY MASS INDEX: 37.34 KG/M2 | OXYGEN SATURATION: 98 % | HEIGHT: 61 IN | DIASTOLIC BLOOD PRESSURE: 82 MMHG | WEIGHT: 197.8 LBS | SYSTOLIC BLOOD PRESSURE: 126 MMHG | HEART RATE: 76 BPM

## 2021-07-21 DIAGNOSIS — E03.9 ACQUIRED HYPOTHYROIDISM: ICD-10-CM

## 2021-07-21 DIAGNOSIS — E11.21 TYPE 2 DIABETES MELLITUS WITH NEPHROPATHY (HCC): Primary | ICD-10-CM

## 2021-07-21 DIAGNOSIS — E55.9 VITAMIN D DEFICIENCY: ICD-10-CM

## 2021-07-21 DIAGNOSIS — I10 ESSENTIAL HYPERTENSION: ICD-10-CM

## 2021-07-21 DIAGNOSIS — E78.5 HYPERLIPIDEMIA, UNSPECIFIED HYPERLIPIDEMIA TYPE: ICD-10-CM

## 2021-07-21 DIAGNOSIS — E53.8 B12 DEFICIENCY: ICD-10-CM

## 2021-07-21 DIAGNOSIS — N18.9 CHRONIC KIDNEY DISEASE, UNSPECIFIED CKD STAGE: ICD-10-CM

## 2021-07-21 PROCEDURE — 99215 OFFICE O/P EST HI 40 MIN: CPT | Performed by: INTERNAL MEDICINE

## 2021-07-21 RX ORDER — FERROUS SULFATE TAB EC 324 MG (65 MG FE EQUIVALENT) 324 (65 FE) MG
324 TABLET DELAYED RESPONSE ORAL 2 TIMES DAILY WITH MEALS
COMMUNITY

## 2021-07-21 RX ORDER — LIRAGLUTIDE 6 MG/ML
1.8 INJECTION SUBCUTANEOUS DAILY
Qty: 27 ML | Refills: 1 | Status: SHIPPED | OUTPATIENT
Start: 2021-07-21 | End: 2021-10-15

## 2021-07-29 LAB
ALBUMIN SERPL-MCNC: 4.4 G/DL (ref 3.5–5.2)
ALBUMIN/CREAT UR: 1403 MG/G CREAT (ref 0–29)
ALBUMIN/GLOB SERPL: 1.3 G/DL
ALP SERPL-CCNC: 102 U/L (ref 39–117)
ALT SERPL-CCNC: 12 U/L (ref 1–33)
AST SERPL-CCNC: 15 U/L (ref 1–32)
BILIRUB SERPL-MCNC: 0.5 MG/DL (ref 0–1.2)
BUN SERPL-MCNC: 16 MG/DL (ref 8–23)
BUN/CREAT SERPL: 9.5 (ref 7–25)
C PEPTIDE SERPL-MCNC: <0.1 NG/ML (ref 1.1–4.4)
CALCIUM SERPL-MCNC: 9.9 MG/DL (ref 8.6–10.5)
CHLORIDE SERPL-SCNC: 101 MMOL/L (ref 98–107)
CHOLEST SERPL-MCNC: 166 MG/DL (ref 0–200)
CO2 SERPL-SCNC: 22.9 MMOL/L (ref 22–29)
CREAT SERPL-MCNC: 1.68 MG/DL (ref 0.57–1)
CREAT UR-MCNC: 38 MG/DL
ENDOMYSIUM IGA SER QL: NEGATIVE
FERRITIN SERPL-MCNC: 238 NG/ML (ref 13–150)
GAD65 AB SER IA-ACNC: ABNORMAL U/ML (ref 0–5)
GLOBULIN SER CALC-MCNC: 3.3 GM/DL
GLUCOSE SERPL-MCNC: 69 MG/DL (ref 65–99)
HBA1C MFR BLD: 9.4 % (ref 4.8–5.6)
HDLC SERPL-MCNC: 47 MG/DL (ref 40–60)
IF BLOCK AB SER-ACNC: 154.5 AU/ML (ref 0–1.1)
IGA SERPL-MCNC: <5 MG/DL (ref 64–422)
IMP & REVIEW OF LAB RESULTS: NORMAL
IRON SATN MFR SERPL: 17 % (ref 20–50)
IRON SERPL-MCNC: 49 MCG/DL (ref 37–145)
LDLC SERPL CALC-MCNC: 94 MG/DL (ref 0–100)
MICROALBUMIN UR-MCNC: 533 UG/ML
POTASSIUM SERPL-SCNC: 3.7 MMOL/L (ref 3.5–5.2)
PROT SERPL-MCNC: 7.7 G/DL (ref 6–8.5)
REPORT: NORMAL
SODIUM SERPL-SCNC: 140 MMOL/L (ref 136–145)
T4 FREE SERPL-MCNC: 1.62 NG/DL (ref 0.93–1.7)
TIBC SERPL-MCNC: 294 MCG/DL
TRIGL SERPL-MCNC: 145 MG/DL (ref 0–150)
TSH SERPL DL<=0.005 MIU/L-ACNC: 0.85 UIU/ML (ref 0.27–4.2)
TTG IGA SER-ACNC: <2 U/ML (ref 0–3)
TTG IGG SER-ACNC: 3 U/ML (ref 0–5)
UIBC SERPL-MCNC: 245 MCG/DL (ref 112–346)
VIT B12 SERPL-MCNC: 643 PG/ML (ref 211–946)
VLDLC SERPL CALC-MCNC: 25 MG/DL (ref 5–40)

## 2021-07-31 PROBLEM — D51.0 PERNICIOUS ANEMIA: Status: ACTIVE | Noted: 2021-07-31

## 2021-07-31 PROBLEM — E13.9 LATENT AUTOIMMUNE DIABETES MELLITUS IN ADULTS (LADA): Status: ACTIVE | Noted: 2021-07-31

## 2021-07-31 RX ORDER — INSULIN LISPRO 100 [IU]/ML
INJECTION, SOLUTION INTRAVENOUS; SUBCUTANEOUS
Qty: 10 PEN | Refills: 1 | Status: SHIPPED | OUTPATIENT
Start: 2021-07-31 | End: 2022-03-31 | Stop reason: SDUPTHER

## 2021-07-31 NOTE — PROGRESS NOTES
Stable mild elevation of serum creatinine. eGFR 30. Urine microalbumin elevated. Suggest starting on ACE inhibitor/ARB. Suggest follow-up with nephrologist.LDL 94. HDL 47. Continue Lipitor 80 mg/day and low-fat diet.Diabetes not well controlled. C-peptide undetectable.  BRENDA antibody elevated. 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  Start Humalog 7 units with each meal.  Prescription sent to pharmacist.Normal thyroid function tests. Continue Synthroid 137 mcg/day.  Celiac antibody negative.  Normal vitamin B12. Intrinsic factor antibody elevated. Patient has pernicious anemia. Continue vitamin B12 1200 mg sublingual daily.Normal iron. Ferritin 238. Continue ferrous sulfate 325 mg twice daily.Copy of labs sent to Felicia Duffy NP and to patient through my chart.Please notify patient of results and instructions

## 2021-08-03 RX ORDER — DOXYCYCLINE HYCLATE 50 MG/1
CAPSULE ORAL
Qty: 60 CAPSULE | Refills: 1 | OUTPATIENT
Start: 2021-08-03

## 2021-08-03 NOTE — TELEPHONE ENCOUNTER
Can you see how patients face (rosacea) is doing? Would like to stop doxycycline and use topical at this point if doing well.

## 2021-08-12 RX ORDER — LEVOTHYROXINE SODIUM 137 MCG
TABLET ORAL
Qty: 90 TABLET | Refills: 1 | OUTPATIENT
Start: 2021-08-12

## 2021-08-13 RX ORDER — LEVOTHYROXINE SODIUM 137 MCG
TABLET ORAL
Qty: 90 TABLET | Refills: 1 | Status: SHIPPED | OUTPATIENT
Start: 2021-08-13 | End: 2022-03-31 | Stop reason: SDUPTHER

## 2021-08-17 ENCOUNTER — TELEPHONE (OUTPATIENT)
Dept: ENDOCRINOLOGY | Age: 71
End: 2021-08-17

## 2021-08-18 ENCOUNTER — OFFICE VISIT (OUTPATIENT)
Dept: FAMILY MEDICINE CLINIC | Facility: CLINIC | Age: 71
End: 2021-08-18

## 2021-08-18 DIAGNOSIS — E11.649 TYPE 2 DIABETES MELLITUS WITH HYPOGLYCEMIA WITHOUT COMA, WITH LONG-TERM CURRENT USE OF INSULIN (HCC): Primary | ICD-10-CM

## 2021-08-18 DIAGNOSIS — Z79.4 TYPE 2 DIABETES MELLITUS WITH HYPOGLYCEMIA WITHOUT COMA, WITH LONG-TERM CURRENT USE OF INSULIN (HCC): Primary | ICD-10-CM

## 2021-08-18 DIAGNOSIS — I10 ESSENTIAL HYPERTENSION: ICD-10-CM

## 2021-08-18 PROCEDURE — 99214 OFFICE O/P EST MOD 30 MIN: CPT | Performed by: NURSE PRACTITIONER

## 2021-08-18 NOTE — PROGRESS NOTES
"Chief Complaint  No chief complaint on file.    Subjective          Mendy Woods presents to [facility]  Mendy presents for follow up hypertension. She has a history of diabetes. She was given short acting insulin as well as long acting. She was given a sliding scale. She stopped tresiba, now taking lantus. Blood sugar was dropping at night, would get up and eat a hard boiled egg or toast    Recently evaluated by Dr. Drake. She is being treated with long and short acting insulin. Reports low sugars at times, but rebounds with snack. She goes to bed around 6:30 pm and reports sugars are dropping around 3 am.    BP is borderline. She is taking her medication and reports improvement, checking at home and much improved from office readings.           Objective   Vital Signs:   Pulse 96   Temp 96.4 °F (35.8 °C) (Temporal)   Resp 18   Ht 154.9 cm (61\")   Wt 91.8 kg (202 lb 4.8 oz)   SpO2 99%   BMI 38.22 kg/m²     Physical Exam  Vitals reviewed.   Constitutional:       Appearance: Normal appearance.   Cardiovascular:      Rate and Rhythm: Normal rate and regular rhythm.      Heart sounds: No murmur heard.   No friction rub. No gallop.    Pulmonary:      Effort: Pulmonary effort is normal. No respiratory distress.      Breath sounds: Normal breath sounds. No wheezing, rhonchi or rales.   Skin:     General: Skin is warm and dry.   Neurological:      Mental Status: She is alert and oriented to person, place, and time.   Psychiatric:         Mood and Affect: Mood normal.        Result Review :     Common labs    Common Labsle 4/20/21 4/20/21 4/20/21 6/8/21 6/8/21 7/21/21 7/21/21 7/21/21 7/21/21    0903 0903 0903 1125 1125 1427 1427 1427 1427   Glucose     207 (A)       Glucose  127 (A)    69      BUN  21   21 16      Creatinine  1.40 (A)   1.80 (A) 1.68 (A)      eGFR Non  Am  37 (A)   28 (A) 30 (A)      eGFR  Am  45 (A)    36 (A)      Sodium  138   139 140      Potassium  " 4.3   4.1 3.7      Chloride  102   103 101      Calcium  10.0   9.9 9.9      Total Protein  7.0    7.7      Albumin  4.20    4.40      Total Bilirubin  0.4    0.5      Alkaline Phosphatase  97    102      AST (SGOT)  11    15      ALT (SGPT)  13    12      WBC    11.52 (A)        Hemoglobin    13.3        Hematocrit    40.5        Platelets    286        Total Cholesterol   169    166     Triglycerides   146    145     HDL Cholesterol   51    47     LDL Cholesterol    93    94     Hemoglobin A1C 9.50 (A)       9.40 (A)    Microalbumin, Urine         533.0   (A) Abnormal value       Comments are available for some flowsheets but are not being displayed.                     Assessment and Plan    Diagnoses and all orders for this visit:    1. Type 2 diabetes mellitus with hypoglycemia without coma, with long-term current use of insulin (CMS/[facility]) (Primary)    2. Essential hypertension      I spent 45 minutes caring for Mendy on this date of service. This time includes time spent by me in the following activities:reviewing tests, performing a medically appropriate examination and/or evaluation , counseling and educating the patient/family/caregiver, ordering medications, tests, or procedures and documenting information in the medical record  Follow Up   No follow-ups on file.  Patient was given instructions and counseling regarding her condition or for health maintenance advice. Please see specific information pulled into the AVS if appropriate.     BP elevated in office, this occurs from time to time, however BP well controlled on 07/26  She is monitoring this at home and reports lower bp than in office  She is seeing endocrinology, A1C is improving  BP fluctuates  She was recommended to see diabetic nurse for nutritional assistance, but she has not heard from them, will discuss with endocrinology  Also recommended to follow up with nephrology, she is hesitant to do so, discussed disease process regarding diabetes and  kidneys, her kidney function is already declined and her medications place her at higher risk. She verbalized understanding but still reluctant to proceed. Prefers to have her labs drawn with endocrinology vs primary.  Recent labs reviewed with patient at time of visit.

## 2021-08-19 VITALS
HEIGHT: 61 IN | RESPIRATION RATE: 18 BRPM | SYSTOLIC BLOOD PRESSURE: 154 MMHG | DIASTOLIC BLOOD PRESSURE: 110 MMHG | BODY MASS INDEX: 38.19 KG/M2 | OXYGEN SATURATION: 99 % | WEIGHT: 202.3 LBS | HEART RATE: 96 BPM | TEMPERATURE: 96.4 F

## 2021-08-19 RX ORDER — METOPROLOL SUCCINATE 25 MG/1
TABLET, EXTENDED RELEASE ORAL
Qty: 30 TABLET | Refills: 1 | Status: SHIPPED | OUTPATIENT
Start: 2021-08-19 | End: 2021-09-14

## 2021-08-19 NOTE — TELEPHONE ENCOUNTER
Rx Refill Note  Requested Prescriptions     Pending Prescriptions Disp Refills   • metoprolol succinate XL (TOPROL-XL) 25 MG 24 hr tablet [Pharmacy Med Name: METOPROLOL SUCC ER 25 MG TAB] 30 tablet 1     Sig: TAKE 1 TABLET BY MOUTH EVERY DAY      Last office visit with prescribing clinician: 8/18/2021      Next office visit with prescribing clinician: 2/18/2022            Selvin Muller MA  08/19/21, 17:31 EDT

## 2021-09-14 RX ORDER — METOPROLOL SUCCINATE 25 MG/1
TABLET, EXTENDED RELEASE ORAL
Qty: 30 TABLET | Refills: 1 | Status: SHIPPED | OUTPATIENT
Start: 2021-09-14 | End: 2021-10-19

## 2021-09-29 ENCOUNTER — CLINICAL SUPPORT (OUTPATIENT)
Dept: FAMILY MEDICINE CLINIC | Facility: CLINIC | Age: 71
End: 2021-09-29

## 2021-09-29 DIAGNOSIS — Z23 NEED FOR VACCINATION: Primary | ICD-10-CM

## 2021-09-29 PROCEDURE — 90662 IIV NO PRSV INCREASED AG IM: CPT | Performed by: NURSE PRACTITIONER

## 2021-09-29 PROCEDURE — G0008 ADMIN INFLUENZA VIRUS VAC: HCPCS | Performed by: NURSE PRACTITIONER

## 2021-10-01 ENCOUNTER — OFFICE VISIT (OUTPATIENT)
Dept: FAMILY MEDICINE CLINIC | Facility: CLINIC | Age: 71
End: 2021-10-01

## 2021-10-01 VITALS
HEART RATE: 84 BPM | TEMPERATURE: 95 F | WEIGHT: 200.9 LBS | DIASTOLIC BLOOD PRESSURE: 100 MMHG | OXYGEN SATURATION: 99 % | SYSTOLIC BLOOD PRESSURE: 152 MMHG | RESPIRATION RATE: 18 BRPM | HEIGHT: 61 IN | BODY MASS INDEX: 37.93 KG/M2

## 2021-10-01 DIAGNOSIS — E11.649 TYPE 2 DIABETES MELLITUS WITH HYPOGLYCEMIA WITHOUT COMA, WITH LONG-TERM CURRENT USE OF INSULIN (HCC): Primary | ICD-10-CM

## 2021-10-01 DIAGNOSIS — Z79.4 TYPE 2 DIABETES MELLITUS WITH HYPOGLYCEMIA WITHOUT COMA, WITH LONG-TERM CURRENT USE OF INSULIN (HCC): Primary | ICD-10-CM

## 2021-10-01 DIAGNOSIS — I10 ESSENTIAL HYPERTENSION: ICD-10-CM

## 2021-10-01 PROCEDURE — 99214 OFFICE O/P EST MOD 30 MIN: CPT | Performed by: NURSE PRACTITIONER

## 2021-10-01 NOTE — PROGRESS NOTES
"Chief Complaint  GI Problem (wants to talk about Endo)    Subjective          Mendy Woods presents to [facility]  Mendy presents with concerns regarding her diabetes. She states she is having trouble losing weight despite not eating as much and her glucose is varied. She established with endocrinology, but has concerns about her treatment plan. She was started on short acting insulin but states she does not know when to take this.     Glucose 270 last evening, woke to 112,   Ate a piece of toast with butter for breakfast, glucose 160, has not taken fast acting this morning  Off invokana and farxiga for past year due to decline in kidney function    BP elevated, takes metoprolol at lunch time  Long acting insulin:   Short acting insulin        Objective   Vital Signs:   /100   Pulse 84   Temp 95 °F (35 °C) (Infrared)   Resp 18   Ht 154.9 cm (61\")   Wt 91.1 kg (200 lb 14.4 oz)   SpO2 99%   BMI 37.96 kg/m²     Physical Exam  Vitals reviewed.   Constitutional:       Appearance: Normal appearance.   Cardiovascular:      Rate and Rhythm: Normal rate and regular rhythm.      Pulses: Normal pulses.      Heart sounds: Normal heart sounds.   Pulmonary:      Effort: Pulmonary effort is normal. No respiratory distress.      Breath sounds: Normal breath sounds. No wheezing, rhonchi or rales.   Skin:     General: Skin is warm and dry.   Neurological:      Mental Status: She is alert and oriented to person, place, and time.   Psychiatric:         Mood and Affect: Mood normal.        Result Review :                 Assessment and Plan    Diagnoses and all orders for this visit:    1. Type 2 diabetes mellitus with hypoglycemia without coma, with long-term current use of insulin ([facility]) (Primary)  -     Ambulatory Referral to Endocrinology  -     Ambulatory Referral to Diabetic Education      I spent 30 minutes caring for Mendy on this date of service. This time includes time spent by me " in the following activities:reviewing tests, performing a medically appropriate examination and/or evaluation , counseling and educating the patient/family/caregiver and documenting information in the medical record  Follow Up   No follow-ups on file.  Patient was given instructions and counseling regarding her condition or for health maintenance advice. Please see specific information pulled into the AVS if appropriate.     Diabetes: short acting, recommend taking prior to eating meal, three times daily. She states she thought she was informed to take it if her blood sugar was above a certain level, unrelated to meals. I do recommend that she follow up with Dr. Drake for clarification on medications.   She was referred to diabetic educator but has not heard anything, referral placed. She may need 1:1 education with nutritionist and we can consider that moving forward. She is concerned regarding her weight, advised insulin can cause weight gain.   HTN: blood pressure is increased, has not had medication today, would consider increase in metoprolol to 50 mg daily. She is to monitor BP daily and report back via [employer] in a few weeks. Heart rate is 84 and would tolerate dose increase.   Decreased kidney function: referral placed to nephrology, has not received information regarding an appointment.  Labs completed end of July, too soon to repeat labs today, recommend follow up in 4 months, sooner as needed.

## 2021-10-12 ENCOUNTER — TELEPHONE (OUTPATIENT)
Dept: FAMILY MEDICINE CLINIC | Facility: CLINIC | Age: 71
End: 2021-10-12

## 2021-10-12 NOTE — TELEPHONE ENCOUNTER
We will need to look at [facility] and see if they have any provider's accepting new patients for diabetes

## 2021-10-12 NOTE — TELEPHONE ENCOUNTER
Received a call from Dr Scott's office. They stated that they are unable to take her on as a new patient. There were two reasons, the first being they are not in network with her insurance, and second they said she is already established with another Endocrinologist.    I see she has an upcoming appointment with Dr Drake in February. Should she keep that or should we try and find her another Endo group?

## 2021-10-14 ENCOUNTER — HOSPITAL ENCOUNTER (OUTPATIENT)
Dept: DIABETES SERVICES | Facility: HOSPITAL | Age: 71
Discharge: HOME | End: 2021-10-14
Admitting: NURSE PRACTITIONER

## 2021-10-14 ENCOUNTER — TELEPHONE (OUTPATIENT)
Dept: FAMILY MEDICINE CLINIC | Facility: CLINIC | Age: 71
End: 2021-10-14

## 2021-10-14 PROCEDURE — 97802 MEDICAL NUTRITION INDIV IN: CPT

## 2021-10-14 NOTE — TELEPHONE ENCOUNTER
Spoke with pt and she is asking to see if we can find another Endocrinologist. She feels like he is not the right fit for her. She went to see the diabetes education. Shes never been on non fasting insulin in her time with diabetes. She would like for you to manage her diabetes until she gets a new Endocrinologist. She wants clarification on Metformin.

## 2021-10-14 NOTE — TELEPHONE ENCOUNTER
HAD REALLY BAD HYPO DURING HER DIABETES EDUCATION OUTPATIENT APPT,[facility] HAVE SOME CONCERNS...PLEASE CALL BRIAN [phone]

## 2021-10-15 ENCOUNTER — OFFICE VISIT (OUTPATIENT)
Dept: FAMILY MEDICINE CLINIC | Facility: CLINIC | Age: 71
End: 2021-10-15

## 2021-10-15 VITALS
DIASTOLIC BLOOD PRESSURE: 98 MMHG | OXYGEN SATURATION: 98 % | SYSTOLIC BLOOD PRESSURE: 160 MMHG | TEMPERATURE: 95.7 F | HEART RATE: 78 BPM | RESPIRATION RATE: 18 BRPM | BODY MASS INDEX: 39.33 KG/M2 | WEIGHT: 208.3 LBS | HEIGHT: 61 IN

## 2021-10-15 DIAGNOSIS — N18.4 TYPE 1 DIABETES MELLITUS WITH STAGE 4 CHRONIC KIDNEY DISEASE (HCC): Primary | ICD-10-CM

## 2021-10-15 DIAGNOSIS — E10.22 TYPE 1 DIABETES MELLITUS WITH STAGE 4 CHRONIC KIDNEY DISEASE (HCC): Primary | ICD-10-CM

## 2021-10-15 PROCEDURE — 99214 OFFICE O/P EST MOD 30 MIN: CPT | Performed by: NURSE PRACTITIONER

## 2021-10-15 NOTE — PATIENT INSTRUCTIONS
Discontinue victoza  Discontinue metformin    Decrease humalog to 5 units 15 minutes before eating, three times daily  Continue levemir 36 units daily    Monitor glucose three times daily before meals, report sugars on Tuesday.

## 2021-10-16 LAB
BUN SERPL-MCNC: 16 MG/DL (ref 8–23)
BUN/CREAT SERPL: 9.4 (ref 7–25)
CALCIUM SERPL-MCNC: 9.3 MG/DL (ref 8.6–10.5)
CHLORIDE SERPL-SCNC: 105 MMOL/L (ref 98–107)
CO2 SERPL-SCNC: 26.1 MMOL/L (ref 22–29)
CREAT SERPL-MCNC: 1.7 MG/DL (ref 0.57–1)
GLUCOSE SERPL-MCNC: 215 MG/DL (ref 65–99)
HBA1C MFR BLD: 8.2 % (ref 4.8–5.6)
POTASSIUM SERPL-SCNC: 4.9 MMOL/L (ref 3.5–5.2)
SODIUM SERPL-SCNC: 139 MMOL/L (ref 136–145)

## 2021-10-17 DIAGNOSIS — E11.29 TYPE 2 DIABETES MELLITUS WITH OTHER DIABETIC KIDNEY COMPLICATION, WITH LONG-TERM CURRENT USE OF INSULIN (HCC): ICD-10-CM

## 2021-10-17 DIAGNOSIS — Z79.4 TYPE 2 DIABETES MELLITUS WITH OTHER DIABETIC KIDNEY COMPLICATION, WITH LONG-TERM CURRENT USE OF INSULIN (HCC): ICD-10-CM

## 2021-10-18 ENCOUNTER — TELEPHONE (OUTPATIENT)
Dept: FAMILY MEDICINE CLINIC | Facility: CLINIC | Age: 71
End: 2021-10-18

## 2021-10-18 RX ORDER — FLASH GLUCOSE SENSOR
KIT MISCELLANEOUS
Refills: 3 | OUTPATIENT
Start: 2021-10-18

## 2021-10-18 NOTE — TELEPHONE ENCOUNTER
Caller: Mendy Woods     Relationship: PATIENT    Best call back number: [phone] (H)    What is your medical concern? CONCERNED ABOUT HER FAST AND LONG ACTING INSULIN. HER NUMBERS ARE GOING TOO HIGH.    Is your provider already aware of this issue? NO    Have you been treated for this issue? YES    PATIENT WOULD LIKE A CALL BACK FROM CLINICAL TEAM ON WHAT TO DO

## 2021-10-19 RX ORDER — METOPROLOL SUCCINATE 25 MG/1
TABLET, EXTENDED RELEASE ORAL
Qty: 30 TABLET | Refills: 1 | Status: SHIPPED | OUTPATIENT
Start: 2021-10-19 | End: 2021-11-10 | Stop reason: DRUGHIGH

## 2021-10-19 NOTE — TELEPHONE ENCOUNTER
Rx Refill Note  Requested Prescriptions     Pending Prescriptions Disp Refills   • metoprolol succinate XL (TOPROL-XL) 25 MG 24 hr tablet [Pharmacy Med Name: METOPROLOL SUCC ER 25 MG TAB] 30 tablet 1     Sig: TAKE 1 TABLET BY MOUTH EVERY DAY      Last office visit with prescribing clinician: 10/15/2021      Next office visit with prescribing clinician: 2/18/2022            Selvin Muller MA  10/19/21, 17:56 EDT

## 2021-10-19 NOTE — TELEPHONE ENCOUNTER
Spoke with pt and she is not taking the victoza in the mornings she is taking the lantis instead and according to her her numbers are running good. She is taking the fast acting insulin at least 15mins before meals. She states that she is feeling a whole lot better. Her suger at night are not droppig at night. She will call this afternoon with her reading since she is out of the house.

## 2021-10-23 NOTE — PROGRESS NOTES
"Chief Complaint  Diabetes    Subjective          Mendy Woods presents to [facility]  Mendy is latent type 1 diabetes. She is having hypoglycemic episodes, completed diabetic education last evening and her glucose dropped by 100 points while in the office and she was symptomatic. She is currently prescribed lantus, humalog, metformin and victoza. Her sugar drops as low as 40s at night, and more frequently, and goes very high at times as well. She states she was confused regarding the humalog and when to take this.     Diabetes  She presents for her follow-up diabetic visit. She has type 1 diabetes mellitus. Her disease course has been fluctuating. Hypoglycemia symptoms include dizziness, mood changes, nervousness/anxiousness, pallor and sweats. Associated symptoms include blurred vision, fatigue and weakness. Hypoglycemia complications include nocturnal hypoglycemia. Symptoms are stable. Risk factors for coronary artery disease include diabetes mellitus, hypertension and obesity. Current diabetic treatment includes insulin injections and oral agent (dual therapy).       Objective   Vital Signs:   /98   Pulse 78   Temp 95.7 °F (35.4 °C) (Infrared)   Resp 18   Ht 154.9 cm (61\")   Wt 94.5 kg (208 lb 4.8 oz)   SpO2 98%   BMI 39.36 kg/m²     Physical Exam  Vitals reviewed.   Constitutional:       General: She is not in acute distress.     Appearance: She is well-developed. She is not diaphoretic.   Cardiovascular:      Rate and Rhythm: Normal rate and regular rhythm.      Heart sounds: Normal heart sounds. No murmur heard.  No friction rub. No gallop.    Pulmonary:      Effort: Pulmonary effort is normal. No respiratory distress.      Breath sounds: Normal breath sounds. No wheezing or rales.   Abdominal:      General: Bowel sounds are normal. There is no distension.      Palpations: Abdomen is soft.      Tenderness: There is no abdominal tenderness.   Musculoskeletal:     "  Cervical back: Neck supple.   Skin:     General: Skin is warm and dry.   Neurological:      Mental Status: She is alert and oriented to person, place, and time.        Result Review :                 Assessment and Plan    Diagnoses and all orders for this visit:    1. Type 1 diabetes mellitus with stage 4 chronic kidney disease (HCC) (Primary)  -     Hemoglobin A1c; Future  -     Basic metabolic panel; Future  -     Basic metabolic panel  -     Hemoglobin A1c      I spent 45 minutes caring for Mendy on this date of service. This time includes time spent by me in the following activities:preparing for the visit, reviewing tests, performing a medically appropriate examination and/or evaluation , counseling and educating the patient/family/caregiver, ordering medications, tests, or procedures, referring and communicating with other health care professionals  and documenting information in the medical record  Follow Up   Return in about 1 week (around 10/22/2021).  Patient was given instructions and counseling regarding her condition or for health maintenance advice. Please see specific information pulled into the AVS if appropriate.     Discussed plan with Mendy  Recommend d/c victoza and metformin  Continue lantus 36 units and humalog 7 units 15 minutes before meal, she has been instructed she needs to eat adequate meal or hypoglycemia will occur, she verbalized understanding. Believe metformin with victoza is contributing to hypoglycemia, she has been instructed to monitor glucose closely over the weekend and notify our office on Tuesday her readings including highs and lows. We will increase lantus and sliding scale as needed. Referral has been placed to endocrinology at her request.    Addendum: patient reported glucose on 10/22, over the first weekend, she held her lantus for unknown reason and her glucose did increase, however once she started this every morning with sliding scale, her glucose was improved, no  hypoglycemic episodes initially, but over past few days did report hypoglycemia, she has been instructed to take humalog with full meal and we will monitor and adjust as needed. May tolerate increase of lantus and decrease of humalog if she is not eating full meals.

## 2021-10-29 DIAGNOSIS — Z79.4 TYPE 2 DIABETES MELLITUS WITH OTHER DIABETIC KIDNEY COMPLICATION, WITH LONG-TERM CURRENT USE OF INSULIN (HCC): ICD-10-CM

## 2021-10-29 DIAGNOSIS — E11.29 TYPE 2 DIABETES MELLITUS WITH OTHER DIABETIC KIDNEY COMPLICATION, WITH LONG-TERM CURRENT USE OF INSULIN (HCC): ICD-10-CM

## 2021-10-29 RX ORDER — FLASH GLUCOSE SENSOR
KIT MISCELLANEOUS
Qty: 2 EACH | Refills: 3 | Status: SHIPPED | OUTPATIENT
Start: 2021-10-29 | End: 2022-02-01

## 2021-11-10 ENCOUNTER — TELEPHONE (OUTPATIENT)
Dept: FAMILY MEDICINE CLINIC | Facility: CLINIC | Age: 71
End: 2021-11-10

## 2021-11-10 ENCOUNTER — CLINICAL SUPPORT (OUTPATIENT)
Dept: FAMILY MEDICINE CLINIC | Facility: CLINIC | Age: 71
End: 2021-11-10

## 2021-11-10 VITALS — SYSTOLIC BLOOD PRESSURE: 202 MMHG | DIASTOLIC BLOOD PRESSURE: 117 MMHG

## 2021-11-10 DIAGNOSIS — Z01.30 BP CHECK: ICD-10-CM

## 2021-11-10 RX ORDER — CLONIDINE HYDROCHLORIDE 0.1 MG/1
0.1 TABLET ORAL 2 TIMES DAILY PRN
Qty: 30 TABLET | Refills: 0 | Status: SHIPPED | OUTPATIENT
Start: 2021-11-10 | End: 2021-12-13 | Stop reason: SDUPTHER

## 2021-11-10 RX ORDER — METOPROLOL SUCCINATE 50 MG/1
50 TABLET, EXTENDED RELEASE ORAL DAILY
Qty: 30 TABLET | Refills: 0 | Status: SHIPPED | OUTPATIENT
Start: 2021-11-10 | End: 2021-11-18 | Stop reason: SDUPTHER

## 2021-11-10 NOTE — TELEPHONE ENCOUNTER
PT TRX FROM [facility] ENCOURAGED PT TO GO TO THE ER WITH B/P BEING 200/91, SPOKE WITH PT AS WELL TO GO TO ER-PT AGREED WITH B/P BEING SO HIGH,PT WENT TO URGENT CARE INSTEAD AND URGENT INSTRUCTED PT TO COME UP TO THE OFFICE WITHOUT SEEING THE PT WITH A B/P /158.

## 2021-11-15 DIAGNOSIS — E78.00 PURE HYPERCHOLESTEROLEMIA: ICD-10-CM

## 2021-11-15 RX ORDER — ATORVASTATIN CALCIUM 80 MG/1
TABLET, FILM COATED ORAL
Qty: 90 TABLET | Refills: 3 | Status: SHIPPED | OUTPATIENT
Start: 2021-11-15 | End: 2022-10-25

## 2021-11-15 RX ORDER — METOPROLOL SUCCINATE 25 MG/1
TABLET, EXTENDED RELEASE ORAL
Qty: 30 TABLET | Refills: 1 | OUTPATIENT
Start: 2021-11-15

## 2021-11-18 ENCOUNTER — OFFICE VISIT (OUTPATIENT)
Dept: FAMILY MEDICINE CLINIC | Facility: CLINIC | Age: 71
End: 2021-11-18

## 2021-11-18 VITALS
BODY MASS INDEX: 39.1 KG/M2 | HEART RATE: 65 BPM | OXYGEN SATURATION: 97 % | HEIGHT: 61 IN | WEIGHT: 207.1 LBS | RESPIRATION RATE: 16 BRPM | DIASTOLIC BLOOD PRESSURE: 96 MMHG | SYSTOLIC BLOOD PRESSURE: 160 MMHG | TEMPERATURE: 94.6 F

## 2021-11-18 DIAGNOSIS — N18.4 TYPE 1 DIABETES MELLITUS WITH STAGE 4 CHRONIC KIDNEY DISEASE (HCC): ICD-10-CM

## 2021-11-18 DIAGNOSIS — Z79.899 MEDICATION MANAGEMENT: Primary | ICD-10-CM

## 2021-11-18 DIAGNOSIS — E10.22 TYPE 1 DIABETES MELLITUS WITH STAGE 4 CHRONIC KIDNEY DISEASE (HCC): ICD-10-CM

## 2021-11-18 DIAGNOSIS — I10 PRIMARY HYPERTENSION: ICD-10-CM

## 2021-11-18 PROCEDURE — 99214 OFFICE O/P EST MOD 30 MIN: CPT | Performed by: NURSE PRACTITIONER

## 2021-11-18 RX ORDER — METOPROLOL SUCCINATE 50 MG/1
75 TABLET, EXTENDED RELEASE ORAL DAILY
Qty: 45 TABLET | Refills: 3 | Status: SHIPPED | OUTPATIENT
Start: 2021-11-18 | End: 2021-12-13

## 2021-11-18 NOTE — PROGRESS NOTES
"Chief Complaint  Hypertension (1wk Fu)    Subjective          Mendy Woods presents to [facility]  BP averaging , 166, this am, was elevated 170/90,  Took clonidine the first time today 144/88 after an hour.    lantus taking in am, previously taking at night. Takes about one hour after eating breakfast  No further hypoglycemic episodes, not waking up at night with sweats  Taking humalog about 15-20 minutes before eating      Hypertension  This is a chronic problem. The current episode started more than 1 year ago. The problem has been waxing and waning since onset. The problem is uncontrolled. Pertinent negatives include no anxiety, blurred vision, chest pain, headaches, malaise/fatigue, neck pain, orthopnea, palpitations, peripheral edema, PND, shortness of breath or sweats. There are no associated agents to hypertension. Risk factors for coronary artery disease include diabetes mellitus, dyslipidemia and post-menopausal state. Past treatments include beta blockers, ACE inhibitors, diuretics and calcium channel blockers. Current antihypertension treatment includes beta blockers. The current treatment provides mild improvement.   Diabetes  She presents for her follow-up diabetic visit. She has type 1 diabetes mellitus. Her disease course has been improving. Pertinent negatives for hypoglycemia include no headaches or sweats. Pertinent negatives for diabetes include no blurred vision and no chest pain. There are no hypoglycemic complications. (None with medication changes  ) Diabetic complications include nephropathy. Risk factors for coronary artery disease include diabetes mellitus, dyslipidemia, hypertension, obesity and post-menopausal. Current diabetic treatment includes insulin injections. She is compliant with treatment most of the time.       Objective   Vital Signs:   /96   Pulse 65   Temp 94.6 °F (34.8 °C) (Infrared)   Resp 16   Ht 154.9 cm (61\")   Wt 93.9 kg " (207 lb 1.6 oz)   SpO2 97%   BMI 39.13 kg/m²     Physical Exam  Vitals reviewed.   Constitutional:       General: She is not in acute distress.     Appearance: Normal appearance. She is well-developed. She is not diaphoretic.   Cardiovascular:      Rate and Rhythm: Normal rate and regular rhythm.      Heart sounds: Normal heart sounds. No murmur heard.  No friction rub. No gallop.    Pulmonary:      Effort: Pulmonary effort is normal. No respiratory distress.      Breath sounds: Normal breath sounds. No wheezing or rales.   Abdominal:      General: Bowel sounds are normal. There is no distension.      Palpations: Abdomen is soft.      Tenderness: There is no abdominal tenderness.   Musculoskeletal:      Cervical back: Neck supple.   Skin:     General: Skin is warm and dry.   Neurological:      Mental Status: She is alert and oriented to person, place, and time.        Result Review :                 Assessment and Plan    Diagnoses and all orders for this visit:    1. Medication management (Primary)  -     Hemoglobin A1c  -     Comprehensive metabolic panel    2. Type 1 diabetes mellitus with stage 4 chronic kidney disease (HCC)  -     Hemoglobin A1c  -     Comprehensive metabolic panel    Other orders  -     metoprolol succinate XL (Toprol XL) 50 MG 24 hr tablet; Take 1.5 tablets by mouth Daily.  Dispense: 45 tablet; Refill: 3        Follow Up   No follow-ups on file.  Patient was given instructions and counseling regarding her condition or for health maintenance advice. Please see specific information pulled into the AVS if appropriate.     HTN: improving, used clonidine x 1 today  Increase metoprolol to 75 mg, patient is tolerating current dose, HR is in normal limits  Diabetes: repeat A1C and CMP, her sebas came off and she has not been checking recently  Will monitor and see where her sugars are, follow up in 3 months, denies reports of hypoglycemia,

## 2021-11-19 LAB
ALBUMIN SERPL-MCNC: 4.1 G/DL (ref 3.7–4.7)
ALBUMIN/GLOB SERPL: 1.3 {RATIO} (ref 1.2–2.2)
ALP SERPL-CCNC: 92 IU/L (ref 44–121)
ALT SERPL-CCNC: 10 IU/L (ref 0–32)
AST SERPL-CCNC: 15 IU/L (ref 0–40)
BILIRUB SERPL-MCNC: 0.4 MG/DL (ref 0–1.2)
BUN SERPL-MCNC: 24 MG/DL (ref 8–27)
BUN/CREAT SERPL: 13 (ref 12–28)
CALCIUM SERPL-MCNC: 9.5 MG/DL (ref 8.7–10.3)
CHLORIDE SERPL-SCNC: 105 MMOL/L (ref 96–106)
CO2 SERPL-SCNC: 18 MMOL/L (ref 20–29)
CREAT SERPL-MCNC: 1.92 MG/DL (ref 0.57–1)
GLOBULIN SER CALC-MCNC: 3.1 G/DL (ref 1.5–4.5)
GLUCOSE SERPL-MCNC: 246 MG/DL (ref 65–99)
HBA1C MFR BLD: 8.1 % (ref 4.8–5.6)
POTASSIUM SERPL-SCNC: 4.9 MMOL/L (ref 3.5–5.2)
PROT SERPL-MCNC: 7.2 G/DL (ref 6–8.5)
SODIUM SERPL-SCNC: 139 MMOL/L (ref 134–144)

## 2021-11-21 DIAGNOSIS — Z79.4 TYPE 2 DIABETES MELLITUS WITH CHRONIC KIDNEY DISEASE, WITH LONG-TERM CURRENT USE OF INSULIN, UNSPECIFIED CKD STAGE (HCC): Primary | ICD-10-CM

## 2021-11-21 DIAGNOSIS — E11.22 TYPE 2 DIABETES MELLITUS WITH CHRONIC KIDNEY DISEASE, WITH LONG-TERM CURRENT USE OF INSULIN, UNSPECIFIED CKD STAGE (HCC): Primary | ICD-10-CM

## 2021-11-21 RX ORDER — CLONIDINE HYDROCHLORIDE 0.1 MG/1
0.1 TABLET ORAL 2 TIMES DAILY PRN
Qty: 30 TABLET | Refills: 0 | OUTPATIENT
Start: 2021-11-21

## 2021-11-22 ENCOUNTER — PATIENT OUTREACH (OUTPATIENT)
Dept: CASE MANAGEMENT | Facility: OTHER | Age: 71
End: 2021-11-22

## 2021-11-22 NOTE — PATIENT INSTRUCTIONS
"https://www.nhlbi.nih.gov/files/docs/public/heart/dash_brief.pdf\">   DASH Eating Plan  DASH stands for Dietary Approaches to Stop Hypertension. The DASH eating plan is a healthy eating plan that has been shown to:  · Reduce high blood pressure (hypertension).  · Reduce your risk for type 2 diabetes, heart disease, and stroke.  · Help with weight loss.  What are tips for following this plan?  Reading food labels  · Check food labels for the amount of salt (sodium) per serving. Choose foods with less than 5 percent of the Daily Value of sodium. Generally, foods with less than 300 milligrams (mg) of sodium per serving fit into this eating plan.  · To find whole grains, look for the word \"whole\" as the first word in the ingredient list.  Shopping  · Buy products labeled as \"low-sodium\" or \"no salt added.\"  · Buy fresh foods. Avoid canned foods and pre-made or frozen meals.  Cooking  · Avoid adding salt when cooking. Use salt-free seasonings or herbs instead of table salt or sea salt. Check with your health care provider or pharmacist before using salt substitutes.  · Do not mackenzie foods. Cook foods using healthy methods such as baking, boiling, grilling, roasting, and broiling instead.  · Cook with heart-healthy oils, such as olive, canola, avocado, soybean, or sunflower oil.  Meal planning    · Eat a balanced diet that includes:  ? 4 or more servings of fruits and 4 or more servings of vegetables each day. Try to fill one-half of your plate with fruits and vegetables.  ? 6-8 servings of whole grains each day.  ? Less than 6 oz (170 g) of lean meat, poultry, or fish each day. A 3-oz (85-g) serving of meat is about the same size as a deck of cards. One egg equals 1 oz (28 g).  ? 2-3 servings of low-fat dairy each day. One serving is 1 cup (237 mL).  ? 1 serving of nuts, seeds, or beans 5 times each week.  ? 2-3 servings of heart-healthy fats. Healthy fats called omega-3 fatty acids are found in foods such as walnuts, " flaxseeds, fortified milks, and eggs. These fats are also found in cold-water fish, such as sardines, salmon, and mackerel.  · Limit how much you eat of:  ? Canned or prepackaged foods.  ? Food that is high in trans fat, such as some fried foods.  ? Food that is high in saturated fat, such as fatty meat.  ? Desserts and other sweets, sugary drinks, and other foods with added sugar.  ? Full-fat dairy products.  · Do not salt foods before eating.  · Do not eat more than 4 egg yolks a week.  · Try to eat at least 2 vegetarian meals a week.  · Eat more home-cooked food and less restaurant, buffet, and fast food.    Lifestyle  · When eating at a restaurant, ask that your food be prepared with less salt or no salt, if possible.  · If you drink alcohol:  ? Limit how much you use to:  § 0-1 drink a day for women who are not pregnant.  § 0-2 drinks a day for men.  ? Be aware of how much alcohol is in your drink. In the [place] one drink equals one 12 oz bottle of beer (355 mL), one 5 oz glass of wine (148 mL), or one 1½ oz glass of hard liquor (44 mL).  General information  · Avoid eating more than 2,300 mg of salt a day. If you have hypertension, you may need to reduce your sodium intake to 1,500 mg a day.  · Work with your health care provider to maintain a healthy body weight or to lose weight. Ask what an ideal weight is for you.  · Get at least 30 minutes of exercise that causes your heart to beat faster (aerobic exercise) most days of the week. Activities may include walking, swimming, or biking.  · Work with your health care provider or dietitian to adjust your eating plan to your individual calorie needs.  What foods should I eat?  Fruits  All fresh, dried, or frozen fruit. Canned fruit in natural juice (without added sugar).  Vegetables  Fresh or frozen vegetables (raw, steamed, roasted, or grilled). Low-sodium or reduced-sodium tomato and vegetable juice. Low-sodium or reduced-sodium tomato sauce and tomato paste.  Low-sodium or reduced-sodium canned vegetables.  Grains  Whole-grain or whole-wheat bread. Whole-grain or whole-wheat pasta. Brown rice. Oatmeal. Quinoa. Bulgur. Whole-grain and low-sodium cereals. Praveena bread. Low-fat, low-sodium crackers. Whole-wheat flour tortillas.  Meats and other proteins  Skinless chicken or turkey. Ground chicken or turkey. Pork with fat trimmed off. Fish and seafood. Egg whites. Dried beans, peas, or lentils. Unsalted nuts, nut butters, and seeds. Unsalted canned beans. Lean cuts of beef with fat trimmed off. Low-sodium, lean precooked or cured meat, such as sausages or meat loaves.  Dairy  Low-fat (1%) or fat-free (skim) milk. Reduced-fat, low-fat, or fat-free cheeses. Nonfat, low-sodium ricotta or cottage cheese. Low-fat or nonfat yogurt. Low-fat, low-sodium cheese.  Fats and oils  Soft margarine without trans fats. Vegetable oil. Reduced-fat, low-fat, or light mayonnaise and salad dressings (reduced-sodium). Canola, safflower, olive, avocado, soybean, and sunflower oils. Avocado.  Seasonings and condiments  Herbs. Spices. Seasoning mixes without salt.  Other foods  Unsalted popcorn and pretzels. Fat-free sweets.  The items listed above may not be a complete list of foods and beverages you can eat. Contact a dietitian for more information.  What foods should I avoid?  Fruits  Canned fruit in a light or heavy syrup. Fried fruit. Fruit in cream or butter sauce.  Vegetables  Creamed or fried vegetables. Vegetables in a cheese sauce. Regular canned vegetables (not low-sodium or reduced-sodium). Regular canned tomato sauce and paste (not low-sodium or reduced-sodium). Regular tomato and vegetable juice (not low-sodium or reduced-sodium). Pickles. Olives.  Grains  Baked goods made with fat, such as croissants, muffins, or some breads. Dry pasta or rice meal packs.  Meats and other proteins  Fatty cuts of meat. Ribs. Fried meat. Lin. Bologna, salami, and other precooked or cured meats, such as  sausages or meat loaves. Fat from the back of a pig (fatback). Bratwurst. Salted nuts and seeds. Canned beans with added salt. Canned or smoked fish. Whole eggs or egg yolks. Chicken or turkey with skin.  Dairy  Whole or 2% milk, cream, and half-and-half. Whole or full-fat cream cheese. Whole-fat or sweetened yogurt. Full-fat cheese. Nondairy creamers. Whipped toppings. Processed cheese and cheese spreads.  Fats and oils  Butter. Stick margarine. Lard. Shortening. Ghee. Lin fat. Tropical oils, such as coconut, palm kernel, or palm oil.  Seasonings and condiments  Onion salt, garlic salt, seasoned salt, table salt, and sea salt. Worcestershire sauce. Tartar sauce. Barbecue sauce. Teriyaki sauce. Soy sauce, including reduced-sodium. Steak sauce. Canned and packaged gravies. Fish sauce. Oyster sauce. Cocktail sauce. Store-bought horseradish. Ketchup. Mustard. Meat flavorings and tenderizers. Bouillon cubes. Hot sauces. Pre-made or packaged marinades. Pre-made or packaged taco seasonings. Relishes. Regular salad dressings.  Other foods  Salted popcorn and pretzels.  The items listed above may not be a complete list of foods and beverages you should avoid. Contact a dietitian for more information.  Where to find more information  · National Heart, Lung, and Blood [place]: www.nhlbi.nih.gov  · American Heart Association: www.heart.org  · Academy of Nutrition and Dietetics: www.eatright.org  · National Kidney Foundation: www.kidney.org  Summary  · The DASH eating plan is a healthy eating plan that has been shown to reduce high blood pressure (hypertension). It may also reduce your risk for type 2 diabetes, heart disease, and stroke.  · When on the DASH eating plan, aim to eat more fresh fruits and vegetables, whole grains, lean proteins, low-fat dairy, and heart-healthy fats.  · With the DASH eating plan, you should limit salt (sodium) intake to 2,300 mg a day. If you have hypertension, you may need to reduce your  sodium intake to 1,500 mg a day.  · Work with your health care provider or dietitian to adjust your eating plan to your individual calorie needs.  This information is not intended to replace advice given to you by your health care provider. Make sure you discuss any questions you have with your health care provider.  Document Revised: 11/20/2020 Document Reviewed: 11/20/2020  [employer] Patient Education © 2021 [employer]

## 2021-11-22 NOTE — OUTREACH NOTE
Ambulatory Case Management Note    Care Evaluation    Questions/Answers      Most Recent Value   Suggested Appointments Other (See Comment)  [Patient managing her diabeties with PCP, Clive REED. Recommended patient start seeing podiatry for regular feet checks. ]   Care Gaps Addressed Other (See Comment)  [Diabetic foot exams recommended. ]   Other Patient Education/Resources  MyChart,  Nutrition/Diet  [Patient actively working with PCP on diet management. Patient has worked with diabetic educator with diet and is working on adjusting diet for Kidney health too. ACM reviewed low salt diet and consistent exercise for blood pressure management. ]   MyChart Education Method --  [active]   Nutrition/Diet Education Method Verbal  [Attaching information on low sodium diet and high blood pressure management. ]   Advanced Directives: Patient Has   Medication Adherence Medications understood  [Patient has started taking Lantus and Humalog and adjusted diet and exercise to improve blood sugar levels.  ]   Healthy Lifestyle (Self-Efficacy) self-monitors vital signs appropriately  [Patient checking blood sugar regularly with Freestyle. ]   Patient Outreach Summary (AVS) Send Outreach Summary          Care Plan: Diabetes Management   Updates made since 11/22/2021 12:00 AM      Problem: HBA1C UNCONTROLLED       Goal: Establish Regular Follow-Ups with PCP       Task: Discuss schedule for PCP visits with patient Completed 11/22/2021   Responsible User: Shayla Coates, RN   Note:    Patient follows up with Clive REED regularly for diabetic management.        Goal: Reduce HbA1c levels below 9%       Task: Educate patient on diet and exercise Completed 11/22/2021   Responsible User: Shayla Coates, RN   Note:    Patient already working on adjusting carbs and has limited carb intake in the mornings. Patient has followed up with Diabetic Educator regarding diet adjustments after referral from Clive.        Task: Discuss diabetes  treatment plan with patient Completed 11/22/2021   Responsible User: Shayla Coates RN   Note:    Patient monitors blood sugar with Freestyle Hien. Reviewed devices made to help with securing the device to patient's arm. ACM recommended paper tape to assist with securing as well. Patient avoids lotions to the area and is gentle with care at this time. Patient also taking medications regularly and as prescribed.          Problem: PATIENT IS INACTIVE       Goal: Exercise at least 20 minutes per day       Task: Discuss barriers to activity with patient. Update SDOH.    Due Date: 12/20/2021   Responsible User: Shayla Coates RN      Task: Develop exercise plan with patient    Due Date: 12/20/2021   Responsible User: Shayla Coates RN   Note:    Reviewed with patient the importance of scheduling regular cardio exercise.      Task: Explore community resources including walking groups, assistance programs, and home videos.    Responsible User: Shayla Coates RN      Problem: KNOWLEDGE DEFICIT       Goal: Patient able to teach back disease management       Task: Educate on health prevention for diabetes: eye/foot exam, nephropathy screening, and potential statin use Completed 11/22/2021   Responsible User: Shayla Coates RN   Note:    Reviewed with patient regarding importance of Podiatry visits to have regular assessments of foot health.        Patient Outreach    Introduced self, explained [facility] RN role and provided contact information. Spoke with patient regarding health and wellness. Patient is a diabetic with [place] Medicare coverage. Patient has relocated from [place] to [place]. Patient is pleased with the holistic care provided by Clive REED's time. Patient has worked with diabetic educator regarding diet changes. Patient has started new medication routine and is learning her diabetes seems to be shifting from type 2 to type 1. Patient has no concerns with medication management at this time. She is  working to eat foods that are beneficial to her kidney function and that do not elevate her blood sugar. Patient is eating egg whites, sweet peppers, cranberries, and cauliflower for kidney health. Patient is decreasing carbs in the AM to balance glucose throughout the day. Patient recommended to watch for sodium in diet and eliminate to assist with blood pressure management. Patient also recommended to incorporate regular exercise to assist with blood sugar control and blood pressure control. Follow up schedule in 4 weeks to review patient's progress and continue supportive measures for patient's wellness.     Shayla Coates RN  Ambulatory Case Management    11/22/2021, 16:09 EST

## 2021-11-29 ENCOUNTER — TELEPHONE (OUTPATIENT)
Dept: FAMILY MEDICINE CLINIC | Facility: CLINIC | Age: 71
End: 2021-11-29

## 2021-11-29 NOTE — TELEPHONE ENCOUNTER
Caller: Mendy Woods    Relationship: Self    Best call back number: [phone]    What medications are you currently taking:   Current Outpatient Medications on File Prior to Visit   Medication Sig Dispense Refill   • Acetaminophen (TYLENOL 8 HOUR ARTHRITIS PAIN PO) Take  by mouth.     • aspirin 81 MG EC tablet Take  by mouth.     • atorvastatin (LIPITOR) 80 MG tablet TAKE 1 TABLET BY MOUTH EVERY DAY 90 tablet 3   • BucAlfAspKGlucCouchParsUvaUrJu (WATER PILLS PO) Take  by mouth.     • clobetasol (TEMOVATE) 0.05 % ointment Apply  topically to the appropriate area as directed 2 (Two) Times a Day. 45 g 0   • cloNIDine (Catapres) 0.1 MG tablet Take 1 tablet by mouth 2 (Two) Times a Day As Needed for High Blood Pressure. 30 tablet 0   • Cobalamin Combinations (B-12) 100-5000 MCG sublingual tablet Place  under the tongue.     • Continuous Blood Gluc  (FreeStyle Hien 14 Day [place]) device 1 KIT EVERY 3 (THREE) MONTHS. 1 each 1   • Continuous Blood Gluc Sensor (FreeStyle Hien 14 Day Sensor) misc USE 1 DEVICE EVERY 14 (FOURTEEN) DAYS. 2 each 3   • Diclofenac Sodium (VOLTAREN) 1 % gel gel Apply 4 g topically to the appropriate area as directed 4 (Four) Times a Day As Needed.     • ferrous sulfate 324 (65 Fe) MG tablet delayed-release EC tablet Take 324 mg by mouth 2 (Two) Times a Day With Meals.     • FREESTYLE LITE test strip USE 3 TIMES A DAY AS DIRECTED  3   • Insulin Glargine (LANTUS SOLOSTAR) 100 UNIT/ML injection pen 36 units daily 40 mL 3   • Insulin Lispro, 1 Unit Dial, (HumaLOG KwikPen) 100 UNIT/ML solution pen-injector 7 units 3 times a day with meals. 10 pen 1   • Insulin Pen Needle (BD Pen Needle Neli U/F) 32G X 4 MM misc 1 Device 2 (two) times a day. 200 each 1   • levocetirizine (XYZAL) 5 MG tablet Take 5 mg by mouth Every Evening.     • metoprolol succinate XL (Toprol XL) 50 MG 24 hr tablet Take 1.5 tablets by mouth Daily. 45 tablet 3   • NON FORMULARY / PATIENT SUPPLIED MEDICATION 75 %  metron   .5%metronidazle     • Synthroid 137 MCG tablet TAKE 1 TABLET BY MOUTH EVERY DAY 90 tablet 1   • Vitamin D, Cholecalciferol, (CHOLECALCIFEROL) 10 MCG (400 UNIT) tablet Take 400 Units by mouth Daily.     • vitamin E 100 UNIT capsule Take 100 Units by mouth Daily.       No current facility-administered medications on file prior to visit.          When did you start taking these medications: 11/18/21    Which medication are you concerned about: cloNIDine (Catapres) 0.1 MG tablet    Who prescribed you this medication: REECE LEO    What are your concerns: RINGING IN EARS    How long have you had these concerns: A FEW DAYS

## 2021-11-29 NOTE — TELEPHONE ENCOUNTER
cloNIDine (Catapres) 0.1 MG tablet, pt started medication on 11/10/21 and on 11/18/21 pt complains it started to cause ringing in her ears. Pt is aware you are not in the office today and it will be addressed tomorrow once you enter. Please send message back to me as ramana will not be in the office Tueday, so I can make sure it does it addressed and pt is called. Thank you

## 2021-11-30 NOTE — TELEPHONE ENCOUNTER
Spoke to patient, she states her blood pressure without the medication is high, she states the medicine really helps bring her blood pressure down. She also stated that her fasting glucose was 101, but was not fully fasting because she had to eat something in the middle of the night. She states she has gained about 10 pounds in one week, and her ankles are swollen. She is most concerned about this. She described the ringing more as hearing muffled elevator music in the background. States this is in her head and not in her ears.    Please advise.

## 2021-11-30 NOTE — TELEPHONE ENCOUNTER
Spoke with pt and advised her to d/c the clonindine and she will. She said that she will send us a message with her BP reading from her machine. Her new concerns are the weight gain and a new cough that comes from deep in her chest. She feels fine, no fever, chills does not feel sick just the cough. Its throughout the day but she hears it more at night. If you want her to come in to be seen please let her know.

## 2021-11-30 NOTE — TELEPHONE ENCOUNTER
This was only to be used as needed. If it is causing ringing in her ears, recommend d/c. How is her bp without the medication? And how are her fasting glucose?

## 2021-12-03 ENCOUNTER — OFFICE VISIT (OUTPATIENT)
Dept: FAMILY MEDICINE CLINIC | Facility: CLINIC | Age: 71
End: 2021-12-03

## 2021-12-03 VITALS
SYSTOLIC BLOOD PRESSURE: 140 MMHG | OXYGEN SATURATION: 99 % | HEART RATE: 58 BPM | DIASTOLIC BLOOD PRESSURE: 80 MMHG | RESPIRATION RATE: 16 BRPM | BODY MASS INDEX: 42.12 KG/M2 | WEIGHT: 223.1 LBS | HEIGHT: 61 IN | TEMPERATURE: 95 F

## 2021-12-03 DIAGNOSIS — R06.01 ORTHOPNEA: Primary | ICD-10-CM

## 2021-12-03 DIAGNOSIS — R63.5 WEIGHT GAIN: ICD-10-CM

## 2021-12-03 DIAGNOSIS — R00.2 PALPITATIONS: ICD-10-CM

## 2021-12-03 DIAGNOSIS — R60.1 GENERALIZED EDEMA: ICD-10-CM

## 2021-12-03 PROCEDURE — 99214 OFFICE O/P EST MOD 30 MIN: CPT | Performed by: NURSE PRACTITIONER

## 2021-12-03 RX ORDER — METOLAZONE 5 MG/1
TABLET ORAL
Qty: 2 TABLET | Refills: 0 | Status: SHIPPED | OUTPATIENT
Start: 2021-12-03 | End: 2021-12-13

## 2021-12-03 RX ORDER — CLONIDINE HYDROCHLORIDE 0.1 MG/1
0.1 TABLET ORAL 2 TIMES DAILY
Qty: 60 TABLET | Refills: 1 | Status: SHIPPED | OUTPATIENT
Start: 2021-12-03 | End: 2021-12-27

## 2021-12-03 NOTE — PROGRESS NOTES
"Chief Complaint  Hypertension and Weight Gain    Subjective          Mendy Woods presents to [facility]  Mendy presents for weight gain, approximately 20 lbs, at her last visit she was 207 lbs 11/18, now at 223 lbs. Orthopnea. Occasional wheezing at night. Cough, productive in am, frequent throat clearing,     At times irregular heart rate, racing at times, usually occurs with low blood sugars, denies dizziness or shortness of breath related to heart rate.   Shortness of Breath  This is a new problem. The current episode started 1 to 4 weeks ago. The problem occurs daily. The problem has been waxing and waning. Associated symptoms include leg swelling, orthopnea and wheezing. Pertinent negatives include no abdominal pain, chest pain, claudication, coryza, ear pain, fever, headaches, hemoptysis, leg pain, neck pain, PND, rash, rhinorrhea, sore throat, sputum production, swollen glands, syncope or vomiting. Nothing aggravates the symptoms. The patient has no known risk factors for DVT/PE. She has tried nothing for the symptoms. The treatment provided no relief.       Objective   Vital Signs:   /80   Pulse 58   Temp 95 °F (35 °C) (Infrared)   Resp 16   Ht 154.9 cm (61\")   Wt 101 kg (223 lb 1.6 oz)   SpO2 99%   BMI 42.15 kg/m²     Physical Exam  Constitutional:       General: She is not in acute distress.     Appearance: She is well-developed. She is not diaphoretic.   Neck:      Vascular: No JVD.   Cardiovascular:      Rate and Rhythm: Normal rate and regular rhythm.      Heart sounds: Normal heart sounds. No murmur heard.  No friction rub. No gallop.    Pulmonary:      Effort: Pulmonary effort is normal. No respiratory distress.      Breath sounds: Normal breath sounds. No wheezing or rales.   Abdominal:      General: Bowel sounds are normal. There is no distension.      Palpations: Abdomen is soft.      Tenderness: There is no abdominal tenderness.   Musculoskeletal: "      Cervical back: Neck supple.      Right lower le+ Pitting Edema present.      Left lower le+ Pitting Edema present.   Skin:     General: Skin is warm and dry.   Neurological:      Mental Status: She is alert and oriented to person, place, and time.        Result Review :                 Assessment and Plan    Diagnoses and all orders for this visit:    1. Orthopnea (Primary)  -     Adult Transthoracic Echo Complete W/ Cont if Necessary Per Protocol; Future  -     Holter Monitor - 72 Hour Up To 15 Days; Future  -     proBNP    2. Weight gain  -     Adult Transthoracic Echo Complete W/ Cont if Necessary Per Protocol; Future  -     Holter Monitor - 72 Hour Up To 15 Days; Future  -     proBNP    3. Palpitations  -     Holter Monitor - 72 Hour Up To 15 Days; Future    4. Generalized edema   -     Adult Transthoracic Echo Complete W/ Cont if Necessary Per Protocol; Future  -     metOLazone (ZAROXOLYN) 5 MG tablet; Take 1 tab po x 1 today, repeat on Monday x 1  Dispense: 2 tablet; Refill: 0    Other orders  -     cloNIDine (Catapres) 0.1 MG tablet; Take 1 tablet by mouth 2 (Two) Times a Day.  Dispense: 60 tablet; Refill: 1      I spent 45 minutes caring for Mendy on this date of service. This time includes time spent by me in the following activities:performing a medically appropriate examination and/or evaluation , counseling and educating the patient/family/caregiver, ordering medications, tests, or procedures, referring and communicating with other health care professionals  and documenting information in the medical record  Follow Up   No follow-ups on file.  Patient was given instructions and counseling regarding her condition or for health maintenance advice. Please see specific information pulled into the AVS if appropriate.     Concern for CHF given acute weight gain since increasing metoprolol, discussed with patient it is a potential cause  Will use metolazone 5 mg today and repeat dose on  Monday  Decrease metoprolol to 25 mg daily  Increase clonidine to 0.1 mg twice daily as she has continued to use this and she is having spikes in the afternoon as it is not maintaining at that dose.     She had two episodes of low blood sugar, will reduce lantus to 36 units and monitor over the weekend  She will follow up on Monday to further evaluate symptoms and weight/bp    She has been instructed to weigh daily  Avoid sodium, discussed hidden sodium in processed foods including soups/vegetables/deli meats etc.   Activity as tolerated    Will proceed with echo and holter due to symptoms and she reports occasional palpitations although sounds related more to low blood sugars.  Heart sounds are normal in office, rate is normal

## 2021-12-03 NOTE — PATIENT INSTRUCTIONS
Decrease metoprolol to 25 mg daily  Start metolazone 5 mg today, x 1, then repeat on Monday x 1  Increase clonidine .1 mg to twice daily  Decrease lantus to 36 units daily

## 2021-12-06 ENCOUNTER — OFFICE VISIT (OUTPATIENT)
Dept: FAMILY MEDICINE CLINIC | Facility: CLINIC | Age: 71
End: 2021-12-06

## 2021-12-06 VITALS
BODY MASS INDEX: 41.46 KG/M2 | OXYGEN SATURATION: 96 % | HEIGHT: 61 IN | DIASTOLIC BLOOD PRESSURE: 94 MMHG | TEMPERATURE: 95 F | SYSTOLIC BLOOD PRESSURE: 146 MMHG | HEART RATE: 75 BPM | WEIGHT: 219.6 LBS | RESPIRATION RATE: 16 BRPM

## 2021-12-06 DIAGNOSIS — I10 ESSENTIAL HYPERTENSION: ICD-10-CM

## 2021-12-06 DIAGNOSIS — R63.5 WEIGHT GAIN: Primary | ICD-10-CM

## 2021-12-06 PROCEDURE — 99213 OFFICE O/P EST LOW 20 MIN: CPT | Performed by: NURSE PRACTITIONER

## 2021-12-06 RX ORDER — METOPROLOL SUCCINATE 50 MG/1
TABLET, EXTENDED RELEASE ORAL
Qty: 30 TABLET | OUTPATIENT
Start: 2021-12-06

## 2021-12-06 NOTE — TELEPHONE ENCOUNTER
Rx Refill Note  Requested Prescriptions     Pending Prescriptions Disp Refills   • metoprolol succinate XL (TOPROL-XL) 50 MG 24 hr tablet [Pharmacy Med Name: METOPROLOL SUCC ER 50 MG TAB] 30 tablet      Sig: TAKE 1 TABLET BY MOUTH EVERY DAY      Last office visit with prescribing clinician: 12/3/2021      Next office visit with prescribing clinician: 12/6/2021            Selvin Muller MA  12/06/21, 15:54 EST

## 2021-12-06 NOTE — PROGRESS NOTES
"Chief Complaint  Hypertension (FU) and Cough (low gradex6 to 7 wks ago not at all times)    Subjective          Mendy Woods presents to [facility]  Mendy presents with acute weight gain, took metolazone x 2 doses, last dose this am. Weight is down 4 lbs since her visit on Friday. She does report improvement in her lower extremity swelling, but it is not resolved. She is establishing care with nephrology, visit in am.     Cough, intermittent x 6-7 weeks.      Previous diagnosis of fungal infection in throat, started diflucan and felt improvement x 24 hours. Concerned cough may be fungal in nature.    Hypertension  This is a chronic problem. The current episode started more than 1 year ago. The problem has been waxing and waning since onset. The problem is uncontrolled. Associated symptoms include peripheral edema. Pertinent negatives include no anxiety, blurred vision, malaise/fatigue, neck pain, orthopnea, palpitations or PND. There are no associated agents to hypertension. Risk factors for coronary artery disease include diabetes mellitus, dyslipidemia, obesity and post-menopausal state. Past treatments include ACE inhibitors, beta blockers, calcium channel blockers, diuretics and angiotensin blockers. Current antihypertension treatment includes central alpha agonists and beta blockers. The current treatment provides moderate improvement.       Objective   Vital Signs:   /94   Pulse 75   Temp 95 °F (35 °C) (Infrared)   Resp 16   Ht 154.9 cm (61\")   Wt 99.6 kg (219 lb 9.6 oz)   SpO2 96%   BMI 41.49 kg/m²     Physical Exam  Vitals reviewed.   Constitutional:       Appearance: Normal appearance.   Cardiovascular:      Rate and Rhythm: Normal rate and regular rhythm.      Heart sounds: No murmur heard.  No friction rub. No gallop.    Pulmonary:      Effort: Pulmonary effort is normal. No respiratory distress.      Breath sounds: Normal breath sounds. No wheezing, " rhonchi or rales.   Musculoskeletal:      Right lower le+ Edema present.      Left lower le+ Edema present.   Neurological:      Mental Status: She is alert and oriented to person, place, and time.   Psychiatric:         Mood and Affect: Mood normal.        Result Review :                 Assessment and Plan    Diagnoses and all orders for this visit:    1. Weight gain (Primary)    2. Essential hypertension        Follow Up   No follow-ups on file.  Patient was given instructions and counseling regarding her condition or for health maintenance advice. Please see specific information pulled into the AVS if appropriate.     Weight is improving using two doses of metolazone, last dose this morning, weight is down 4 lbs over the weekend and likely not seeing result of todays dose  Will hold on diuretic, unable to use lasix or bumex secondary to sulfa allergy, reaction swelling lips  Consider hctz, but will await nephrology consult tomorrow and contact patient  Discussed with patient difficulty of treating her with her kidney function being decreased, however with acute weight gain, suspect CHF, need fluid level off. We are tapering metoprolol down, she is currently taking 25 mg, will d/c in two weeks, discussed need for slow taper

## 2021-12-07 ENCOUNTER — TRANSCRIBE ORDERS (OUTPATIENT)
Dept: ADMINISTRATIVE | Facility: HOSPITAL | Age: 71
End: 2021-12-07

## 2021-12-07 DIAGNOSIS — N18.30 STAGE 3 CHRONIC KIDNEY DISEASE, UNSPECIFIED WHETHER STAGE 3A OR 3B CKD (HCC): Primary | ICD-10-CM

## 2021-12-07 LAB — NT-PROBNP SERPL-MCNC: 2004 PG/ML (ref 0–301)

## 2021-12-08 ENCOUNTER — TELEPHONE (OUTPATIENT)
Dept: FAMILY MEDICINE CLINIC | Facility: CLINIC | Age: 71
End: 2021-12-08

## 2021-12-08 ENCOUNTER — TELEPHONE (OUTPATIENT)
Dept: CARDIOLOGY | Facility: CLINIC | Age: 71
End: 2021-12-08

## 2021-12-08 DIAGNOSIS — R63.5 WEIGHT GAIN: Primary | ICD-10-CM

## 2021-12-08 DIAGNOSIS — I10 ESSENTIAL HYPERTENSION: ICD-10-CM

## 2021-12-08 NOTE — TELEPHONE ENCOUNTER
Pt is aware the ref was just placed today, we will need to collect all information to send over to cardiology. Once they receive it they will call pt and get her scheduled. Pt gave verbal understanding.

## 2021-12-08 NOTE — TELEPHONE ENCOUNTER
Kierra from [facility] called on behalf of pt. They say she called them about a referral, but they have not received a referral from the office. Kierra is trying to get clarification on what is going on and if she needs to get in to see them. Their fax number is [phone] and a good phone number for kierra is [phone]

## 2021-12-08 NOTE — TELEPHONE ENCOUNTER
Referral placed, can you call and get records from Dr. Gimenez office regarding their recommendation

## 2021-12-08 NOTE — TELEPHONE ENCOUNTER
"Pt is calling in \"returning a call from our office\"  She explains to me she is 22lbs up and her pcp and nephrologist were supposed to make a plan for her and let her know.  She hasn't heard back from her PCP and there is no notation from anyone in our office that they called.    I have called her PCP and left a message with Giana asking for more information to see if we need to get this pt an apt.  Thanks  Kierra Milian RN  Triage nurse    "

## 2021-12-09 ENCOUNTER — TELEPHONE (OUTPATIENT)
Dept: FAMILY MEDICINE CLINIC | Facility: CLINIC | Age: 71
End: 2021-12-09

## 2021-12-09 NOTE — TELEPHONE ENCOUNTER
Provider: REECE LEO    Caller: BERNARDO WESLEY    Relationship to Patient: SELF    Phone Number: [phone]    Reason for Call: PATIENT IS LETTING OFFICE KNOW THAT SHE HAS AN APPOINTMENT WITH A CARDIOLOGIST ON Monday 12/13/21 AT 1:00PM. THE APPOINTMENT SHOULD ALSO BE POSTED ON HER [employer]T. SHE CANNOT REMEMBER THE DR'S NAME.

## 2021-12-13 ENCOUNTER — OFFICE VISIT (OUTPATIENT)
Dept: CARDIOLOGY | Facility: CLINIC | Age: 71
End: 2021-12-13

## 2021-12-13 VITALS
HEART RATE: 68 BPM | DIASTOLIC BLOOD PRESSURE: 88 MMHG | HEIGHT: 61 IN | BODY MASS INDEX: 41.8 KG/M2 | SYSTOLIC BLOOD PRESSURE: 166 MMHG | WEIGHT: 221.4 LBS

## 2021-12-13 DIAGNOSIS — I10 ESSENTIAL HYPERTENSION: Primary | ICD-10-CM

## 2021-12-13 DIAGNOSIS — I12.9 BENIGN HYPERTENSION WITH CKD (CHRONIC KIDNEY DISEASE) STAGE III (HCC): ICD-10-CM

## 2021-12-13 DIAGNOSIS — E78.00 PURE HYPERCHOLESTEROLEMIA: ICD-10-CM

## 2021-12-13 DIAGNOSIS — E66.01 MORBID OBESITY WITH BMI OF 40.0-44.9, ADULT (HCC): ICD-10-CM

## 2021-12-13 DIAGNOSIS — R06.09 DOE (DYSPNEA ON EXERTION): ICD-10-CM

## 2021-12-13 DIAGNOSIS — N18.30 BENIGN HYPERTENSION WITH CKD (CHRONIC KIDNEY DISEASE) STAGE III (HCC): ICD-10-CM

## 2021-12-13 PROCEDURE — 93000 ELECTROCARDIOGRAM COMPLETE: CPT | Performed by: INTERNAL MEDICINE

## 2021-12-13 PROCEDURE — 99204 OFFICE O/P NEW MOD 45 MIN: CPT | Performed by: INTERNAL MEDICINE

## 2021-12-13 RX ORDER — LOSARTAN POTASSIUM 25 MG/1
25 TABLET ORAL DAILY
COMMUNITY
Start: 2021-12-07 | End: 2022-03-04 | Stop reason: SDUPTHER

## 2021-12-13 RX ORDER — CARVEDILOL 12.5 MG/1
12.5 TABLET ORAL 2 TIMES DAILY WITH MEALS
Qty: 180 TABLET | Refills: 3 | Status: SHIPPED | OUTPATIENT
Start: 2021-12-13 | End: 2022-01-24 | Stop reason: SDUPTHER

## 2021-12-13 NOTE — PROGRESS NOTES
[facility] New Patient Office Note     Encounter Date:21  Patient:Mendy Woods  :1950  MRN:[id number]    Referring Provider: Felciia Duffy*    Consulted for: Hypertension    Chief Complaint:   Chief Complaint   Patient presents with   • Hypertension   • Weight Gain   • Congestive Heart Failure       History of Presenting Illness:      Ms. Marrero is a 71 y.o. woman with past medical history notable for hypertension, mixed hyperlipidemia, morbid obesity, diabetes type 2 on insulin therapy, and chronic kidney disease stage IIIa who presents to our office to establish with a cardiologist due to symptoms of dyspnea on exertion, leg swelling, and hypertension.  In general patient had been doing reasonably well but recently did have medications changed over the last year.  From what I can tell she has had somewhat borderline elevated blood pressures for a number of years but recently did have some of her diabetic medications changed and previously she was on losartan for a number of years but this was stopped due to concerns regarding her kidney function.  She was changed over to amlodipine but it does seem like this caused significant worsening leg swelling and shortness of breath as well as weight gain.  She was taken off of amlodipine and switch to a number of different agents including metoprolol and clonidine and most recently just restarted losartan at a lower dose 25 mg daily per Dr. Holley our nephrologist recommendation.  In general she is doing slightly better.  Her leg swelling is improving but still not back to her previous baseline.  Similarly her weight is also improving but again not back to her previous baseline.  She does have a fair amount of dyspnea on exertion shortness of breath.  She was just started on losartan about a week ago and there were plans to let her get follow-up labs in about a week or so by her nephrologist she has not yet gotten done this  yet.      Review of Systems:  Review of Systems   Constitutional: Positive for malaise/fatigue and weight gain.   HENT: Negative.    Eyes: Negative.    Cardiovascular: Positive for dyspnea on exertion and leg swelling.   Respiratory: Positive for shortness of breath.    Endocrine: Negative.    Hematologic/Lymphatic: Negative.    Skin: Negative.    Musculoskeletal: Negative.    Gastrointestinal: Negative.    Genitourinary: Negative.    Neurological: Negative.    Psychiatric/Behavioral: Negative.    Allergic/Immunologic: Negative.        Current Outpatient Medications on File Prior to Visit   Medication Sig Dispense Refill   • aspirin 81 MG EC tablet Take  by mouth.     • atorvastatin (LIPITOR) 80 MG tablet TAKE 1 TABLET BY MOUTH EVERY DAY 90 tablet 3   • cloNIDine (Catapres) 0.1 MG tablet Take 1 tablet by mouth 2 (Two) Times a Day. 60 tablet 1   • Cobalamin Combinations (B-12) 100-5000 MCG sublingual tablet Place  under the tongue.     • Continuous Blood Gluc  (FreeStyle Hien 14 Day Mikana) device 1 KIT EVERY 3 (THREE) MONTHS. 1 each 1   • Continuous Blood Gluc Sensor (FreeStyle Hien 14 Day Sensor) misc USE 1 DEVICE EVERY 14 (FOURTEEN) DAYS. 2 each 3   • Diclofenac Sodium (VOLTAREN) 1 % gel gel Apply 4 g topically to the appropriate area as directed 4 (Four) Times a Day As Needed.     • ferrous sulfate 324 (65 Fe) MG tablet delayed-release EC tablet Take 324 mg by mouth 2 (Two) Times a Day With Meals.     • FREESTYLE LITE test strip USE 3 TIMES A DAY AS DIRECTED  3   • Insulin Glargine (LANTUS SOLOSTAR) 100 UNIT/ML injection pen 36 units daily 40 mL 3   • Insulin Lispro, 1 Unit Dial, (HumaLOG KwikPen) 100 UNIT/ML solution pen-injector 7 units 3 times a day with meals. 10 pen 1   • Insulin Pen Needle (BD Pen Needle Neli U/F) 32G X 4 MM misc 1 Device 2 (two) times a day. 200 each 1   • levocetirizine (XYZAL) 5 MG tablet Take 5 mg by mouth Every Evening.     • losartan (COZAAR) 25 MG tablet Take 25 mg by mouth  Daily.     • NON FORMULARY / PATIENT SUPPLIED MEDICATION 75 % metron   .5%metronidazle     • Synthroid 137 MCG tablet TAKE 1 TABLET BY MOUTH EVERY DAY 90 tablet 1   • Vitamin D, Cholecalciferol, (CHOLECALCIFEROL) 10 MCG (400 UNIT) tablet Take 400 Units by mouth Daily.     • vitamin E 100 UNIT capsule Take 100 Units by mouth Daily.     • [DISCONTINUED] Acetaminophen (TYLENOL 8 HOUR ARTHRITIS PAIN PO) Take  by mouth.     • [DISCONTINUED] clobetasol (TEMOVATE) 0.05 % ointment Apply  topically to the appropriate area as directed 2 (Two) Times a Day. 45 g 0   • [DISCONTINUED] cloNIDine (Catapres) 0.1 MG tablet Take 1 tablet by mouth 2 (Two) Times a Day As Needed for High Blood Pressure. 30 tablet 0   • [DISCONTINUED] metOLazone (ZAROXOLYN) 5 MG tablet Take 1 tab po x 1 today, repeat on Monday x 1 2 tablet 0   • [DISCONTINUED] metoprolol succinate XL (Toprol XL) 50 MG 24 hr tablet Take 1.5 tablets by mouth Daily. 45 tablet 3     No current facility-administered medications on file prior to visit.       Allergies   Allergen Reactions   • Sulfa Antibiotics Other (See Comments)     Facial swelling   • Sulfamethazine Swelling       Past Medical History:   Diagnosis Date   • Abnormal blood chemistry test    • Allergic rhinitis    • Anemia, B12 deficiency    • Arthritis    • Cutaneous lupus erythematosus    • Dermatitis    • Disorder of kidney and ureter    • Edema of optic nerve    • Herpes zoster    • Hyperlipidemia    • Hypertension    • Laryngitis    • Low back pain    • Pernicious anemia    • Renal failure    • Sarcoidosis    • Skin rash    • Tonsillitis    • Type 1 diabetes mellitus (HCC)    • Type 2 diabetes mellitus, controlled (HCC)    • Vitamin B deficiency        Past Surgical History:   Procedure Laterality Date   • COLONOSCOPY  2007    Normal.   • LYMPH NODE BIOPSY      sarcoid       Social History     Socioeconomic History   • Marital status:    Tobacco Use   • Smoking status: Never Smoker   • Smokeless  "tobacco: Never Used   • Tobacco comment: occas caffeine use    Vaping Use   • Vaping Use: Never used   Substance and Sexual Activity   • Alcohol use: Yes     Comment: rarely   • Drug use: No   • Sexual activity: Yes     Partners: Male     Comment:        Family History   Problem Relation Age of Onset   • Thyroid disease Mother    • Diabetes Father    • Heart attack Father    • Hashimoto's thyroiditis Sister    • Hashimoto's thyroiditis Daughter    • Diabetes Paternal Grandmother    • Hashimoto's thyroiditis Sister    • Hashimoto's thyroiditis Daughter    • Cancer Paternal Grandfather        The following portions of the patient's history were reviewed and updated as appropriate: allergies, current medications, past family history, past medical history, past social history, past surgical history and problem list.       Objective:       Vitals:    12/13/21 1307   BP: 166/88   BP Location: Left arm   Patient Position: Sitting   Pulse: 68   Weight: 100 kg (221 lb 6.4 oz)   Height: 154.9 cm (61\")       Body mass index is 41.83 kg/m².    Physical Exam:  Constitutional: Well appearing, Well-developed, No acute distress   HENT: Oropharynx clear and membrane moist  Eyes: Normal conjunctiva, no sclera icterus.  Neck: Supple, no carotid bruit bilaterally.  Cardiovascular: Regular rate and rhythm, No Murmur, No bilateral lower extremity edema.  Pulmonary: Normal respiratory effort, Normal lung sounds, no wheezing.  Abdominal: Soft, nontender, no hepatosplenomegaly, liver is non-pulsatile.  Neurological: Alert and orient x 3.   Skin: Warm, dry, no ecchymosis, no rash.  Psych: Appropriate mood and affect. Normal judgment and insight.      Lab Results   Component Value Date     11/18/2021     10/15/2021    K 4.9 11/18/2021    K 4.9 10/15/2021     11/18/2021     10/15/2021    CO2 18 (L) 11/18/2021    CO2 26.1 10/15/2021    BUN 24 11/18/2021    BUN 16 10/15/2021    CREATININE 1.92 (H) 11/18/2021    " CREATININE 1.70 (H) 10/15/2021    EGFRIFNONA 26 (L) 11/18/2021    EGFRIFNONA 30 (L) 10/15/2021    EGFRIFAFRI 30 (L) 11/18/2021    EGFRIFAFRI 36 (L) 10/15/2021    GLUCOSE 246 (H) 11/18/2021    GLUCOSE 215 (H) 10/15/2021    CALCIUM 9.5 11/18/2021    CALCIUM 9.3 10/15/2021    PROTENTOTREF 7.2 11/18/2021    PROTENTOTREF 7.7 07/21/2021    ALBUMIN 4.1 11/18/2021    ALBUMIN 4.40 07/21/2021    BILITOT 0.4 11/18/2021    BILITOT 0.5 07/21/2021    AST 15 11/18/2021    AST 15 07/21/2021    ALT 10 11/18/2021    ALT 12 07/21/2021     Lab Results   Component Value Date    WBC 11.52 (H) 06/08/2021    WBC 9.14 10/23/2020    HGB 13.3 06/08/2021    HGB 13.1 10/23/2020    HCT 40.5 06/08/2021    HCT 40.4 10/23/2020    MCV 87.3 06/08/2021    MCV 87.6 10/23/2020     06/08/2021     10/23/2020     Lab Results   Component Value Date    CHOL 148 10/23/2020    CHOL 177 10/03/2018    TRIG 145 07/21/2021    TRIG 146 04/20/2021    HDL 47 07/21/2021    HDL 51 04/20/2021    LDL 94 07/21/2021    LDL 93 04/20/2021     Lab Results   Component Value Date    PROBNP 2,004 (H) 12/06/2021     No results found for: CKTOTAL, CKMB, CKMBINDEX, TROPONINI, TROPONINT  Lab Results   Component Value Date    TSH 0.850 07/21/2021    TSH 0.514 06/16/2021         ECG 12 Lead    Date/Time: 12/13/2021 2:26 PM  Performed by: Yordan Fuchs MD  Authorized by: Yordan Fuchs MD   Comparison: compared with previous ECG from 12/4/2017  Similar to previous ECG  Rhythm: sinus rhythm  Other findings: low voltage                  Assessment:          Diagnosis Plan   1. Essential hypertension  Basic Metabolic Panel    Magnesium   2. Benign hypertension with CKD (chronic kidney disease) stage III (formerly [facility])  ECG 12 Lead   3. Morbid obesity with BMI of 40.0-44.9, adult (formerly [facility])     4. Pure hypercholesterolemia            Plan:       Ms. Marrero is a 71 y.o. woman with past medical history notable for hypertension, mixed hyperlipidemia, morbid obesity, diabetes  type 2 on insulin therapy, and chronic kidney disease stage IIIa who presents to our office to establish with a cardiologist due to symptoms of dyspnea on exertion, leg swelling, and hypertension.  Her primary care physician had already appropriately stopped her amlodipine and her symptoms are getting better.  Additionally she is also ordered an echocardiogram and Holter monitor which again I also agree with.  These were ordered for the hospital and were scheduled pretty far out initially first in February and then in January.  I have talked with our schedulers and we can actually perform those in our office sooner and we will work on getting that done more expeditiously as I think this would help guide our therapy to make sure we are not missing any significant left ventricular dysfunction or pulmonary hypertension or valvular abnormalities.  Given that her diastolic pressures are fairly high I think she likely would benefit from diuretic therapy but I agree with Dr. Holley according to his notes it would like to first get her on losartan and reassess her kidney function.  I have ordered a BMP and magnesium level to do so.  Additionally I would like to try and optimize her blood pressure control and have tried stopping her metoprolol and changing her to her carvedilol.  We can uptitrate this accordingly.  Hopefully we can wean her off of clonidine at some point if we get better blood pressure control.  My hope is that we can get her on some form of diuretic but would like to do this in coordination with our nephrology team.    Dyspnea on exertion:  · Echocardiogram is already been ordered by primary care physician will make sure this gets scheduled and follow-up on results  · proBNP level elevated suggestive of either diastolic or systolic congestive heart failure  · We will try and optimize blood pressure control and stop metoprolol succinate and change to carvedilol 12.5 mg twice daily and will titrate  accordingly  · Repeat BMP and magnesium ordered to see if we cannot add a diuretic in the future    Hypertension:  · We will try and optimize blood pressure control with stopping metoprolol and changing to carvedilol  · Hopefully can add a diuretic in the future  · Continue losartan  · We will try and wean clonidine once blood pressure compounds better control  · Repeat BMP ordered    Mixed hyperlipidemia:  · Continue high potency statin  · Lipid panel 7/2021 demonstrates no controlled LDL and total cholesterol  · CMP 11/2021 demonstrates normal ALT and AST    Follow Up:  6 weeks    Thank you for allowing me to participate in the care of Mendy Woods. Feel free to contact me directly with any further questions or concerns.    Yordan Fuchs MD  [facility]  12/13/21  14:27 EST

## 2021-12-13 NOTE — PATIENT INSTRUCTIONS
1. Stop metoprolol  2. Start Carvedilol 12.5 mg twice  3. Will get follow up lab work to evaluate kidney function and electrolytes

## 2021-12-16 ENCOUNTER — APPOINTMENT (OUTPATIENT)
Dept: CARDIOLOGY | Facility: HOSPITAL | Age: 71
End: 2021-12-16

## 2021-12-20 ENCOUNTER — HOSPITAL ENCOUNTER (OUTPATIENT)
Dept: CARDIOLOGY | Facility: HOSPITAL | Age: 71
Discharge: HOME | End: 2021-12-20
Admitting: NURSE PRACTITIONER

## 2021-12-20 VITALS
SYSTOLIC BLOOD PRESSURE: 194 MMHG | HEART RATE: 65 BPM | BODY MASS INDEX: 41.72 KG/M2 | DIASTOLIC BLOOD PRESSURE: 96 MMHG | HEIGHT: 61 IN | WEIGHT: 221 LBS

## 2021-12-20 DIAGNOSIS — R60.1 GENERALIZED EDEMA: ICD-10-CM

## 2021-12-20 DIAGNOSIS — R06.01 ORTHOPNEA: ICD-10-CM

## 2021-12-20 DIAGNOSIS — R63.5 WEIGHT GAIN: ICD-10-CM

## 2021-12-20 LAB
AORTIC ARCH: 2.7 CM
ASCENDING AORTA: 3.9 CM
BH CV ECHO MEAS - ACS: 2.4 CM
BH CV ECHO MEAS - AO MAX PG (FULL): 1.1 MMHG
BH CV ECHO MEAS - AO MAX PG: 4.7 MMHG
BH CV ECHO MEAS - AO MEAN PG (FULL): 0.89 MMHG
BH CV ECHO MEAS - AO MEAN PG: 3.4 MMHG
BH CV ECHO MEAS - AO ROOT AREA (BSA CORRECTED): 2.1
BH CV ECHO MEAS - AO ROOT AREA: 13.8 CM^2
BH CV ECHO MEAS - AO ROOT DIAM: 4.2 CM
BH CV ECHO MEAS - AO V2 MAX: 108.6 CM/SEC
BH CV ECHO MEAS - AO V2 MEAN: 89.1 CM/SEC
BH CV ECHO MEAS - AO V2 VTI: 31.1 CM
BH CV ECHO MEAS - ASC AORTA: 3.9 CM
BH CV ECHO MEAS - AVA(I,A): 2.7 CM^2
BH CV ECHO MEAS - AVA(I,D): 2.7 CM^2
BH CV ECHO MEAS - AVA(V,A): 2.8 CM^2
BH CV ECHO MEAS - AVA(V,D): 2.8 CM^2
BH CV ECHO MEAS - BSA(HAYCOCK): 2.1 M^2
BH CV ECHO MEAS - BSA: 2 M^2
BH CV ECHO MEAS - BZI_BMI: 41.8 KILOGRAMS/M^2
BH CV ECHO MEAS - BZI_METRIC_HEIGHT: 154.9 CM
BH CV ECHO MEAS - BZI_METRIC_WEIGHT: 100.2 KG
BH CV ECHO MEAS - EDV(CUBED): 76.5 ML
BH CV ECHO MEAS - EDV(MOD-SP2): 84 ML
BH CV ECHO MEAS - EDV(MOD-SP4): 68 ML
BH CV ECHO MEAS - EDV(TEICH): 80.6 ML
BH CV ECHO MEAS - EF(CUBED): 50.4 %
BH CV ECHO MEAS - EF(MOD-BP): 69.3 %
BH CV ECHO MEAS - EF(MOD-SP2): 71.4 %
BH CV ECHO MEAS - EF(MOD-SP4): 66.2 %
BH CV ECHO MEAS - EF(TEICH): 42.8 %
BH CV ECHO MEAS - ESV(CUBED): 37.9 ML
BH CV ECHO MEAS - ESV(MOD-SP2): 24 ML
BH CV ECHO MEAS - ESV(MOD-SP4): 23 ML
BH CV ECHO MEAS - ESV(TEICH): 46.1 ML
BH CV ECHO MEAS - FS: 20.9 %
BH CV ECHO MEAS - IVS/LVPW: 0.9
BH CV ECHO MEAS - IVSD: 1.3 CM
BH CV ECHO MEAS - LAT PEAK E' VEL: 5.7 CM/SEC
BH CV ECHO MEAS - LV DIASTOLIC VOL/BSA (35-75): 34.5 ML/M^2
BH CV ECHO MEAS - LV MASS(C)D: 232.2 GRAMS
BH CV ECHO MEAS - LV MASS(C)DI: 117.8 GRAMS/M^2
BH CV ECHO MEAS - LV MAX PG: 3.6 MMHG
BH CV ECHO MEAS - LV MEAN PG: 2.5 MMHG
BH CV ECHO MEAS - LV SYSTOLIC VOL/BSA (12-30): 11.7 ML/M^2
BH CV ECHO MEAS - LV V1 MAX: 95.2 CM/SEC
BH CV ECHO MEAS - LV V1 MEAN: 74.5 CM/SEC
BH CV ECHO MEAS - LV V1 VTI: 26.8 CM
BH CV ECHO MEAS - LVIDD: 4.2 CM
BH CV ECHO MEAS - LVIDS: 3.4 CM
BH CV ECHO MEAS - LVLD AP2: 7.2 CM
BH CV ECHO MEAS - LVLD AP4: 6.9 CM
BH CV ECHO MEAS - LVLS AP2: 5.6 CM
BH CV ECHO MEAS - LVLS AP4: 5.7 CM
BH CV ECHO MEAS - LVOT AREA (M): 3.1 CM^2
BH CV ECHO MEAS - LVOT AREA: 3.2 CM^2
BH CV ECHO MEAS - LVOT DIAM: 2 CM
BH CV ECHO MEAS - LVPWD: 1.5 CM
BH CV ECHO MEAS - MED PEAK E' VEL: 7.7 CM/SEC
BH CV ECHO MEAS - MV A DUR: 0.15 SEC
BH CV ECHO MEAS - MV A MAX VEL: 105 CM/SEC
BH CV ECHO MEAS - MV DEC SLOPE: 371.4 CM/SEC^2
BH CV ECHO MEAS - MV DEC TIME: 0.2 SEC
BH CV ECHO MEAS - MV E MAX VEL: 78.8 CM/SEC
BH CV ECHO MEAS - MV E/A: 0.75
BH CV ECHO MEAS - MV MAX PG: 5.5 MMHG
BH CV ECHO MEAS - MV MEAN PG: 2.2 MMHG
BH CV ECHO MEAS - MV P1/2T MAX VEL: 98.9 CM/SEC
BH CV ECHO MEAS - MV P1/2T: 78 MSEC
BH CV ECHO MEAS - MV V2 MAX: 117 CM/SEC
BH CV ECHO MEAS - MV V2 MEAN: 67.7 CM/SEC
BH CV ECHO MEAS - MV V2 VTI: 38.1 CM
BH CV ECHO MEAS - MVA P1/2T LCG: 2.2 CM^2
BH CV ECHO MEAS - MVA(P1/2T): 2.8 CM^2
BH CV ECHO MEAS - MVA(VTI): 2.2 CM^2
BH CV ECHO MEAS - PA ACC TIME: 0.09 SEC
BH CV ECHO MEAS - PA MAX PG: 2.1 MMHG
BH CV ECHO MEAS - PA PR(ACCEL): 38.6 MMHG
BH CV ECHO MEAS - PA V2 MAX: 71.8 CM/SEC
BH CV ECHO MEAS - PULM A REVS DUR: 0.14 SEC
BH CV ECHO MEAS - PULM A REVS VEL: 33.1 CM/SEC
BH CV ECHO MEAS - PULM DIAS VEL: 35.1 CM/SEC
BH CV ECHO MEAS - PULM S/D: 1.4
BH CV ECHO MEAS - PULM SYS VEL: 48.9 CM/SEC
BH CV ECHO MEAS - RAP SYSTOLE: 3 MMHG
BH CV ECHO MEAS - RVOT AREA: 4.9 CM^2
BH CV ECHO MEAS - RVOT DIAM: 2.5 CM
BH CV ECHO MEAS - RVSP: 13 MMHG
BH CV ECHO MEAS - SI(AO): 217.7 ML/M^2
BH CV ECHO MEAS - SI(CUBED): 19.6 ML/M^2
BH CV ECHO MEAS - SI(LVOT): 43.2 ML/M^2
BH CV ECHO MEAS - SI(MOD-SP2): 30.4 ML/M^2
BH CV ECHO MEAS - SI(MOD-SP4): 22.8 ML/M^2
BH CV ECHO MEAS - SI(TEICH): 17.5 ML/M^2
BH CV ECHO MEAS - SUP REN AO DIAM: 2.1 CM
BH CV ECHO MEAS - SV(AO): 429 ML
BH CV ECHO MEAS - SV(CUBED): 38.6 ML
BH CV ECHO MEAS - SV(LVOT): 85.1 ML
BH CV ECHO MEAS - SV(MOD-SP2): 60 ML
BH CV ECHO MEAS - SV(MOD-SP4): 45 ML
BH CV ECHO MEAS - SV(TEICH): 34.5 ML
BH CV ECHO MEAS - TAPSE (>1.6): 1.9 CM
BH CV ECHO MEAS - TR MAX VEL: 155.6 CM/SEC
BH CV ECHO MEASUREMENTS AVERAGE E/E' RATIO: 11.76
BH CV XLRA - RV BASE: 2.9 CM
BH CV XLRA - RV LENGTH: 7.6 CM
BH CV XLRA - RV MID: 3.3 CM
BH CV XLRA - TDI S': 10.1 CM/SEC
LEFT ATRIUM VOLUME INDEX: 27 ML/M2
LV EF 2D ECHO EST: 69 %
SINUS: 3.6 CM
STJ: 3.4 CM

## 2021-12-20 PROCEDURE — 93306 TTE W/DOPPLER COMPLETE: CPT | Performed by: INTERNAL MEDICINE

## 2021-12-20 PROCEDURE — 93306 TTE W/DOPPLER COMPLETE: CPT

## 2021-12-21 ENCOUNTER — LAB (OUTPATIENT)
Dept: LAB | Facility: HOSPITAL | Age: 71
End: 2021-12-21

## 2021-12-21 DIAGNOSIS — I10 ESSENTIAL HYPERTENSION: ICD-10-CM

## 2021-12-21 LAB
ANION GAP SERPL CALCULATED.3IONS-SCNC: 5.4 MMOL/L (ref 5–15)
BUN SERPL-MCNC: 21 MG/DL (ref 8–23)
BUN/CREAT SERPL: 10.8 (ref 7–25)
CALCIUM SPEC-SCNC: 9.4 MG/DL (ref 8.6–10.5)
CHLORIDE SERPL-SCNC: 105 MMOL/L (ref 98–107)
CO2 SERPL-SCNC: 26.6 MMOL/L (ref 22–29)
CREAT SERPL-MCNC: 1.95 MG/DL (ref 0.57–1)
GFR SERPL CREATININE-BSD FRML MDRD: 25 ML/MIN/1.73
GLUCOSE SERPL-MCNC: 112 MG/DL (ref 65–99)
MAGNESIUM SERPL-MCNC: 1.6 MG/DL (ref 1.6–2.4)
POTASSIUM SERPL-SCNC: 4.8 MMOL/L (ref 3.5–5.2)
SODIUM SERPL-SCNC: 137 MMOL/L (ref 136–145)

## 2021-12-21 PROCEDURE — 83735 ASSAY OF MAGNESIUM: CPT

## 2021-12-21 PROCEDURE — 80048 BASIC METABOLIC PNL TOTAL CA: CPT

## 2021-12-21 PROCEDURE — 36415 COLL VENOUS BLD VENIPUNCTURE: CPT

## 2021-12-22 ENCOUNTER — PATIENT OUTREACH (OUTPATIENT)
Dept: CASE MANAGEMENT | Facility: OTHER | Age: 71
End: 2021-12-22

## 2021-12-22 NOTE — OUTREACH NOTE
Ambulatory Case Management Note    Care Evaluation    Questions/Answers      Most Recent Value   Care Gaps Addressed --  [Reviewed podiatry need. Kellyn acknowledges need but not interested in scheduling at this time. ]   Other Patient Education/Resources  --  [Reviewed exercise plan. ]   Advanced Directives: Patient Has          Care Plan: Diabetes Management   Updates made since 12/22/2021 12:00 AM      Problem: PATIENT IS INACTIVE       Goal: Exercise at least 20 minutes per day       Task: Discuss barriers to activity with patient. Update SDOH. Completed 12/22/2021   Due Date: 12/20/2021   Responsible User: Shayla Coates, RN   Note:    No gym local and also very tired after gaining 22 pounds recently after medicine change.      Task: Develop exercise plan with patient Completed 12/22/2021   Due Date: 12/20/2021   Responsible User: Shayla Coates RN   Note:    Reviewed with patient the importance of scheduling regular cardio exercise.   Patient plans regular stretching and light walking.      Task: Explore community resources including walking groups, assistance programs, and home videos. Completed 12/22/2021   Responsible User: Shayla Coates RN   Note:    Patient states she's not ready to make a formal exercise plan at this time but has used a gym in the past and plans to walk and stretch presently.        Patient Outreach    Introduced self, explained ACM RN role and provided contact information. Spoke with patient regarding health and wellness. Patient has been visiting several physicians recently (nephrology, cardiology). Patient has also had an echo and a holter monitor placed. She is also being scheduled for a stress test. Patient has recently gained 22 pounds after a medication change and has lost some energy. She is doing some light walking and stretching but has not committed to an exercise plan at this time. [facility] also reviewed the possibility of a podiatry visit as well. Patient not interested at  this time due to the increase of all of her other physician appointment. Patient also interested in following up on her hearing loss. She started a process before COVID but has not completed it. [facility] recommended calling [facility] for a list of covered providers and discussing with Clive REED in February. Patient verbalized understanding. Follow up scheduled next month to review continued needs, exercise routine, and care gaps.    Shayla Coates RN  Ambulatory Case Management    12/22/2021, 14:13 EST

## 2021-12-27 RX ORDER — CLONIDINE HYDROCHLORIDE 0.1 MG/1
TABLET ORAL
Qty: 60 TABLET | Refills: 1 | Status: SHIPPED | OUTPATIENT
Start: 2021-12-27 | End: 2022-01-10 | Stop reason: SDUPTHER

## 2021-12-27 NOTE — TELEPHONE ENCOUNTER
Rx Refill Note  Requested Prescriptions     Pending Prescriptions Disp Refills   • cloNIDine (CATAPRES) 0.1 MG tablet [Pharmacy Med Name: CLONIDINE HCL 0.1 MG TABLET] 60 tablet 1     Sig: TAKE 1 TABLET BY MOUTH TWICE A DAY      Last office visit with prescribing clinician: 12/6/2021      Next office visit with prescribing clinician: 2/18/2022            Selvin Muller MA  12/27/21, 08:58 EST

## 2021-12-28 ENCOUNTER — TELEPHONE (OUTPATIENT)
Dept: FAMILY MEDICINE CLINIC | Facility: CLINIC | Age: 71
End: 2021-12-28

## 2021-12-30 ENCOUNTER — PATIENT ROUNDING (BHMG ONLY) (OUTPATIENT)
Dept: CARDIOLOGY | Facility: CLINIC | Age: 71
End: 2021-12-30

## 2021-12-31 NOTE — PROGRESS NOTES
December 30, 2021    Hello, may I speak with Mendy Woods?    My name is Karen     I am  with [facility]  [address]  [place] [address] [phone].    Before we get started may I verify your date of birth? 1950    I am calling to officially welcome you to our practice and ask about your recent visit. Is this a good time to talk? yes    Tell me about your visit with us. What things went well?  The office was organized and every person in the testing departments was informative and kind. Really excellent service from check in to check out.       We're always looking for ways to make our patients' experiences even better. Do you have recommendations on ways we may improve?  yes when scheduling testing a little more information on the test to alleviate the apprehension.     Overall were you satisfied with your first visit to our practice? yes       I appreciate you taking the time to speak with me today. Is there anything else I can do for you? no      Thank you, and have a great day.

## 2022-01-04 ENCOUNTER — TELEPHONE (OUTPATIENT)
Dept: FAMILY MEDICINE CLINIC | Facility: CLINIC | Age: 72
End: 2022-01-04

## 2022-01-04 ENCOUNTER — OFFICE VISIT (OUTPATIENT)
Dept: FAMILY MEDICINE CLINIC | Facility: CLINIC | Age: 72
End: 2022-01-04

## 2022-01-04 VITALS
BODY MASS INDEX: 42.21 KG/M2 | DIASTOLIC BLOOD PRESSURE: 80 MMHG | OXYGEN SATURATION: 99 % | SYSTOLIC BLOOD PRESSURE: 140 MMHG | HEIGHT: 61 IN | WEIGHT: 223.6 LBS | TEMPERATURE: 97.3 F | HEART RATE: 65 BPM | RESPIRATION RATE: 14 BRPM

## 2022-01-04 DIAGNOSIS — R43.2 LOSS OF TASTE: Primary | ICD-10-CM

## 2022-01-04 PROCEDURE — 99213 OFFICE O/P EST LOW 20 MIN: CPT | Performed by: NURSE PRACTITIONER

## 2022-01-04 NOTE — TELEPHONE ENCOUNTER
Caller: Mendy Woods    Relationship: Self    Best call back number: [phone]     What is the best time to reach you: ANY TIME    Who are you requesting to speak with (clinical staff, provider,  specific staff member): CLINICAL    What was the call regarding: PATIENT CALLED SAYING SHE WAS TOLD TO FAST FOR HER ULTRASOUND THIS Friday, BUT SHE IS CONCERNED ABOUT THAT DUE TO HER BLOOD SUGAR DROPPING.  SHE ALSO NOTED THAT HER NOSE IS DRIPPING, SHE IS SNEEZING ALL NIGHT, AND HER FEET ARE SWOLLEN. SHE SAID THIS HAS BEEN GOING ON FOR THE LAST WEEK AND SHE THINKS IT'S DUE TO A CHANGE IN HER MEDICATION. PATIENT ALSO NOTED SHE CAN'T TASTE. SHE WOULD LIKE TO SPEAK WITH SOMEBODY REGARDING THIS.     Do you require a callback: YES

## 2022-01-04 NOTE — PROGRESS NOTES
"Chief Complaint  Weight Check (pt states shes put on 12 pounds in last 2 weeks and is concern) and Med Management (pt was put on new medication states that its made her tongue numb and because of that has no taste)    Subjective          Mendy Woods presents to [facility]  Scheduled to have test done on Friday, renal US. Sugar this morning 58 at 2 am, jumped to 121 and then back to 68 by morning.    States during the day, sugar increases to 400 at nights.     Loss of taste in past week, progressive,       Objective   Vital Signs:   /80   Pulse 65   Temp 97.3 °F (36.3 °C) (Infrared)   Resp 14   Ht 154.9 cm (61\")   Wt 101 kg (223 lb 9.6 oz)   SpO2 99%   BMI 42.25 kg/m²     Physical Exam  Vitals reviewed.   Constitutional:       General: She is not in acute distress.     Appearance: Normal appearance. She is well-developed. She is not ill-appearing or diaphoretic.   HENT:      Head: Normocephalic and atraumatic.      Right Ear: Tympanic membrane and ear canal normal.      Left Ear: Tympanic membrane and ear canal normal.      Nose: Nose normal.      Mouth/Throat:      Mouth: Mucous membranes are moist.      Pharynx: Oropharynx is clear.   Eyes:      Conjunctiva/sclera: Conjunctivae normal.   Cardiovascular:      Rate and Rhythm: Normal rate and regular rhythm.      Heart sounds: Normal heart sounds. No murmur heard.  No friction rub. No gallop.    Pulmonary:      Effort: Pulmonary effort is normal. No respiratory distress.      Breath sounds: Normal breath sounds. No wheezing or rales.   Abdominal:      General: Bowel sounds are normal. There is no distension.      Palpations: Abdomen is soft.      Tenderness: There is no abdominal tenderness.   Musculoskeletal:      Cervical back: Neck supple.   Skin:     General: Skin is warm and dry.   Neurological:      Mental Status: She is alert and oriented to person, place, and time.        Result Review :               "   Assessment and Plan    Diagnoses and all orders for this visit:    1. Loss of taste (Primary)  -     Cancel: COVID-19,[facility] ROUTINE, NP/OP SWAB IN TRANSPORT MEDIA OR ESWAB 72 HR TAT - Swab, Nasopharynx; Future  -     COVID-19,[facility] ROUTINE, NP/OP SWAB IN TRANSPORT MEDIA OR ESWAB 72 HR TAT - Swab, Nasopharynx    Other orders  -     SARS-CoV-2, [facility] 2 DAY TAT - ,        Follow Up   No follow-ups on file.  Patient was given instructions and counseling regarding her condition or for health maintenance advice. Please see specific information pulled into the AVS if appropriate.     Loss of taste/smell, patient attributes this to medication, with rhinorrhea, recommend proceed with testing for covid  She will discuss hypertensive medications with cardiologist at upcoming appointment. She is concerned for weight gain, it is stable from her last visit, recommend repeat BMP prior to follow up with cardiology to evaluate kidney function.    Renal US, scheduled by nephrology, patient is to be fasting and this is causing concern as she becomes hypoglycemic at times at night. Recommend glucagon prn. She will follow up with nephrology as needed to discuss further.  Verbalized understanding.

## 2022-01-04 NOTE — TELEPHONE ENCOUNTER
Discussed recommendations in office, can you see what her concern is?  If she needs to fast, she can use glucagon, located over the counter, to manage her glucose if she has hypoglycemic episode

## 2022-01-04 NOTE — TELEPHONE ENCOUNTER
PATIENT CALLING IN REGARDS TO SPEAK TO REECE IN REGARDS TO A RENAL SCAN SHE MUST FAST FOR 7-8 HOURS AS A DIABETIC. PLEASE ADVISE THANK YOU!

## 2022-01-06 LAB
LABCORP SARS-COV-2, NAA 2 DAY TAT: NORMAL
SARS-COV-2 RNA RESP QL NAA+PROBE: NOT DETECTED

## 2022-01-07 ENCOUNTER — HOSPITAL ENCOUNTER (OUTPATIENT)
Dept: ULTRASOUND IMAGING | Facility: HOSPITAL | Age: 72
Discharge: HOME | End: 2022-01-07
Admitting: INTERNAL MEDICINE

## 2022-01-07 DIAGNOSIS — N18.30 STAGE 3 CHRONIC KIDNEY DISEASE, UNSPECIFIED WHETHER STAGE 3A OR 3B CKD: ICD-10-CM

## 2022-01-07 PROCEDURE — 76775 US EXAM ABDO BACK WALL LIM: CPT

## 2022-01-10 ENCOUNTER — HOSPITAL ENCOUNTER (EMERGENCY)
Facility: HOSPITAL | Age: 72
Discharge: HOME | End: 2022-01-10
Attending: EMERGENCY MEDICINE | Admitting: EMERGENCY MEDICINE

## 2022-01-10 ENCOUNTER — APPOINTMENT (OUTPATIENT)
Dept: GENERAL RADIOLOGY | Facility: HOSPITAL | Age: 72
End: 2022-01-10

## 2022-01-10 ENCOUNTER — TELEPHONE (OUTPATIENT)
Dept: FAMILY MEDICINE CLINIC | Facility: CLINIC | Age: 72
End: 2022-01-10

## 2022-01-10 VITALS
TEMPERATURE: 97.5 F | WEIGHT: 221.1 LBS | OXYGEN SATURATION: 97 % | RESPIRATION RATE: 20 BRPM | HEART RATE: 71 BPM | DIASTOLIC BLOOD PRESSURE: 94 MMHG | HEIGHT: 61 IN | BODY MASS INDEX: 41.74 KG/M2 | SYSTOLIC BLOOD PRESSURE: 168 MMHG

## 2022-01-10 DIAGNOSIS — I10 UNCONTROLLED HYPERTENSION: Primary | ICD-10-CM

## 2022-01-10 DIAGNOSIS — L28.2 PRURITIC RASH: ICD-10-CM

## 2022-01-10 LAB
ALBUMIN SERPL-MCNC: 4.4 G/DL (ref 3.5–5.2)
ALBUMIN/GLOB SERPL: 1.4 G/DL
ALP SERPL-CCNC: 92 U/L (ref 39–117)
ALT SERPL W P-5'-P-CCNC: 13 U/L (ref 1–33)
ANION GAP SERPL CALCULATED.3IONS-SCNC: 10.8 MMOL/L (ref 5–15)
AST SERPL-CCNC: 14 U/L (ref 1–32)
BACTERIA UR QL AUTO: ABNORMAL /HPF
BASOPHILS # BLD AUTO: 0 10*3/MM3 (ref 0–0.2)
BASOPHILS NFR BLD AUTO: 0 % (ref 0–1.5)
BILIRUB SERPL-MCNC: 0.4 MG/DL (ref 0–1.2)
BILIRUB UR QL STRIP: NEGATIVE
BUN SERPL-MCNC: 26 MG/DL (ref 8–23)
BUN/CREAT SERPL: 15.2 (ref 7–25)
CALCIUM SPEC-SCNC: 9.8 MG/DL (ref 8.6–10.5)
CHLORIDE SERPL-SCNC: 107 MMOL/L (ref 98–107)
CLARITY UR: CLEAR
CO2 SERPL-SCNC: 24.2 MMOL/L (ref 22–29)
COLOR UR: YELLOW
CREAT SERPL-MCNC: 1.71 MG/DL (ref 0.57–1)
DEPRECATED RDW RBC AUTO: 43.8 FL (ref 37–54)
EOSINOPHIL # BLD AUTO: 0.5 10*3/MM3 (ref 0–0.4)
EOSINOPHIL NFR BLD AUTO: 6.2 % (ref 0.3–6.2)
ERYTHROCYTE [DISTWIDTH] IN BLOOD BY AUTOMATED COUNT: 13.5 % (ref 12.3–15.4)
GFR SERPL CREATININE-BSD FRML MDRD: 29 ML/MIN/1.73
GLOBULIN UR ELPH-MCNC: 3.2 GM/DL
GLUCOSE SERPL-MCNC: 135 MG/DL (ref 65–99)
GLUCOSE UR STRIP-MCNC: NEGATIVE MG/DL
HCT VFR BLD AUTO: 40.3 % (ref 34–46.6)
HGB BLD-MCNC: 12.8 G/DL (ref 12–15.9)
HGB UR QL STRIP.AUTO: ABNORMAL
HOLD SPECIMEN: NORMAL
HOLD SPECIMEN: NORMAL
HYALINE CASTS UR QL AUTO: ABNORMAL /LPF
IMM GRANULOCYTES # BLD AUTO: 0.03 10*3/MM3 (ref 0–0.05)
IMM GRANULOCYTES NFR BLD AUTO: 0.4 % (ref 0–0.5)
KETONES UR QL STRIP: NEGATIVE
LEUKOCYTE ESTERASE UR QL STRIP.AUTO: ABNORMAL
LYMPHOCYTES # BLD AUTO: 1.94 10*3/MM3 (ref 0.7–3.1)
LYMPHOCYTES NFR BLD AUTO: 24.1 % (ref 19.6–45.3)
MCH RBC QN AUTO: 28.4 PG (ref 26.6–33)
MCHC RBC AUTO-ENTMCNC: 31.8 G/DL (ref 31.5–35.7)
MCV RBC AUTO: 89.4 FL (ref 79–97)
MONOCYTES # BLD AUTO: 0.56 10*3/MM3 (ref 0.1–0.9)
MONOCYTES NFR BLD AUTO: 7 % (ref 5–12)
NEUTROPHILS NFR BLD AUTO: 5.01 10*3/MM3 (ref 1.7–7)
NEUTROPHILS NFR BLD AUTO: 62.3 % (ref 42.7–76)
NITRITE UR QL STRIP: NEGATIVE
NRBC BLD AUTO-RTO: 0 /100 WBC (ref 0–0.2)
NT-PROBNP SERPL-MCNC: 1444 PG/ML (ref 0–900)
PH UR STRIP.AUTO: 5.5 [PH] (ref 5–8)
PLATELET # BLD AUTO: 220 10*3/MM3 (ref 140–450)
PMV BLD AUTO: 9.5 FL (ref 6–12)
POTASSIUM SERPL-SCNC: 4.7 MMOL/L (ref 3.5–5.2)
PROT SERPL-MCNC: 7.6 G/DL (ref 6–8.5)
PROT UR QL STRIP: ABNORMAL
QT INTERVAL: 365 MS
RBC # BLD AUTO: 4.51 10*6/MM3 (ref 3.77–5.28)
RBC # UR STRIP: ABNORMAL /HPF
REF LAB TEST METHOD: ABNORMAL
SODIUM SERPL-SCNC: 142 MMOL/L (ref 136–145)
SP GR UR STRIP: 1.01 (ref 1–1.03)
SQUAMOUS #/AREA URNS HPF: ABNORMAL /HPF
T4 FREE SERPL-MCNC: 1.6 NG/DL (ref 0.93–1.7)
TROPONIN T SERPL-MCNC: 0.01 NG/ML (ref 0–0.03)
TSH SERPL DL<=0.05 MIU/L-ACNC: 0.92 UIU/ML (ref 0.27–4.2)
UROBILINOGEN UR QL STRIP: ABNORMAL
WBC # UR STRIP: ABNORMAL /HPF
WBC NRBC COR # BLD: 8.04 10*3/MM3 (ref 3.4–10.8)
WHOLE BLOOD HOLD SPECIMEN: NORMAL
WHOLE BLOOD HOLD SPECIMEN: NORMAL

## 2022-01-10 PROCEDURE — 93010 ELECTROCARDIOGRAM REPORT: CPT | Performed by: INTERNAL MEDICINE

## 2022-01-10 PROCEDURE — 93005 ELECTROCARDIOGRAM TRACING: CPT | Performed by: EMERGENCY MEDICINE

## 2022-01-10 PROCEDURE — 71045 X-RAY EXAM CHEST 1 VIEW: CPT

## 2022-01-10 PROCEDURE — 84443 ASSAY THYROID STIM HORMONE: CPT | Performed by: EMERGENCY MEDICINE

## 2022-01-10 PROCEDURE — 85025 COMPLETE CBC W/AUTO DIFF WBC: CPT | Performed by: EMERGENCY MEDICINE

## 2022-01-10 PROCEDURE — 83880 ASSAY OF NATRIURETIC PEPTIDE: CPT | Performed by: EMERGENCY MEDICINE

## 2022-01-10 PROCEDURE — 80053 COMPREHEN METABOLIC PANEL: CPT | Performed by: EMERGENCY MEDICINE

## 2022-01-10 PROCEDURE — 84439 ASSAY OF FREE THYROXINE: CPT | Performed by: EMERGENCY MEDICINE

## 2022-01-10 PROCEDURE — 84484 ASSAY OF TROPONIN QUANT: CPT | Performed by: EMERGENCY MEDICINE

## 2022-01-10 PROCEDURE — 81001 URINALYSIS AUTO W/SCOPE: CPT | Performed by: EMERGENCY MEDICINE

## 2022-01-10 PROCEDURE — 99283 EMERGENCY DEPT VISIT LOW MDM: CPT

## 2022-01-10 PROCEDURE — 96374 THER/PROPH/DIAG INJ IV PUSH: CPT

## 2022-01-10 PROCEDURE — 36415 COLL VENOUS BLD VENIPUNCTURE: CPT

## 2022-01-10 RX ORDER — LABETALOL HYDROCHLORIDE 5 MG/ML
10 INJECTION, SOLUTION INTRAVENOUS ONCE
Status: COMPLETED | OUTPATIENT
Start: 2022-01-10 | End: 2022-01-10

## 2022-01-10 RX ORDER — SODIUM CHLORIDE 0.9 % (FLUSH) 0.9 %
10 SYRINGE (ML) INJECTION AS NEEDED
Status: DISCONTINUED | OUTPATIENT
Start: 2022-01-10 | End: 2022-01-10 | Stop reason: HOSPADM

## 2022-01-10 RX ORDER — CLONIDINE HYDROCHLORIDE 0.1 MG/1
0.1 TABLET ORAL 2 TIMES DAILY
Qty: 60 TABLET | Refills: 1 | Status: SHIPPED | OUTPATIENT
Start: 2022-01-10 | End: 2022-02-28

## 2022-01-10 RX ADMIN — LABETALOL HYDROCHLORIDE 10 MG: 5 INJECTION, SOLUTION INTRAVENOUS at 17:19

## 2022-01-10 NOTE — TELEPHONE ENCOUNTER
Patient has been advised to go to the ER by the office and UC. She is refusing to go. She has been made aware of the risk by not going. I have scheduled her an appt with Felicia for Tuesday afternoon.

## 2022-01-10 NOTE — DISCHARGE INSTRUCTIONS
Continue your home medications.  Increase your losartan to 2 pills in the morning and 1 pill in the evening for a total of 3 pills a day.  Follow-up with your cardiologist in 1 week.  Please call tomorrow for an appointment.  Please return to the emergency department if symptoms worsen.  Use the steroid cream on the rash on your left leg for several days as directed.

## 2022-01-10 NOTE — ED PROVIDER NOTES
EMERGENCY DEPARTMENT ENCOUNTER  I wore full protective equipment throughout this patient encounter including a N95 mask, eye shield, gown and gloves. Hand hygiene was performed before donning protective equipment and after removal when leaving the room.    Room Number:  27/27  Date of encounter:  1/10/2022  PCP: Felicia Duffy APRN    HPI:  Context: Mendy Woods is a 71 y.o. female who presents to the ED c/o chief complaint of uncontrolled hypertension.  Patient and her  states that this is been going on for at least 3 to 4 months.  Patient states that she is seeing a nephrologist and cardiologist about it.  Patient states that she is still taking her clonidine and her last dose of clonidine was this morning.  She usually takes it once in the morning once in the evening.  She is seeing her cardiologist and has had a Holter monitor.  She recently had a renal ultrasound as ordered by her nephrologist and was normal.  She had noted 3 weeks of loss of sense of taste and sneezing and a Covid test was performed last week which patient states was negative.  Patient states that she went to urgent care on Saturday and she was advised to come to the emergency department.  She did not want to at that time.  She did contact her primary physician about her uncontrolled hypertension and a rash on her thighs that has since gone away and she was advised to continue her clonidine and come to the ER if her blood pressure stays elevated.  Patient denies chest pain, headache, blurry vision, dizziness or weakness.  She denies abdominal pain.    MEDICAL HISTORY REVIEW  Reviewed in [facility].  Patient was seen by Dr. Fuchs, cardiology, on December 13, 2021.  She was felt to have essential hypertension with stage III chronic kidney disease.  He placed her on losartan and Coreg.  They are hoping to eventually wean her off clonidine and start her on a diuretic, pending Dr. Holley's recommendations.  She did have a  negative SARS-CoV-2 assay on January 4, 2022.    PAST MEDICAL HISTORY  Active Ambulatory Problems     Diagnosis Date Noted   • B12 deficiency 08/24/2016   • Type 1 diabetes mellitus with nephropathy ([facility]) 08/24/2016   • Pure hypercholesterolemia 08/24/2016   • Hypothyroidism 08/24/2016   • Renal insufficiency, mild 08/24/2016   • Chronic allergic rhinitis 11/13/2018   • Burning tongue syndrome 04/17/2019   • Gastroesophageal reflux disease without esophagitis 11/15/2019   • Vitamin D deficiency 07/21/2021   • Hyperlipidemia 07/21/2021   • Essential hypertension 07/21/2021   • Pernicious anemia 07/31/2021   • 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) ([facility]) 07/31/2021   • Benign hypertension with CKD (chronic kidney disease) stage III ([facility]) 12/13/2021   • Morbid obesity with BMI of 40.0-44.9, adult ([facility]) 12/13/2021     Resolved Ambulatory Problems     Diagnosis Date Noted   • No Resolved Ambulatory Problems     Past Medical History:   Diagnosis Date   • Abnormal blood chemistry test    • Allergic rhinitis    • Anemia, B12 deficiency    • Arthritis    • Cutaneous lupus erythematosus    • Dermatitis    • Disorder of kidney and ureter    • Edema of optic nerve    • Herpes zoster    • Hypertension    • Laryngitis    • Low back pain    • Renal failure    • Sarcoidosis    • Skin rash    • Tonsillitis    • Type 1 diabetes mellitus ([facility])    • Type 2 diabetes mellitus, controlled ([facility])    • Vitamin B deficiency        PAST SURGICAL HISTORY  Past Surgical History:   Procedure Laterality Date   • COLONOSCOPY  2007    Normal.   • LYMPH NODE BIOPSY      sarcoid       FAMILY HISTORY  Family History   Problem Relation Age of Onset   • Thyroid disease Mother    • Diabetes Father    • Heart attack Father    • Hashimoto's thyroiditis Sister    • Hashimoto's thyroiditis Daughter    • Diabetes Paternal Grandmother    • Hashimoto's thyroiditis Sister    • Hashimoto's thyroiditis Daughter    • Cancer Paternal Grandfather         SOCIAL HISTORY  Social History     Socioeconomic History   • Marital status:    Tobacco Use   • Smoking status: Never Smoker   • Smokeless tobacco: Never Used   • Tobacco comment: occas caffeine use    Vaping Use   • Vaping Use: Never used   Substance and Sexual Activity   • Alcohol use: Yes     Comment: rarely   • Drug use: No   • Sexual activity: Yes     Partners: Male     Comment:        ALLERGIES  Sulfa antibiotics and Sulfamethazine    The patient's allergies have been reviewed    REVIEW OF SYSTEMS  All systems reviewed and negative except for those discussed in HPI.     PHYSICAL EXAM  I have reviewed the triage vital signs and nursing notes.  ED Triage Vitals   Temp Heart Rate Resp BP SpO2   01/10/22 1525 01/10/22 1525 01/10/22 1525 01/10/22 1527 01/10/22 1525   97.5 °F (36.4 °C) 116 20 (!) 230/110 95 %      Temp src Heart Rate Source Patient Position BP Location FiO2 (%)   01/10/22 1525 -- 01/10/22 1527 -- --   Tympanic  Standing         General: Mild distress and anxious appearing  HENT: NCAT, PERRL, Nares patent.  Eyes: no scleral icterus.  Neck: trachea midline, no ROM limitations.  CV: Tachycardic without murmur  Respiratory: normal effort, occasional rhonchi at the bases.  Abdomen: soft, nondistended, NTTP, no rebound tenderness, no guarding or rigidity.  Musculoskeletal: no deformity.  No edema or calf tenderness.  Neuro: alert, moves all extremities, follows commands.  Skin: warm, dry.    LAB RESULTS  Recent Results (from the past 24 hour(s))   Green Top (Gel)    Collection Time: 01/10/22  4:40 PM   Result Value Ref Range    Extra Tube Hold for add-ons.    Lavender Top    Collection Time: 01/10/22  4:40 PM   Result Value Ref Range    Extra Tube hold for add-on    Gold Top - SST    Collection Time: 01/10/22  4:40 PM   Result Value Ref Range    Extra Tube Hold for add-ons.    Light Blue Top    Collection Time: 01/10/22  4:40 PM   Result Value Ref Range    Extra Tube hold for add-on     Comprehensive Metabolic Panel    Collection Time: 01/10/22  4:40 PM    Specimen: Blood   Result Value Ref Range    Glucose 135 (H) 65 - 99 mg/dL    BUN 26 (H) 8 - 23 mg/dL    Creatinine 1.71 (H) 0.57 - 1.00 mg/dL    Sodium 142 136 - 145 mmol/L    Potassium 4.7 3.5 - 5.2 mmol/L    Chloride 107 98 - 107 mmol/L    CO2 24.2 22.0 - 29.0 mmol/L    Calcium 9.8 8.6 - 10.5 mg/dL    Total Protein 7.6 6.0 - 8.5 g/dL    Albumin 4.40 3.50 - 5.20 g/dL    ALT (SGPT) 13 1 - 33 U/L    AST (SGOT) 14 1 - 32 U/L    Alkaline Phosphatase 92 39 - 117 U/L    Total Bilirubin 0.4 0.0 - 1.2 mg/dL    eGFR Non African Amer 29 (L) >60 mL/min/1.73    Globulin 3.2 gm/dL    A/G Ratio 1.4 g/dL    BUN/Creatinine Ratio 15.2 7.0 - 25.0    Anion Gap 10.8 5.0 - 15.0 mmol/L   TSH    Collection Time: 01/10/22  4:40 PM    Specimen: Blood   Result Value Ref Range    TSH 0.918 0.270 - 4.200 uIU/mL   T4, Free    Collection Time: 01/10/22  4:40 PM    Specimen: Blood   Result Value Ref Range    Free T4 1.60 0.93 - 1.70 ng/dL   Troponin    Collection Time: 01/10/22  4:40 PM    Specimen: Blood   Result Value Ref Range    Troponin T 0.013 0.000 - 0.030 ng/mL   BNP    Collection Time: 01/10/22  4:40 PM    Specimen: Blood   Result Value Ref Range    proBNP 1,444.0 (H) 0.0 - 900.0 pg/mL   CBC Auto Differential    Collection Time: 01/10/22  4:40 PM    Specimen: Blood   Result Value Ref Range    WBC 8.04 3.40 - 10.80 10*3/mm3    RBC 4.51 3.77 - 5.28 10*6/mm3    Hemoglobin 12.8 12.0 - 15.9 g/dL    Hematocrit 40.3 34.0 - 46.6 %    MCV 89.4 79.0 - 97.0 fL    MCH 28.4 26.6 - 33.0 pg    MCHC 31.8 31.5 - 35.7 g/dL    RDW 13.5 12.3 - 15.4 %    RDW-SD 43.8 37.0 - 54.0 fl    MPV 9.5 6.0 - 12.0 fL    Platelets 220 140 - 450 10*3/mm3    Neutrophil % 62.3 42.7 - 76.0 %    Lymphocyte % 24.1 19.6 - 45.3 %    Monocyte % 7.0 5.0 - 12.0 %    Eosinophil % 6.2 0.3 - 6.2 %    Basophil % 0.0 0.0 - 1.5 %    Immature Grans % 0.4 0.0 - 0.5 %    Neutrophils, Absolute 5.01 1.70 - 7.00 10*3/mm3     Lymphocytes, Absolute 1.94 0.70 - 3.10 10*3/mm3    Monocytes, Absolute 0.56 0.10 - 0.90 10*3/mm3    Eosinophils, Absolute 0.50 (H) 0.00 - 0.40 10*3/mm3    Basophils, Absolute 0.00 0.00 - 0.20 10*3/mm3    Immature Grans, Absolute 0.03 0.00 - 0.05 10*3/mm3    nRBC 0.0 0.0 - 0.2 /100 WBC   Urinalysis With Microscopic If Indicated (No Culture) - Urine, Clean Catch    Collection Time: 01/10/22  5:01 PM    Specimen: Urine, Clean Catch   Result Value Ref Range    Color, UA Yellow Yellow, Straw    Appearance, UA Clear Clear    pH, UA 5.5 5.0 - 8.0    Specific Gravity, UA 1.006 1.005 - 1.030    Glucose, UA Negative Negative    Ketones, UA Negative Negative    Bilirubin, UA Negative Negative    Blood, UA Small (1+) (A) Negative    Protein,  mg/dL (2+) (A) Negative    Leuk Esterase, UA Trace (A) Negative    Nitrite, UA Negative Negative    Urobilinogen, UA 0.2 E.U./dL 0.2 - 1.0 E.U./dL   Urinalysis, Microscopic Only - Urine, Clean Catch    Collection Time: 01/10/22  5:01 PM    Specimen: Urine, Clean Catch   Result Value Ref Range    RBC, UA 0-2 None Seen, 0-2 /HPF    WBC, UA 6-12 (A) None Seen, 0-2 /HPF    Bacteria, UA 4+ (A) None Seen /HPF    Squamous Epithelial Cells, UA 0-2 None Seen, 0-2 /HPF    Hyaline Casts, UA 0-2 None Seen /LPF    Methodology Automated Microscopy    ECG 12 Lead    Collection Time: 01/10/22  5:03 PM   Result Value Ref Range    QT Interval 365 ms       I ordered the above labs and reviewed the results.    RADIOLOGY  XR Chest 1 View    Result Date: 1/10/2022  XR CHEST 1 VW-  HISTORY: Female who is 71 years-old,  hypertension, congestive heart failure  TECHNIQUE: Frontal view of the chest  COMPARISON: None available  FINDINGS: The heart size is borderline. Pulmonary vasculature is unremarkable. Aorta is tortuous. No focal pulmonary consolidation, pleural effusion, or pneumothorax. No acute osseous process.      No evidence for acute pulmonary process. Follow-up as clinical indications persist.   This report was finalized on 1/10/2022 5:57 PM by Dr. Landon Jaramillo M.D.        I ordered the above noted radiological studies. I reviewed the images and results. I agree with the radiologist interpretation.    PROCEDURES  Procedures  EKG          EKG time: 1703  Rhythm/Rate: Normal sinus rhythm, rate 91  P waves and DE: First-degree AV block  QRS, axis: normal   ST and T waves: normal     Interpreted Contemporaneously by me, independently viewed  No previous EKG    MEDICATIONS GIVEN IN ER  Medications   labetalol (NORMODYNE,TRANDATE) injection 10 mg (10 mg Intravenous Given 1/10/22 1719)       PROGRESS, DATA ANALYSIS, CONSULTS, AND MEDICAL DECISION MAKING  A complete history and physical exam have been performed.  All available laboratory and imaging results have been reviewed by myself prior to disposition.    MDM  After the initial H&P, I discussed pertinent information from history and physical exam with patient/family.  Discussed differential diagnosis.  Discussed plan for ED evaluation/work-up/treatment.  All questions answered.  Patient/family is agreeable with plan.  ED Course as of 01/10/22 2134   Mon Cy 10, 2022   1703 Patient presents with uncontrolled hypertension and chronic kidney disease.  Patient's blood pressure in triage was 230/110.  We are rechecking it here.  The recheck shows a blood pressure of 214/111.  I will try a beta-blocker first.   [DE]   1801 Patient's chest x-ray does not show CHF.  Her kidney function is actually better today.  Her current blood pressure is 175/94. [DE]   1803 WBC: 8.04 [DE]   1803 Hemoglobin: 12.8 [DE]   1803 Creatinine(!): 1.71 [DE]   1803 BUN(!): 26 [DE]   1803 Potassium: 4.7 [DE]   1803 Sodium: 142 [DE]   1803 Protein, UA(!): 100 mg/dL (2+) [DE]   1803 Patient's creatinine was 1.92 3 weeks ago. [DE]   1810 I updated patient on the results of her blood work and chest x-ray.  She is happy that we are consulting her cardiologist.  She also is mentioning a  pruritic rash that developed on her left thigh and leg that started several days ago.  On her skin exam, patient has a pruritic circular rash that is patchy on her medial left thigh and medial left calf.  Is not tender to palpation nor is it erythematous.  There is no palpable cords and she does not have calf tenderness.  I suspect that patient may have a mild vasculitis. [DE]   1819 I discussed the case with Dr. Lobato, cardiology.  He recommends increasing patient's losartan from 25 mg a day to 50 mg a day.  She needs to watch her salt intake and to follow-up with her cardiologist in 1 week. [DE]   1849 We went over patient's medications.  Patient is actually on losartan, 50 mg.  She has been taking it as 1 pill twice a day for a total of 50 mg.  We will increase patient's losartan to 2 pills in the morning and 1 pill in the evening for a total of 75 mg a day. [DE]      ED Course User Index  [DE] Harish Lima MD       AS OF 21:34 EST VITALS:    BP - 168/94  HR - 71  TEMP - 97.5 °F (36.4 °C) (Tympanic)  O2 SATS - 97%    DIAGNOSIS  Final diagnoses:   Uncontrolled hypertension   Pruritic rash         DISPOSITION    DISCHARGE    Patient discharged in stable condition.    Reviewed implications of results, diagnosis, meds, responsibility to follow up, warning signs and symptoms of possible worsening, potential complications and reasons to return to ER.    Patient/Family voiced understanding of above instructions.    Discussed plan for discharge, as there is no emergent indication for admission. Patient referred to primary care provider for BP management due to today's BP. Pt/family is agreeable and understands need for follow up and repeat testing.  Pt is aware that discharge does not mean that nothing is wrong but it indicates no emergency is present that requires admission and they must continue care with follow-up as given below or physician of their choice.     FOLLOW-UP  Yordan Fuchs MD  [address] [facility]  [facility]  [address]  [place] [id number] [phone]    Schedule an appointment as soon as possible for a visit            Medication List      New Prescriptions    triamcinolone 0.1 % ointment  Commonly known as: KENALOG  Apply 1 application topically to the appropriate area as directed 2 (Two) Times a Day.           Where to Get Your Medications      These medications were sent to [facility]/pharmacy #[id number] - [place], [place] - [address] AT [facility] - [phone] [facility] [phone]   [address], [place] [id number]    Phone: [phone]   · triamcinolone 0.1 % ointment          Harish Lima MD  01/10/22 5268

## 2022-01-11 ENCOUNTER — OFFICE VISIT (OUTPATIENT)
Dept: FAMILY MEDICINE CLINIC | Facility: CLINIC | Age: 72
End: 2022-01-11

## 2022-01-11 VITALS
OXYGEN SATURATION: 97 % | WEIGHT: 220.9 LBS | SYSTOLIC BLOOD PRESSURE: 152 MMHG | RESPIRATION RATE: 18 BRPM | DIASTOLIC BLOOD PRESSURE: 90 MMHG | TEMPERATURE: 97.7 F | BODY MASS INDEX: 41.71 KG/M2 | HEART RATE: 78 BPM | HEIGHT: 61 IN

## 2022-01-11 DIAGNOSIS — E11.22 TYPE 2 DIABETES MELLITUS WITH STAGE 3 CHRONIC KIDNEY DISEASE, WITHOUT LONG-TERM CURRENT USE OF INSULIN, UNSPECIFIED WHETHER STAGE 3A OR 3B CKD: ICD-10-CM

## 2022-01-11 DIAGNOSIS — N18.30 TYPE 2 DIABETES MELLITUS WITH STAGE 3 CHRONIC KIDNEY DISEASE, WITHOUT LONG-TERM CURRENT USE OF INSULIN, UNSPECIFIED WHETHER STAGE 3A OR 3B CKD: ICD-10-CM

## 2022-01-11 DIAGNOSIS — N18.30 STAGE 3 CHRONIC KIDNEY DISEASE, UNSPECIFIED WHETHER STAGE 3A OR 3B CKD: ICD-10-CM

## 2022-01-11 DIAGNOSIS — I10 ESSENTIAL HYPERTENSION: Primary | ICD-10-CM

## 2022-01-11 PROCEDURE — 99214 OFFICE O/P EST MOD 30 MIN: CPT | Performed by: NURSE PRACTITIONER

## 2022-01-11 NOTE — PROGRESS NOTES
"Chief Complaint  Hypertension (ER FUP MED ISSUES WT GAIN SKIN ISSUES HTN FUP EDEMA FEET ), Medication Reaction, Hyperlipidemia, Diabetes, and Chronic Kidney Disease    Subjective          Mendy Woods presents to [facility]  ER follow up, hypertension and rash. She reported To  on 01/08/0222 and was immediately referred to ER for hypertension. She refused to go and contacted the office who urged her to proceed to ER a few days later due to uncontrolled hypertension and possible medication reaction. She at that time did proceed to ER and was evaluated. Her BP was elevated, she was treated while in the ER and sent home to follow up with PCP for bp control. She has completed an echo and holter, studies pending. She was started on losartan at her initial visit with cardiology and     Hypertension  This is a chronic problem. The current episode started more than 1 year ago. The problem has been waxing and waning since onset. The problem is uncontrolled. Pertinent negatives include no anxiety, malaise/fatigue, orthopnea, palpitations, peripheral edema or PND. There are no associated agents to hypertension. Risk factors for coronary artery disease include diabetes mellitus. Current antihypertension treatment includes calcium channel blockers, central alpha agonists, beta blockers and ACE inhibitors. The current treatment provides moderate improvement. Compliance problems include medication side effects.        Objective   Vital Signs:   /90 (BP Location: Left arm, Patient Position: Sitting, Cuff Size: Adult)   Pulse 78   Temp 97.7 °F (36.5 °C) (Temporal)   Resp 18   Ht 154.9 cm (61\")   Wt 100 kg (220 lb 14.4 oz)   SpO2 97%   BMI 41.74 kg/m²     Physical Exam  Vitals reviewed.   Constitutional:       Appearance: Normal appearance.   HENT:      Right Ear: Tympanic membrane and ear canal normal.      Left Ear: Tympanic membrane and ear canal normal.      Mouth/Throat:      " Mouth: Mucous membranes are moist.   Eyes:      Conjunctiva/sclera: Conjunctivae normal.      Pupils: Pupils are equal, round, and reactive to light.   Cardiovascular:      Rate and Rhythm: Normal rate and regular rhythm.      Heart sounds: No murmur heard.  No friction rub. No gallop.    Pulmonary:      Effort: Pulmonary effort is normal. No respiratory distress.      Breath sounds: Normal breath sounds. No wheezing, rhonchi or rales.   Skin:     General: Skin is warm and dry.   Neurological:      Mental Status: She is alert and oriented to person, place, and time.   Psychiatric:         Mood and Affect: Mood normal.        Result Review :                 Assessment and Plan    Diagnoses and all orders for this visit:    1. Essential hypertension (Primary)    2. Stage 3 chronic kidney disease, unspecified whether stage 3a or 3b CKD (HCC)    3. Type 2 diabetes mellitus with stage 3 chronic kidney disease, without long-term current use of insulin, unspecified whether stage 3a or 3b CKD (HCC)      I spent 35 minutes caring for Mendy on this date of service. This time includes time spent by me in the following activities:preparing for the visit, reviewing tests, performing a medically appropriate examination and/or evaluation , counseling and educating the patient/family/caregiver and documenting information in the medical record  Follow Up   No follow-ups on file.  Patient was given instructions and counseling regarding her condition or for health maintenance advice. Please see specific information pulled into the AVS if appropriate.     BP is improved today, still not at goal, would benefit from diuretic, however given kidney function will be managed by cardiology and nephrology  She is a complicated case, she started on clonidine for prn use, however continued, question rebound hypertension at times, may need to increase dose to TID dosing until other medications are tapered per cardiology/nephrology, at this time,  will continue current medication.     Runny nose/loss of taste, unknown that this is a medication side effect. Her covid test was negative. She has been prescribed losartan previously and tolerated it well, coreg is a newer medication, however she did tolerate metoprolol without symptoms. Recommend to continue current medication at this time and monitor symptoms.     Diabetes, brittle, sugars are waxing and waning, previously improved with decreased oral medication and insulin use only. She is having low sugars at night requiring glucose, no hospitalizations,

## 2022-01-19 ENCOUNTER — PATIENT OUTREACH (OUTPATIENT)
Dept: CASE MANAGEMENT | Facility: OTHER | Age: 72
End: 2022-01-19

## 2022-01-19 ENCOUNTER — APPOINTMENT (OUTPATIENT)
Dept: CARDIOLOGY | Facility: HOSPITAL | Age: 72
End: 2022-01-19

## 2022-01-19 NOTE — OUTREACH NOTE
Ambulatory Case Management Note    Patient Outreach    Introduced self, explained ACM RN role and provided contact information. Spoke with patient regarding health and wellness. Patient was able to complete US of kidneys and bladder at [facility] without fasting. She discussed barriers with Clive [facility]. Patient actively uses MyChart to stay up to date with medical tests, results, and appointments. Patient working to adjust diet for kidney health. ACM sending information regarding DASH diet to assist with lowering blood pressure. Patient adjusted medications after ED visit for HTN and current BP now 140/80. Patient also working on transitioning to a new Endocrinologist. Follow up on diet adjustments and further needs scheduled in 2 weeks.     There are no recently modified care plans to display for this patient.      Shayla Coates RN  Ambulatory Case Management    1/19/2022, 14:51 EST

## 2022-01-19 NOTE — PATIENT INSTRUCTIONS
"  https://www.nhlbi.nih.gov/files/docs/public/heart/dash_brief.pdf\">   DASH Eating Plan  DASH stands for Dietary Approaches to Stop Hypertension. The DASH eating plan is a healthy eating plan that has been shown to:  · Reduce high blood pressure (hypertension).  · Reduce your risk for type 2 diabetes, heart disease, and stroke.  · Help with weight loss.  What are tips for following this plan?  Reading food labels  · Check food labels for the amount of salt (sodium) per serving. Choose foods with less than 5 percent of the Daily Value of sodium. Generally, foods with less than 300 milligrams (mg) of sodium per serving fit into this eating plan.  · To find whole grains, look for the word \"whole\" as the first word in the ingredient list.  Shopping  · Buy products labeled as \"low-sodium\" or \"no salt added.\"  · Buy fresh foods. Avoid canned foods and pre-made or frozen meals.  Cooking  · Avoid adding salt when cooking. Use salt-free seasonings or herbs instead of table salt or sea salt. Check with your health care provider or pharmacist before using salt substitutes.  · Do not mackenzie foods. Cook foods using healthy methods such as baking, boiling, grilling, roasting, and broiling instead.  · Cook with heart-healthy oils, such as olive, canola, avocado, soybean, or sunflower oil.  Meal planning    · Eat a balanced diet that includes:  ? 4 or more servings of fruits and 4 or more servings of vegetables each day. Try to fill one-half of your plate with fruits and vegetables.  ? 6-8 servings of whole grains each day.  ? Less than 6 oz (170 g) of lean meat, poultry, or fish each day. A 3-oz (85-g) serving of meat is about the same size as a deck of cards. One egg equals 1 oz (28 g).  ? 2-3 servings of low-fat dairy each day. One serving is 1 cup (237 mL).  ? 1 serving of nuts, seeds, or beans 5 times each week.  ? 2-3 servings of heart-healthy fats. Healthy fats called omega-3 fatty acids are found in foods such as walnuts, " flaxseeds, fortified milks, and eggs. These fats are also found in cold-water fish, such as sardines, salmon, and mackerel.  · Limit how much you eat of:  ? Canned or prepackaged foods.  ? Food that is high in trans fat, such as some fried foods.  ? Food that is high in saturated fat, such as fatty meat.  ? Desserts and other sweets, sugary drinks, and other foods with added sugar.  ? Full-fat dairy products.  · Do not salt foods before eating.  · Do not eat more than 4 egg yolks a week.  · Try to eat at least 2 vegetarian meals a week.  · Eat more home-cooked food and less restaurant, buffet, and fast food.    Lifestyle  · When eating at a restaurant, ask that your food be prepared with less salt or no salt, if possible.  · If you drink alcohol:  ? Limit how much you use to:  § 0-1 drink a day for women who are not pregnant.  § 0-2 drinks a day for men.  ? Be aware of how much alcohol is in your drink. In the [place] one drink equals one 12 oz bottle of beer (355 mL), one 5 oz glass of wine (148 mL), or one 1½ oz glass of hard liquor (44 mL).  General information  · Avoid eating more than 2,300 mg of salt a day. If you have hypertension, you may need to reduce your sodium intake to 1,500 mg a day.  · Work with your health care provider to maintain a healthy body weight or to lose weight. Ask what an ideal weight is for you.  · Get at least 30 minutes of exercise that causes your heart to beat faster (aerobic exercise) most days of the week. Activities may include walking, swimming, or biking.  · Work with your health care provider or dietitian to adjust your eating plan to your individual calorie needs.  What foods should I eat?  Fruits  All fresh, dried, or frozen fruit. Canned fruit in natural juice (without added sugar).  Vegetables  Fresh or frozen vegetables (raw, steamed, roasted, or grilled). Low-sodium or reduced-sodium tomato and vegetable juice. Low-sodium or reduced-sodium tomato sauce and tomato paste.  Low-sodium or reduced-sodium canned vegetables.  Grains  Whole-grain or whole-wheat bread. Whole-grain or whole-wheat pasta. Brown rice. Oatmeal. Quinoa. Bulgur. Whole-grain and low-sodium cereals. Praveena bread. Low-fat, low-sodium crackers. Whole-wheat flour tortillas.  Meats and other proteins  Skinless chicken or turkey. Ground chicken or turkey. Pork with fat trimmed off. Fish and seafood. Egg whites. Dried beans, peas, or lentils. Unsalted nuts, nut butters, and seeds. Unsalted canned beans. Lean cuts of beef with fat trimmed off. Low-sodium, lean precooked or cured meat, such as sausages or meat loaves.  Dairy  Low-fat (1%) or fat-free (skim) milk. Reduced-fat, low-fat, or fat-free cheeses. Nonfat, low-sodium ricotta or cottage cheese. Low-fat or nonfat yogurt. Low-fat, low-sodium cheese.  Fats and oils  Soft margarine without trans fats. Vegetable oil. Reduced-fat, low-fat, or light mayonnaise and salad dressings (reduced-sodium). Canola, safflower, olive, avocado, soybean, and sunflower oils. Avocado.  Seasonings and condiments  Herbs. Spices. Seasoning mixes without salt.  Other foods  Unsalted popcorn and pretzels. Fat-free sweets.  The items listed above may not be a complete list of foods and beverages you can eat. Contact a dietitian for more information.  What foods should I avoid?  Fruits  Canned fruit in a light or heavy syrup. Fried fruit. Fruit in cream or butter sauce.  Vegetables  Creamed or fried vegetables. Vegetables in a cheese sauce. Regular canned vegetables (not low-sodium or reduced-sodium). Regular canned tomato sauce and paste (not low-sodium or reduced-sodium). Regular tomato and vegetable juice (not low-sodium or reduced-sodium). Pickles. Olives.  Grains  Baked goods made with fat, such as croissants, muffins, or some breads. Dry pasta or rice meal packs.  Meats and other proteins  Fatty cuts of meat. Ribs. Fried meat. Lin. Bologna, salami, and other precooked or cured meats, such as  sausages or meat loaves. Fat from the back of a pig (fatback). Bratwurst. Salted nuts and seeds. Canned beans with added salt. Canned or smoked fish. Whole eggs or egg yolks. Chicken or turkey with skin.  Dairy  Whole or 2% milk, cream, and half-and-half. Whole or full-fat cream cheese. Whole-fat or sweetened yogurt. Full-fat cheese. Nondairy creamers. Whipped toppings. Processed cheese and cheese spreads.  Fats and oils  Butter. Stick margarine. Lard. Shortening. Ghee. Lin fat. Tropical oils, such as coconut, palm kernel, or palm oil.  Seasonings and condiments  Onion salt, garlic salt, seasoned salt, table salt, and sea salt. Worcestershire sauce. Tartar sauce. Barbecue sauce. Teriyaki sauce. Soy sauce, including reduced-sodium. Steak sauce. Canned and packaged gravies. Fish sauce. Oyster sauce. Cocktail sauce. Store-bought horseradish. Ketchup. Mustard. Meat flavorings and tenderizers. Bouillon cubes. Hot sauces. Pre-made or packaged marinades. Pre-made or packaged taco seasonings. Relishes. Regular salad dressings.  Other foods  Salted popcorn and pretzels.  The items listed above may not be a complete list of foods and beverages you should avoid. Contact a dietitian for more information.  Where to find more information  · National Heart, Lung, and Blood [place]: www.nhlbi.nih.gov  · American Heart Association: www.heart.org  · Academy of Nutrition and Dietetics: www.eatright.org  · National Kidney Foundation: www.kidney.org  Summary  · The DASH eating plan is a healthy eating plan that has been shown to reduce high blood pressure (hypertension). It may also reduce your risk for type 2 diabetes, heart disease, and stroke.  · When on the DASH eating plan, aim to eat more fresh fruits and vegetables, whole grains, lean proteins, low-fat dairy, and heart-healthy fats.  · With the DASH eating plan, you should limit salt (sodium) intake to 2,300 mg a day. If you have hypertension, you may need to reduce your  sodium intake to 1,500 mg a day.  · Work with your health care provider or dietitian to adjust your eating plan to your individual calorie needs.  This information is not intended to replace advice given to you by your health care provider. Make sure you discuss any questions you have with your health care provider.  Document Revised: 11/20/2020 Document Reviewed: 11/20/2020  [employer] Patient Education © 2021 [employer]

## 2022-01-24 ENCOUNTER — OFFICE VISIT (OUTPATIENT)
Dept: CARDIOLOGY | Facility: CLINIC | Age: 72
End: 2022-01-24

## 2022-01-24 VITALS
BODY MASS INDEX: 42.14 KG/M2 | HEIGHT: 61 IN | WEIGHT: 223.2 LBS | SYSTOLIC BLOOD PRESSURE: 180 MMHG | HEART RATE: 68 BPM | DIASTOLIC BLOOD PRESSURE: 98 MMHG

## 2022-01-24 DIAGNOSIS — I10 ESSENTIAL HYPERTENSION: Primary | ICD-10-CM

## 2022-01-24 DIAGNOSIS — E66.01 MORBID OBESITY WITH BMI OF 40.0-44.9, ADULT: ICD-10-CM

## 2022-01-24 DIAGNOSIS — N18.30 BENIGN HYPERTENSION WITH CKD (CHRONIC KIDNEY DISEASE) STAGE III: ICD-10-CM

## 2022-01-24 DIAGNOSIS — I12.9 BENIGN HYPERTENSION WITH CKD (CHRONIC KIDNEY DISEASE) STAGE III: ICD-10-CM

## 2022-01-24 DIAGNOSIS — E78.5 HYPERLIPIDEMIA, UNSPECIFIED HYPERLIPIDEMIA TYPE: ICD-10-CM

## 2022-01-24 PROCEDURE — 93000 ELECTROCARDIOGRAM COMPLETE: CPT | Performed by: INTERNAL MEDICINE

## 2022-01-24 PROCEDURE — 99214 OFFICE O/P EST MOD 30 MIN: CPT | Performed by: INTERNAL MEDICINE

## 2022-01-24 RX ORDER — FUROSEMIDE 40 MG/1
40 TABLET ORAL DAILY
Qty: 30 TABLET | Refills: 11 | Status: SHIPPED | OUTPATIENT
Start: 2022-01-24 | End: 2022-03-04 | Stop reason: SDUPTHER

## 2022-01-24 RX ORDER — CARVEDILOL 25 MG/1
25 TABLET ORAL 2 TIMES DAILY WITH MEALS
Qty: 180 TABLET | Refills: 3 | Status: SHIPPED | OUTPATIENT
Start: 2022-01-24 | End: 2022-03-30 | Stop reason: SDUPTHER

## 2022-01-24 NOTE — PROGRESS NOTES
[facility] Follow Up Office Note     Encounter Date:22  Patient:Mendy Woods  :1950  MRN:[id number]      Chief Complaint:   Chief Complaint   Patient presents with   • Hypertension     6 week f/u       History of Presenting Illness:      Ms. Marrero is a 71 y.o. woman with past medical history notable for hypertension, mixed hyperlipidemia, morbid obesity, diabetes type 2 on insulin therapy, and chronic kidney disease stage IIIa who presents to our office for scheduled follow up.  Since her last visit unfortunately patient's blood pressure still remains elevated despite the addition of carvedilol.  She was recently seen in the emergency room noted to have elevated blood pressure then but fortunately her kidney function had actually improved slightly.  Biggest thing is she feels like she just is more bloated and retaining fluid.  She did have normal cardiac testing with no significant abnormalities.  Her other big concern is drops in her blood sugars which sometimes are undetectable and waking up with a cold sweat.        Review of Systems:  Review of Systems   Constitutional: Positive for malaise/fatigue and weight gain.   HENT: Negative.    Eyes: Negative.    Cardiovascular: Positive for dyspnea on exertion and leg swelling.   Respiratory: Positive for shortness of breath.    Endocrine: Negative.    Hematologic/Lymphatic: Negative.    Skin: Negative.    Musculoskeletal: Negative.    Gastrointestinal: Negative.    Genitourinary: Negative.    Neurological: Negative.    Psychiatric/Behavioral: Negative.    Allergic/Immunologic: Negative.        Current Outpatient Medications on File Prior to Visit   Medication Sig Dispense Refill   • aspirin 81 MG EC tablet Take  by mouth.     • atorvastatin (LIPITOR) 80 MG tablet TAKE 1 TABLET BY MOUTH EVERY DAY 90 tablet 3   • cloNIDine (CATAPRES) 0.1 MG tablet Take 1 tablet by mouth 2 (Two) Times a Day. 60 tablet 1   • Cobalamin Combinations  (B-12) 100-5000 MCG sublingual tablet Place  under the tongue.     • Continuous Blood Gluc  (FreeStyle Hien 14 Day [place]) device 1 KIT EVERY 3 (THREE) MONTHS. 1 each 1   • Continuous Blood Gluc Sensor (FreeStyle Hien 14 Day Sensor) misc USE 1 DEVICE EVERY 14 (FOURTEEN) DAYS. 2 each 3   • Diclofenac Sodium (VOLTAREN) 1 % gel gel Apply 4 g topically to the appropriate area as directed 4 (Four) Times a Day As Needed.     • ferrous sulfate 324 (65 Fe) MG tablet delayed-release EC tablet Take 324 mg by mouth 2 (Two) Times a Day With Meals.     • FREESTYLE LITE test strip USE 3 TIMES A DAY AS DIRECTED  3   • Insulin Glargine (LANTUS SOLOSTAR) 100 UNIT/ML injection pen 36 units daily 40 mL 3   • Insulin Lispro, 1 Unit Dial, (HumaLOG KwikPen) 100 UNIT/ML solution pen-injector 7 units 3 times a day with meals. 10 pen 1   • Insulin Pen Needle (BD Pen Needle Neli U/F) 32G X 4 MM misc 1 Device 2 (two) times a day. 200 each 1   • levocetirizine (XYZAL) 5 MG tablet Take 5 mg by mouth As Needed.     • losartan (COZAAR) 25 MG tablet Take 25 mg by mouth Daily. Two tabs in AM, 1 tab in PM     • Synthroid 137 MCG tablet TAKE 1 TABLET BY MOUTH EVERY DAY 90 tablet 1   • triamcinolone (KENALOG) 0.1 % ointment Apply 1 application topically to the appropriate area as directed 2 (Two) Times a Day. 15 g 0   • Vitamin D, Cholecalciferol, (CHOLECALCIFEROL) 10 MCG (400 UNIT) tablet Take 400 Units by mouth Daily.     • vitamin E 100 UNIT capsule Take 100 Units by mouth Daily.     • [DISCONTINUED] carvedilol (COREG) 12.5 MG tablet Take 1 tablet by mouth 2 (Two) Times a Day With Meals. 180 tablet 3     No current facility-administered medications on file prior to visit.       Allergies   Allergen Reactions   • Sulfa Antibiotics Other (See Comments)     Facial swelling   • Sulfamethazine Swelling       Past Medical History:   Diagnosis Date   • Abnormal blood chemistry test    • Allergic rhinitis    • Anemia, B12 deficiency    •  "Arthritis    • Cutaneous lupus erythematosus    • Dermatitis    • Disorder of kidney and ureter    • Edema of optic nerve    • Herpes zoster    • Hyperlipidemia    • Hypertension    • Laryngitis    • Low back pain    • Pernicious anemia    • Renal failure    • Sarcoidosis    • Skin rash    • Tonsillitis    • Type 1 diabetes mellitus (HCC)    • Type 2 diabetes mellitus, controlled (HCC)    • Vitamin B deficiency        Past Surgical History:   Procedure Laterality Date   • COLONOSCOPY  2007    Normal.   • LYMPH NODE BIOPSY      sarcoid       Social History     Socioeconomic History   • Marital status:    Tobacco Use   • Smoking status: Never Smoker   • Smokeless tobacco: Never Used   • Tobacco comment: occas caffeine use    Vaping Use   • Vaping Use: Never used   Substance and Sexual Activity   • Alcohol use: Yes     Comment: rarely   • Drug use: No   • Sexual activity: Yes     Partners: Male     Comment:        Family History   Problem Relation Age of Onset   • Thyroid disease Mother    • Diabetes Father    • Heart attack Father    • Hashimoto's thyroiditis Sister    • Hashimoto's thyroiditis Daughter    • Diabetes Paternal Grandmother    • Hashimoto's thyroiditis Sister    • Hashimoto's thyroiditis Daughter    • Cancer Paternal Grandfather        The following portions of the patient's history were reviewed and updated as appropriate: allergies, current medications, past family history, past medical history, past social history, past surgical history and problem list.       Objective:       Vitals:    01/24/22 1224   BP: 180/98   BP Location: Left arm   Patient Position: Sitting   Pulse: 68   Weight: 101 kg (223 lb 3.2 oz)   Height: 154.9 cm (61\")       Body mass index is 42.17 kg/m².    Physical Exam:  Constitutional: Well appearing, Well-developed, No acute distress   HENT: Oropharynx clear and membrane moist  Eyes: Normal conjunctiva, no sclera icterus.  Neck: Supple, no carotid bruit " bilaterally.  Cardiovascular: Regular rate and rhythm, No Murmur, Trace bilateral lower extremity edema.  Pulmonary: Normal respiratory effort, Normal lung sounds, no wheezing.  Abdominal: Soft, nontender, no hepatosplenomegaly, liver is non-pulsatile.  Neurological: Alert and orient x 3.   Skin: Warm, dry, no ecchymosis, no rash.  Psych: Appropriate mood and affect. Normal judgment and insight.      Lab Results   Component Value Date     01/10/2022     12/21/2021    K 4.7 01/10/2022    K 4.8 12/21/2021     01/10/2022     12/21/2021    CO2 24.2 01/10/2022    CO2 26.6 12/21/2021    BUN 26 (H) 01/10/2022    BUN 21 12/21/2021    CREATININE 1.71 (H) 01/10/2022    CREATININE 1.95 (H) 12/21/2021    EGFRIFNONA 29 (L) 01/10/2022    EGFRIFNONA 25 (L) 12/21/2021    EGFRIFAFRI 30 (L) 11/18/2021    EGFRIFAFRI 36 (L) 10/15/2021    GLUCOSE 135 (H) 01/10/2022    GLUCOSE 112 (H) 12/21/2021    CALCIUM 9.8 01/10/2022    CALCIUM 9.4 12/21/2021    PROTENTOTREF 7.2 11/18/2021    PROTENTOTREF 7.7 07/21/2021    ALBUMIN 4.40 01/10/2022    ALBUMIN 4.1 11/18/2021    BILITOT 0.4 01/10/2022    BILITOT 0.4 11/18/2021    AST 14 01/10/2022    AST 15 11/18/2021    ALT 13 01/10/2022    ALT 10 11/18/2021     Lab Results   Component Value Date    WBC 8.04 01/10/2022    WBC 11.52 (H) 06/08/2021    HGB 12.8 01/10/2022    HGB 13.3 06/08/2021    HCT 40.3 01/10/2022    HCT 40.5 06/08/2021    MCV 89.4 01/10/2022    MCV 87.3 06/08/2021     01/10/2022     06/08/2021     Lab Results   Component Value Date    CHOL 148 10/23/2020    CHOL 177 10/03/2018    TRIG 145 07/21/2021    TRIG 146 04/20/2021    HDL 47 07/21/2021    HDL 51 04/20/2021    LDL 94 07/21/2021    LDL 93 04/20/2021     Lab Results   Component Value Date    PROBNP 1,444.0 (H) 01/10/2022    PROBNP 2,004 (H) 12/06/2021     Lab Results   Component Value Date    TROPONINT 0.013 01/10/2022     Lab Results   Component Value Date    TSH 0.918 01/10/2022    TSH 0.850  07/21/2021         ECG 12 Lead    Date/Time: 1/24/2022 12:58 PM  Performed by: Yordan Fuchs MD  Authorized by: Yordan Fuchs MD   Comparison: compared with previous ECG from 1/10/2022  Similar to previous ECG  Rhythm: sinus rhythm  Other findings: low voltage        Holter monitor 1/12/2022 with tracings reviewed by myself:  · A normal monitor study.  · Underlying heart rhythm was sinus rhythm with an average heart rate of 66 bpm and a heart rate range of 51 bpm up to 96 bpm  · 1 episode of second-degree AV block Mobitz type I (Wenckebach) at 10:30 in the morning  · No symptoms reported during study.    Echocardiogram 12/20/2021 with images reviewed by myself:  · Estimated right ventricular systolic pressure from tricuspid regurgitation is normal (<35 mmHg). Calculated right ventricular systolic pressure from tricuspid regurgitation is 13 mmHg.  · Left ventricular wall thickness is consistent with mild concentric hypertrophy.  · Estimated left ventricular EF = 69% Left ventricular systolic function is normal.  · Left ventricular diastolic function was normal.  · Mild dilation of the aortic root is present.            Assessment:          Diagnosis Plan   1. Essential hypertension  Basic Metabolic Panel    ECG 12 Lead   2. Benign hypertension with CKD (chronic kidney disease) stage III ([facility])     3. Morbid obesity with BMI of 40.0-44.9, adult ([facility])     4. Hyperlipidemia, unspecified hyperlipidemia type            Plan:       Ms. Marrero is a 71 y.o. woman with past medical history notable for hypertension, mixed hyperlipidemia, morbid obesity, diabetes type 2 on insulin therapy, and chronic kidney disease stage IIIa who presents to our office for scheduled follow up.  Overall patient doing about the same.  I would hope that her blood pressure could get a little bit better.  Uptitrating her carvedilol from 12.5 mg up to 25 mg and will add Lasix 40 mg daily.  Obviously with her chronic kidney disease we  have to be careful but she does have follow-up with her nephrologist next week and I have ordered follow-up BMP.  Thiazide diuretic would be great but I think with her current creatinine clearance probably would not be as effective.  Hopefully with adding Lasix and up titration of carvedilol we can get better control of her blood pressure I think is driving a lot of her symptoms.  I also discussed her low blood sugars.  Given that her sugars seem to drop in the middle of the night recommended that she might consider decreasing her basal insulin she is currently taking 36 units of Lantus and may need to cut back I told her to try 30 units but more importantly asked her to double check with her endocrinologist and/or primary care to make sure this is the appropriate choice or if there are better options to improve her labile blood sugars.      Dyspnea on exertion:  · Echocardiogram 12/2021 demonstrates structurally normal heart  · proBNP level slightly improved but still elevated on repeat check 1/2022  · We will add low-dose Lasix 40 mg daily  · Repeat BMP and magnesium ordered to see if we cannot add a diuretic in the future    Hypertension:  · We will try and optimize blood pressure control with increasing carvedilol from 12.5 mg up to 25 mg   · We will monitor response to adding 40 mg Lasix  Daily  · Continue losartan  · We will try and wean clonidine once blood pressure compounds better control  · Repeat BMP ordered    Mixed hyperlipidemia:  · Continue high potency statin  · Lipid panel 7/2021 demonstrates no controlled LDL and total cholesterol  · CMP 1/2022 demonstrates normal ALT and AST    Follow Up:  3 Months    Thank you for allowing me to participate in the care of Mendy Woods. Feel free to contact me directly with any further questions or concerns.    Yordan Fuchs MD  [facility]  01/24/22  13:48 EST

## 2022-01-25 ENCOUNTER — LAB (OUTPATIENT)
Dept: LAB | Facility: HOSPITAL | Age: 72
End: 2022-01-25

## 2022-01-25 DIAGNOSIS — E11.29 TYPE 2 DIABETES MELLITUS WITH OTHER DIABETIC KIDNEY COMPLICATION, WITH LONG-TERM CURRENT USE OF INSULIN: ICD-10-CM

## 2022-01-25 DIAGNOSIS — Z79.4 TYPE 2 DIABETES MELLITUS WITH OTHER DIABETIC KIDNEY COMPLICATION, WITH LONG-TERM CURRENT USE OF INSULIN: ICD-10-CM

## 2022-01-25 DIAGNOSIS — I10 ESSENTIAL HYPERTENSION: ICD-10-CM

## 2022-01-25 LAB
ANION GAP SERPL CALCULATED.3IONS-SCNC: 9.6 MMOL/L (ref 5–15)
BUN SERPL-MCNC: 26 MG/DL (ref 8–23)
BUN/CREAT SERPL: 13.5 (ref 7–25)
CALCIUM SPEC-SCNC: 9.7 MG/DL (ref 8.6–10.5)
CHLORIDE SERPL-SCNC: 108 MMOL/L (ref 98–107)
CO2 SERPL-SCNC: 22.4 MMOL/L (ref 22–29)
CREAT SERPL-MCNC: 1.92 MG/DL (ref 0.57–1)
GFR SERPL CREATININE-BSD FRML MDRD: 26 ML/MIN/1.73
GLUCOSE SERPL-MCNC: 184 MG/DL (ref 65–99)
POTASSIUM SERPL-SCNC: 5.4 MMOL/L (ref 3.5–5.2)
SODIUM SERPL-SCNC: 140 MMOL/L (ref 136–145)

## 2022-01-25 PROCEDURE — 80048 BASIC METABOLIC PNL TOTAL CA: CPT

## 2022-01-25 PROCEDURE — 36415 COLL VENOUS BLD VENIPUNCTURE: CPT

## 2022-01-25 RX ORDER — FLASH GLUCOSE SENSOR
KIT MISCELLANEOUS
Refills: 3 | OUTPATIENT
Start: 2022-01-25

## 2022-01-31 DIAGNOSIS — Z79.4 TYPE 2 DIABETES MELLITUS WITH OTHER DIABETIC KIDNEY COMPLICATION, WITH LONG-TERM CURRENT USE OF INSULIN: ICD-10-CM

## 2022-01-31 DIAGNOSIS — E11.29 TYPE 2 DIABETES MELLITUS WITH OTHER DIABETIC KIDNEY COMPLICATION, WITH LONG-TERM CURRENT USE OF INSULIN: ICD-10-CM

## 2022-01-31 RX ORDER — FLASH GLUCOSE SENSOR
KIT MISCELLANEOUS
Refills: 3 | OUTPATIENT
Start: 2022-01-31

## 2022-02-01 DIAGNOSIS — Z79.4 TYPE 2 DIABETES MELLITUS WITH OTHER DIABETIC KIDNEY COMPLICATION, WITH LONG-TERM CURRENT USE OF INSULIN: ICD-10-CM

## 2022-02-01 DIAGNOSIS — E11.29 TYPE 2 DIABETES MELLITUS WITH OTHER DIABETIC KIDNEY COMPLICATION, WITH LONG-TERM CURRENT USE OF INSULIN: ICD-10-CM

## 2022-02-01 RX ORDER — FLASH GLUCOSE SENSOR
KIT MISCELLANEOUS
Qty: 2 EACH | Refills: 3 | Status: SHIPPED | OUTPATIENT
Start: 2022-02-01 | End: 2022-02-24

## 2022-02-01 NOTE — TELEPHONE ENCOUNTER
Caller: Mendy Woods    Relationship: Self    Best call back number: [phone]    Requested Prescriptions:   Requested Prescriptions     Pending Prescriptions Disp Refills   • Continuous Blood Gluc Sensor (FreeStyle Hien 14 Day Sensor) [facility] 2 each         Pharmacy where request should be sent: [facility]/PHARMACY #[id number] - [place], [place] - [address] [address]. AT [facility] [phone] [facility] [phone] FX       Does the patient have less than a 3 day supply:  [x] Yes  [] No    Chiki Olmedo Rep   02/01/22 15:36 EST

## 2022-02-02 RX ORDER — FLASH GLUCOSE SENSOR
1 KIT MISCELLANEOUS
Qty: 6 EACH | Refills: 3 | Status: SHIPPED | OUTPATIENT
Start: 2022-02-02 | End: 2022-02-24

## 2022-02-02 RX ORDER — FLASH GLUCOSE SENSOR
KIT MISCELLANEOUS
Qty: 2 EACH | Refills: 3 | OUTPATIENT
Start: 2022-02-02

## 2022-02-07 ENCOUNTER — PATIENT OUTREACH (OUTPATIENT)
Dept: CASE MANAGEMENT | Facility: OTHER | Age: 72
End: 2022-02-07

## 2022-02-07 NOTE — OUTREACH NOTE
Ambulatory Case Management Note    SDOH updated and reviewed with the patient during this program:     Financial Resource Strain: Low Risk    • Difficulty of Paying Living Expenses: Not hard at all       Food Insecurity: No Food Insecurity   • Worried About Running Out of Food in the Last Year: Never true   • Ran Out of Food in the Last Year: Never true       Transportation Needs: No Transportation Needs   • Lack of Transportation (Medical): No   • Lack of Transportation (Non-Medical): No       Patient Outreach    Spoke with patient regarding needs and concerns. Patient is following up with PCP. Patient is adjusting her medications and has noticed fewer episodes in extremely low or high blood sugar. Patient has also received the diet information for high blood pressure and is attempting to adjust diet. Patient has visited a nutritionist and plans to follow up with a diabetic educator in the near future. Follow up with PCP scheduled for 2/18/22. Follow up scheduled in 2 weeks to review care plan and patient's needs.     Care Plan: Diabetes Management   Updates made since 2/7/2022 12:00 AM      Problem: HBA1C UNCONTROLLED       Goal: Establish Regular Follow-Ups with PCP Completed 2/7/2022   Note:    Patient visits [facility] regularly     Goal: Reduce HbA1c levels below 9% Completed 2/7/2022   Note:    Less than 9 since 10/15/2021     Problem: KNOWLEDGE DEFICIT       Goal: Patient able to teach back disease management    Note:    Patient plans to follow up with Diabetic Educator when medications have been regulated.          Shayla Coates RN  Ambulatory Case Management    2/7/2022, 15:41 EST

## 2022-02-18 ENCOUNTER — OFFICE VISIT (OUTPATIENT)
Dept: FAMILY MEDICINE CLINIC | Facility: CLINIC | Age: 72
End: 2022-02-18

## 2022-02-18 VITALS
BODY MASS INDEX: 42.1 KG/M2 | TEMPERATURE: 96.6 F | SYSTOLIC BLOOD PRESSURE: 150 MMHG | DIASTOLIC BLOOD PRESSURE: 86 MMHG | HEIGHT: 61 IN | WEIGHT: 223 LBS | RESPIRATION RATE: 16 BRPM | HEART RATE: 65 BPM | OXYGEN SATURATION: 98 %

## 2022-02-18 DIAGNOSIS — E10.22 TYPE 1 DIABETES MELLITUS WITH STAGE 4 CHRONIC KIDNEY DISEASE: Primary | ICD-10-CM

## 2022-02-18 DIAGNOSIS — I10 ESSENTIAL HYPERTENSION: ICD-10-CM

## 2022-02-18 DIAGNOSIS — N18.4 TYPE 1 DIABETES MELLITUS WITH STAGE 4 CHRONIC KIDNEY DISEASE: Primary | ICD-10-CM

## 2022-02-18 PROCEDURE — 99214 OFFICE O/P EST MOD 30 MIN: CPT | Performed by: NURSE PRACTITIONER

## 2022-02-18 RX ORDER — METOPROLOL SUCCINATE 50 MG/1
TABLET, EXTENDED RELEASE ORAL
Qty: 135 TABLET | Refills: 1 | OUTPATIENT
Start: 2022-02-18

## 2022-02-18 NOTE — TELEPHONE ENCOUNTER
Rx Refill Note  Requested Prescriptions     Pending Prescriptions Disp Refills   • metoprolol succinate XL (TOPROL-XL) 50 MG 24 hr tablet [Pharmacy Med Name: METOPROLOL SUCC ER 50 MG TAB] 135 tablet 1     Sig: TAKE 1 AND 1/2 TABLETS BY MOUTH EVERY DAY      Last office visit with prescribing clinician: 1/11/2022      Next office visit with prescribing clinician: 2/18/2022       {TIP  Please add Last Relevant Lab Date if appropriate: 01/25/22    Robyn Luis MA  02/18/22, 08:52 EST

## 2022-02-19 LAB
ALBUMIN SERPL-MCNC: 4.1 G/DL (ref 3.7–4.7)
ALBUMIN/GLOB SERPL: 1.2 {RATIO} (ref 1.2–2.2)
ALP SERPL-CCNC: 91 IU/L (ref 44–121)
ALT SERPL-CCNC: 11 IU/L (ref 0–32)
AST SERPL-CCNC: 16 IU/L (ref 0–40)
BILIRUB SERPL-MCNC: 0.4 MG/DL (ref 0–1.2)
BUN SERPL-MCNC: 55 MG/DL (ref 8–27)
BUN/CREAT SERPL: 23 (ref 12–28)
CALCIUM SERPL-MCNC: 9.7 MG/DL (ref 8.7–10.3)
CHLORIDE SERPL-SCNC: 105 MMOL/L (ref 96–106)
CO2 SERPL-SCNC: 20 MMOL/L (ref 20–29)
CREAT SERPL-MCNC: 2.35 MG/DL (ref 0.57–1)
GLOBULIN SER CALC-MCNC: 3.3 G/DL (ref 1.5–4.5)
GLUCOSE SERPL-MCNC: 91 MG/DL (ref 65–99)
HBA1C MFR BLD: 8 % (ref 4.8–5.6)
POTASSIUM SERPL-SCNC: 4.8 MMOL/L (ref 3.5–5.2)
PROT SERPL-MCNC: 7.4 G/DL (ref 6–8.5)
SODIUM SERPL-SCNC: 141 MMOL/L (ref 134–144)

## 2022-02-22 ENCOUNTER — TELEPHONE (OUTPATIENT)
Dept: FAMILY MEDICINE CLINIC | Facility: CLINIC | Age: 72
End: 2022-02-22

## 2022-02-24 ENCOUNTER — TELEPHONE (OUTPATIENT)
Dept: FAMILY MEDICINE CLINIC | Facility: CLINIC | Age: 72
End: 2022-02-24

## 2022-02-24 DIAGNOSIS — Z79.4 TYPE 2 DIABETES MELLITUS WITH OTHER DIABETIC KIDNEY COMPLICATION, WITH LONG-TERM CURRENT USE OF INSULIN: ICD-10-CM

## 2022-02-24 DIAGNOSIS — N18.4 TYPE 1 DIABETES MELLITUS WITH STAGE 4 CHRONIC KIDNEY DISEASE: Primary | ICD-10-CM

## 2022-02-24 DIAGNOSIS — E10.22 TYPE 1 DIABETES MELLITUS WITH STAGE 4 CHRONIC KIDNEY DISEASE: Primary | ICD-10-CM

## 2022-02-24 DIAGNOSIS — E11.29 TYPE 2 DIABETES MELLITUS WITH OTHER DIABETIC KIDNEY COMPLICATION, WITH LONG-TERM CURRENT USE OF INSULIN: ICD-10-CM

## 2022-02-24 RX ORDER — FLASH GLUCOSE SCANNING READER
EACH MISCELLANEOUS
Qty: 1 EACH | Refills: 1 | Status: CANCELLED | OUTPATIENT
Start: 2022-02-24

## 2022-02-24 NOTE — TELEPHONE ENCOUNTER
Would not change medication. Dr. Jeffers and cardiology are managing that currently. I do not believe the losartan is causing the worsening kidney function but potentially the diuretic, the labs were forwarded to cardiology to review.

## 2022-02-24 NOTE — TELEPHONE ENCOUNTER
Caller: Mendy Woods    Relationship: Self    Best call back number:[phone]    What medication are you requesting: FREESTYLE CHARLENE 2 GLUCOSE READER       Have you had these symptoms before:    [x] Yes  [] No    Have you been treated for these symptoms before:   [x] Yes  [] No    If a prescription is needed, what is your preferred pharmacy and phone number: [facility]Y [id number] [place], [place] - [address]. AT [facility] [phone] [facility] [phone] FX     Additional notes:PATIENT STATES THAT THE SENSORS SENT IN WERE FOR THE READER ABOVE.  SHE NEEDS A PRESCRIPTION FOR THAT READER SO SHE CAN USE THE SENSORS SHE HAS RECEIVED

## 2022-02-24 NOTE — TELEPHONE ENCOUNTER
Rx Refill Note  Requested Prescriptions     Pending Prescriptions Disp Refills   • Continuous Blood Gluc  (FreeStyle Hien 14 Day [place]) device 1 each 1      Last office visit with prescribing clinician: 2/18/2022      Next office visit with prescribing clinician: 8/18/2022       {TIP  Please add Last Relevant Lab Date if appropriate: 02/18/22    Robyn Luis MA  02/24/22, 14:52 EST

## 2022-02-24 NOTE — TELEPHONE ENCOUNTER
Pt called with a concern about her medication, kidney and lab results pt needs to know if she needs to cut back losartan (COZAAR) 25 MG tablet. Also patient said   Dr Jeffers said there is not reason for sooner appointment. Pt is confused and would like a call back as soon possible.

## 2022-02-28 RX ORDER — CLONIDINE HYDROCHLORIDE 0.1 MG/1
TABLET ORAL
Qty: 60 TABLET | Refills: 1 | Status: SHIPPED | OUTPATIENT
Start: 2022-02-28 | End: 2022-03-30 | Stop reason: SDUPTHER

## 2022-03-03 ENCOUNTER — TELEPHONE (OUTPATIENT)
Dept: CARDIOLOGY | Facility: CLINIC | Age: 72
End: 2022-03-03

## 2022-03-03 DIAGNOSIS — I12.9 BENIGN HYPERTENSION WITH CKD (CHRONIC KIDNEY DISEASE) STAGE III: Primary | ICD-10-CM

## 2022-03-03 DIAGNOSIS — N18.30 BENIGN HYPERTENSION WITH CKD (CHRONIC KIDNEY DISEASE) STAGE III: Primary | ICD-10-CM

## 2022-03-03 NOTE — TELEPHONE ENCOUNTER
Pt called and left VM stating he recently had some lab work done 02/18/22 by his PCP. He would like your opinion on PCP recommendations.      He can be reached at [phone]    Thanks

## 2022-03-04 RX ORDER — FUROSEMIDE 40 MG/1
20 TABLET ORAL DAILY
Qty: 30 TABLET | Refills: 11
Start: 2022-03-04 | End: 2023-01-16

## 2022-03-04 RX ORDER — LOSARTAN POTASSIUM 25 MG/1
25 TABLET ORAL 2 TIMES DAILY
Qty: 180 TABLET | Refills: 3
Start: 2022-03-04 | End: 2022-03-15 | Stop reason: SDUPTHER

## 2022-03-04 NOTE — TELEPHONE ENCOUNTER
Called patient and will decrease losartan from 75 mg daily down to 50 mg twice a day.  Will also decrease lasix from 40 mg to 20 mg daily.  Will get labs in one week

## 2022-03-14 ENCOUNTER — LAB (OUTPATIENT)
Dept: LAB | Facility: HOSPITAL | Age: 72
End: 2022-03-14

## 2022-03-14 DIAGNOSIS — I12.9 BENIGN HYPERTENSION WITH CKD (CHRONIC KIDNEY DISEASE) STAGE III: ICD-10-CM

## 2022-03-14 DIAGNOSIS — N18.30 BENIGN HYPERTENSION WITH CKD (CHRONIC KIDNEY DISEASE) STAGE III: ICD-10-CM

## 2022-03-14 LAB
ANION GAP SERPL CALCULATED.3IONS-SCNC: 8.9 MMOL/L (ref 5–15)
BUN SERPL-MCNC: 39 MG/DL (ref 8–23)
BUN/CREAT SERPL: 16.6 (ref 7–25)
CALCIUM SPEC-SCNC: 9.5 MG/DL (ref 8.6–10.5)
CHLORIDE SERPL-SCNC: 106 MMOL/L (ref 98–107)
CO2 SERPL-SCNC: 24.1 MMOL/L (ref 22–29)
CREAT SERPL-MCNC: 2.35 MG/DL (ref 0.57–1)
EGFRCR SERPLBLD CKD-EPI 2021: 21.5 ML/MIN/1.73
GLUCOSE SERPL-MCNC: 154 MG/DL (ref 65–99)
POTASSIUM SERPL-SCNC: 5.2 MMOL/L (ref 3.5–5.2)
SODIUM SERPL-SCNC: 139 MMOL/L (ref 136–145)

## 2022-03-14 PROCEDURE — 36415 COLL VENOUS BLD VENIPUNCTURE: CPT

## 2022-03-14 PROCEDURE — 80048 BASIC METABOLIC PNL TOTAL CA: CPT

## 2022-03-15 ENCOUNTER — TELEPHONE (OUTPATIENT)
Dept: CARDIOLOGY | Facility: CLINIC | Age: 72
End: 2022-03-15

## 2022-03-15 DIAGNOSIS — N18.30 BENIGN HYPERTENSION WITH CKD (CHRONIC KIDNEY DISEASE) STAGE III: Primary | ICD-10-CM

## 2022-03-15 DIAGNOSIS — I12.9 BENIGN HYPERTENSION WITH CKD (CHRONIC KIDNEY DISEASE) STAGE III: Primary | ICD-10-CM

## 2022-03-15 RX ORDER — LOSARTAN POTASSIUM 25 MG/1
25 TABLET ORAL DAILY
Qty: 180 TABLET | Refills: 3
Start: 2022-03-15 | End: 2023-03-16

## 2022-03-15 NOTE — TELEPHONE ENCOUNTER
Call patient with blood work results.  Creatinine has stabilized and BUN slightly better with prior adjustments.  Blood pressure is also doing very well.  I think we can try and de-escalate her losartan just a little bit more we will do 50 mg daily and hopefully can maintain good blood pressure control inmate see if this will improve her kidney function a little bit.  If not I think we probably leave things where they are at since creatinine has at least stabilized.    We will repeat blood work in 1 week

## 2022-03-22 ENCOUNTER — LAB (OUTPATIENT)
Dept: LAB | Facility: HOSPITAL | Age: 72
End: 2022-03-22

## 2022-03-22 DIAGNOSIS — I12.9 BENIGN HYPERTENSION WITH CKD (CHRONIC KIDNEY DISEASE) STAGE III: ICD-10-CM

## 2022-03-22 DIAGNOSIS — N18.30 BENIGN HYPERTENSION WITH CKD (CHRONIC KIDNEY DISEASE) STAGE III: ICD-10-CM

## 2022-03-22 LAB
ANION GAP SERPL CALCULATED.3IONS-SCNC: 9.5 MMOL/L (ref 5–15)
BUN SERPL-MCNC: 38 MG/DL (ref 8–23)
BUN/CREAT SERPL: 17.8 (ref 7–25)
CALCIUM SPEC-SCNC: 9.8 MG/DL (ref 8.6–10.5)
CHLORIDE SERPL-SCNC: 106 MMOL/L (ref 98–107)
CO2 SERPL-SCNC: 22.5 MMOL/L (ref 22–29)
CREAT SERPL-MCNC: 2.14 MG/DL (ref 0.57–1)
EGFRCR SERPLBLD CKD-EPI 2021: 24.1 ML/MIN/1.73
GLUCOSE SERPL-MCNC: 105 MG/DL (ref 65–99)
POTASSIUM SERPL-SCNC: 5.1 MMOL/L (ref 3.5–5.2)
SODIUM SERPL-SCNC: 138 MMOL/L (ref 136–145)

## 2022-03-22 PROCEDURE — 36415 COLL VENOUS BLD VENIPUNCTURE: CPT

## 2022-03-22 PROCEDURE — 80048 BASIC METABOLIC PNL TOTAL CA: CPT

## 2022-03-23 ENCOUNTER — TELEPHONE (OUTPATIENT)
Dept: CARDIOLOGY | Facility: CLINIC | Age: 72
End: 2022-03-23

## 2022-03-23 NOTE — TELEPHONE ENCOUNTER
Called and left message with patient.  Her kidney function and electrolytes are doing better.  Last time we talked a week ago her blood pressure was doing fantastic on her current regimen and I would like to continue with her current regimen if blood pressure still doing well.  She is going to see us next week can follow-up on her blood pressure at that time.

## 2022-03-24 ENCOUNTER — PATIENT OUTREACH (OUTPATIENT)
Dept: CASE MANAGEMENT | Facility: OTHER | Age: 72
End: 2022-03-24

## 2022-03-24 NOTE — OUTREACH NOTE
AMBULATORY CASE MANAGEMENT NOTE    Name and Relationship of Patient/Support Person: Mendy Woods - Self    Adult Patient Profile  Questions/Answers    Flowsheet Row Most Recent Value   Symptoms/Conditions Managed at Home diabetes, type 2, cardiovascular   Barriers to Managing Health medication side effects, none  [blood pressure medicine affecting renal function. Patient following up with nephrology and cardiology. ]   Cardiovascular Symptoms/Conditions hypertension   Cardiovascular Management Strategies medication therapy   Cardiovascular Self-Management Outcome 4 (good)   Diabetes Management Strategies activity, medication therapy, blood glucose testing   Diabetes Self-Management Outcome 3 (uncertain)   Diabetes Comment Patient following up with diatician to assist with diabetic management   Identifying Life Goals Patient plans to walk more with the warm weather. Patient also states she has an appointment scheduled with the diatitian to assist with blood sugar management.    Identifying Health Goals managing weight, be more active   Importance of Change 10 - extremely important   Confidence to Make Change 10 - extremely confident   Readiness to Change 7      Patient Outreach    Introduced self, explained ACM RN role and provided contact information. Spoke with patient regarding health and wellness. No needs at this time. Patient following up with cardiology for HTN, nephrology to monitor kidney function with new medications, and dietitian to assist with managing blood sugar and food consumption. Follow up scheduled in 1 month.     Education Documentation  No documentation found.        PANCHO VELASQUEZ  Ambulatory Case Management    3/24/2022, 13:53 EDT

## 2022-03-30 ENCOUNTER — OFFICE VISIT (OUTPATIENT)
Dept: CARDIOLOGY | Facility: CLINIC | Age: 72
End: 2022-03-30

## 2022-03-30 VITALS
DIASTOLIC BLOOD PRESSURE: 82 MMHG | SYSTOLIC BLOOD PRESSURE: 138 MMHG | HEIGHT: 61 IN | OXYGEN SATURATION: 98 % | RESPIRATION RATE: 16 BRPM | BODY MASS INDEX: 41.54 KG/M2 | WEIGHT: 220 LBS | HEART RATE: 74 BPM

## 2022-03-30 DIAGNOSIS — E10.21 TYPE 1 DIABETES MELLITUS WITH NEPHROPATHY: ICD-10-CM

## 2022-03-30 DIAGNOSIS — N18.30 BENIGN HYPERTENSION WITH CKD (CHRONIC KIDNEY DISEASE) STAGE III: Primary | ICD-10-CM

## 2022-03-30 DIAGNOSIS — I10 ESSENTIAL HYPERTENSION: ICD-10-CM

## 2022-03-30 DIAGNOSIS — E13.9 LATENT AUTOIMMUNE DIABETES MELLITUS IN ADULTS (LADA): ICD-10-CM

## 2022-03-30 DIAGNOSIS — E78.5 HYPERLIPIDEMIA, UNSPECIFIED HYPERLIPIDEMIA TYPE: ICD-10-CM

## 2022-03-30 DIAGNOSIS — I12.9 BENIGN HYPERTENSION WITH CKD (CHRONIC KIDNEY DISEASE) STAGE III: Primary | ICD-10-CM

## 2022-03-30 PROCEDURE — 99214 OFFICE O/P EST MOD 30 MIN: CPT | Performed by: NURSE PRACTITIONER

## 2022-03-30 PROCEDURE — 93000 ELECTROCARDIOGRAM COMPLETE: CPT | Performed by: NURSE PRACTITIONER

## 2022-03-30 RX ORDER — CLONIDINE HYDROCHLORIDE 0.1 MG/1
0.1 TABLET ORAL 2 TIMES DAILY
Qty: 180 TABLET | Refills: 3 | Status: SHIPPED | OUTPATIENT
Start: 2022-03-30 | End: 2022-12-27

## 2022-03-30 RX ORDER — CARVEDILOL 25 MG/1
25 TABLET ORAL 2 TIMES DAILY WITH MEALS
Qty: 180 TABLET | Refills: 3 | Status: SHIPPED | OUTPATIENT
Start: 2022-03-30 | End: 2023-03-28

## 2022-03-31 RX ORDER — INSULIN LISPRO 100 [IU]/ML
INJECTION, SOLUTION INTRAVENOUS; SUBCUTANEOUS
Qty: 10 PEN | Refills: 1 | Status: SHIPPED | OUTPATIENT
Start: 2022-03-31 | End: 2022-05-11 | Stop reason: SDUPTHER

## 2022-03-31 RX ORDER — LEVOTHYROXINE SODIUM 137 UG/1
137 TABLET ORAL DAILY
Qty: 90 TABLET | Refills: 1 | Status: SHIPPED | OUTPATIENT
Start: 2022-03-31 | End: 2022-06-29 | Stop reason: SDUPTHER

## 2022-03-31 NOTE — TELEPHONE ENCOUNTER
Rx Refill Note  Requested Prescriptions     Pending Prescriptions Disp Refills   • levothyroxine (Synthroid) 137 MCG tablet 90 tablet 1     Sig: Take 1 tablet by mouth Daily.   • Insulin Lispro, 1 Unit Dial, (HumaLOG KwikPen) 100 UNIT/ML solution pen-injector 10 pen 1     Si units 3 times a day with meals.      Last office visit with prescribing clinician: 2022      Next office visit with prescribing clinician: 2022       {TIP  Please add Last Relevant Lab Date if appropriate: 22    Robyn Luis MA  22, 16:20 EDT

## 2022-03-31 NOTE — TELEPHONE ENCOUNTER
Caller: Mendy Woods    Relationship: Self    Requested Prescriptions:   Requested Prescriptions     Pending Prescriptions Disp Refills   • levothyroxine (Synthroid) 137 MCG tablet 90 tablet 1     Sig: Take 1 tablet by mouth Daily.   • Insulin Lispro, 1 Unit Dial, (HumaLOG KwikPen) 100 UNIT/ML solution pen-injector 10 pen 1     Si units 3 times a day with meals.        Pharmacy where request should be sent: [facility]/PHARMACY #[id number] [place], [place] - [address]. AT [facility] [phone] [facility] [phone]

## 2022-04-01 RX ORDER — LEVOTHYROXINE SODIUM 137 UG/1
137 TABLET ORAL DAILY
Qty: 90 TABLET | Refills: 1 | OUTPATIENT
Start: 2022-04-01

## 2022-04-01 NOTE — TELEPHONE ENCOUNTER
Rx Refill Note  Requested Prescriptions     Pending Prescriptions Disp Refills   • levothyroxine (Synthroid) 137 MCG tablet 90 tablet 1     Sig: Take 1 tablet by mouth Daily.      Last office visit with prescribing clinician: 2/18/2022      Next office visit with prescribing clinician: 8/18/2022       {TIP  Please add Last Relevant Lab Date if appropriate: 03/22/22    Robyn Luis MA  04/01/22, 16:29 EDT

## 2022-04-27 ENCOUNTER — TELEPHONE (OUTPATIENT)
Dept: FAMILY MEDICINE CLINIC | Facility: CLINIC | Age: 72
End: 2022-04-27

## 2022-04-27 ENCOUNTER — PATIENT OUTREACH (OUTPATIENT)
Dept: CASE MANAGEMENT | Facility: OTHER | Age: 72
End: 2022-04-27

## 2022-04-27 DIAGNOSIS — N18.4 TYPE 1 DIABETES MELLITUS WITH STAGE 4 CHRONIC KIDNEY DISEASE: Primary | ICD-10-CM

## 2022-04-27 DIAGNOSIS — E10.22 TYPE 1 DIABETES MELLITUS WITH STAGE 4 CHRONIC KIDNEY DISEASE: Primary | ICD-10-CM

## 2022-04-27 NOTE — OUTREACH NOTE
AMBULATORY CASE MANAGEMENT NOTE    Name and Relationship of Patient/Support Person: Mendy Woods - Self    Patient Outreach    Introduced self, explained ACM RN role and provided contact information. Spoke with patient regarding health and wellness. Patient doesn't feel her blood sugar is controled and requested a second dietician consult. Patient is still working to manage diet and control blood sugars. Patient also has follow up with nephrology on 5/5/22. Blood pressure is normal per patient. ACM agreed to assist with consult referral if needed.  going out of town at end of May and patient would like to complete before then. Follow up scheduled tomorrow.     Education Documentation  No documentation found.        PANCHO VELASQUEZ  Ambulatory Case Management    4/27/2022, 15:39 EDT

## 2022-05-03 ENCOUNTER — LAB (OUTPATIENT)
Dept: LAB | Facility: HOSPITAL | Age: 72
End: 2022-05-03

## 2022-05-03 ENCOUNTER — TRANSCRIBE ORDERS (OUTPATIENT)
Dept: ADMINISTRATIVE | Facility: HOSPITAL | Age: 72
End: 2022-05-03

## 2022-05-03 DIAGNOSIS — I12.9 HYPERTENSIVE NEPHROPATHY: Primary | ICD-10-CM

## 2022-05-03 DIAGNOSIS — N18.32 CHRONIC KIDNEY DISEASE (CKD) STAGE G3B/A1, MODERATELY DECREASED GLOMERULAR FILTRATION RATE (GFR) BETWEEN 30-44 ML/MIN/1.73 SQUARE METER AND ALBUMINURIA CREATININE RATIO LESS THAN 30 MG/G (CMS/H*: ICD-10-CM

## 2022-05-03 DIAGNOSIS — E55.9 AVITAMINOSIS D: ICD-10-CM

## 2022-05-03 DIAGNOSIS — R80.9 PROTEINURIA, UNSPECIFIED TYPE: ICD-10-CM

## 2022-05-03 DIAGNOSIS — I12.9 HYPERTENSIVE NEPHROPATHY: ICD-10-CM

## 2022-05-03 LAB
25(OH)D3 SERPL-MCNC: 36.5 NG/ML (ref 30–100)
ALBUMIN SERPL-MCNC: 4.3 G/DL (ref 3.5–5.2)
ANION GAP SERPL CALCULATED.3IONS-SCNC: 9.5 MMOL/L (ref 5–15)
BACTERIA UR QL AUTO: ABNORMAL /HPF
BILIRUB UR QL STRIP: NEGATIVE
BUN SERPL-MCNC: 59 MG/DL (ref 8–23)
BUN/CREAT SERPL: 22.7 (ref 7–25)
CALCIUM SPEC-SCNC: 9.5 MG/DL (ref 8.6–10.5)
CHLORIDE SERPL-SCNC: 106 MMOL/L (ref 98–107)
CLARITY UR: CLEAR
CO2 SERPL-SCNC: 24.5 MMOL/L (ref 22–29)
COLOR UR: YELLOW
CREAT SERPL-MCNC: 2.6 MG/DL (ref 0.57–1)
CREAT UR-MCNC: 53.7 MG/DL
CREAT UR-MCNC: 53.9 MG/DL
EGFRCR SERPLBLD CKD-EPI 2021: 19.1 ML/MIN/1.73
GLUCOSE SERPL-MCNC: 145 MG/DL (ref 65–99)
GLUCOSE UR STRIP-MCNC: NEGATIVE MG/DL
HGB UR QL STRIP.AUTO: NEGATIVE
HYALINE CASTS UR QL AUTO: ABNORMAL /LPF
KETONES UR QL STRIP: NEGATIVE
LEUKOCYTE ESTERASE UR QL STRIP.AUTO: ABNORMAL
NITRITE UR QL STRIP: NEGATIVE
PH UR STRIP.AUTO: <=5 [PH] (ref 5–8)
PHOSPHATE SERPL-MCNC: 4.6 MG/DL (ref 2.5–4.5)
POTASSIUM SERPL-SCNC: 4.6 MMOL/L (ref 3.5–5.2)
PROT ?TM UR-MCNC: 21.1 MG/DL
PROT SERPL-MCNC: 7.7 G/DL (ref 6–8.5)
PROT UR QL STRIP: ABNORMAL
PROT/CREAT UR: 392.9 MG/G CREA (ref 0–200)
RBC # UR STRIP: ABNORMAL /HPF
REF LAB TEST METHOD: ABNORMAL
SODIUM SERPL-SCNC: 140 MMOL/L (ref 136–145)
SP GR UR STRIP: 1.01 (ref 1–1.03)
SQUAMOUS #/AREA URNS HPF: ABNORMAL /HPF
UROBILINOGEN UR QL STRIP: ABNORMAL
WBC # UR STRIP: ABNORMAL /HPF

## 2022-05-03 PROCEDURE — 82306 VITAMIN D 25 HYDROXY: CPT

## 2022-05-03 PROCEDURE — 80069 RENAL FUNCTION PANEL: CPT

## 2022-05-03 PROCEDURE — 84155 ASSAY OF PROTEIN SERUM: CPT

## 2022-05-03 PROCEDURE — 84156 ASSAY OF PROTEIN URINE: CPT

## 2022-05-03 PROCEDURE — 36415 COLL VENOUS BLD VENIPUNCTURE: CPT

## 2022-05-03 PROCEDURE — 81001 URINALYSIS AUTO W/SCOPE: CPT

## 2022-05-03 PROCEDURE — 82570 ASSAY OF URINE CREATININE: CPT

## 2022-05-05 ENCOUNTER — PATIENT OUTREACH (OUTPATIENT)
Dept: CASE MANAGEMENT | Facility: OTHER | Age: 72
End: 2022-05-05

## 2022-05-05 NOTE — OUTREACH NOTE
AMBULATORY CASE MANAGEMENT NOTE    Name and Relationship of Patient/Support Person: Peggy Mendy M - Self    Send Education  Questions/Answers    Flowsheet Row Most Recent Value   Patient Outreach Summary (AVS) Send Outreach Summary          Education Documentation  Benefits, taught by Shayla Coates RN at 5/5/2022  3:21 PM.  Learner: Patient  Readiness: Acceptance  Method: Teach Back  Response: Verbalizes Understanding    Consistent Eating Pattern, taught by Shayla Coates RN at 5/5/2022  3:21 PM.  Learner: Patient  Readiness: Acceptance  Method: Teach Back  Response: Verbalizes Understanding    Healthy Food Choices, taught by Shayla Coates RN at 5/5/2022  3:21 PM.  Learner: Patient  Readiness: Acceptance  Method: Teach Back  Response: Verbalizes Understanding    Insulin Therapy, taught by Shayla Coates RN at 5/5/2022  3:21 PM.  Learner: Patient  Readiness: Acceptance  Method: Teach Back  Response: Verbalizes Understanding    Blood Glucose Goal, taught by Shayla Coates RN at 5/5/2022  3:21 PM.  Learner: Patient  Readiness: Acceptance  Method: Teach Back  Response: Verbalizes Understanding      Patient Outreach    Introduced self, explained ACM RN role and provided contact information. Spoke with patient regarding health and wellness. Patient is still working on balancing her blood sugar levels. Patient attempting to eat at the same times and to eat the same foods to better manage her blood sugar. Reviewed need to increase exercise to increase energy levels. ACM recommended step tracker to monitor movement and monitor goal progress. Patient states her renal function is at 30%. Patient is planning to adjust diet to support kidney function and to remove salt from her diet. Patient requests easy information on exercise, diet, and resources. Follow up scheduled in 2 weeks to review patient's progress with exercise plan. Patient also waiting for diabetic education referral. ACM added education to be sent to  patient.       PANCHO VELASQUEZ  Ambulatory Case Management    5/5/2022, 15:23 EDT

## 2022-05-11 RX ORDER — INSULIN LISPRO 100 [IU]/ML
INJECTION, SOLUTION INTRAVENOUS; SUBCUTANEOUS
Qty: 10 PEN | Refills: 1 | Status: SHIPPED | OUTPATIENT
Start: 2022-05-11 | End: 2022-06-13 | Stop reason: SDUPTHER

## 2022-05-11 RX ORDER — INSULIN GLARGINE 100 [IU]/ML
INJECTION, SOLUTION SUBCUTANEOUS
Qty: 5 PEN | Refills: 3 | Status: SHIPPED | OUTPATIENT
Start: 2022-05-11 | End: 2022-06-29 | Stop reason: SDUPTHER

## 2022-05-11 NOTE — TELEPHONE ENCOUNTER
Rx Refill Note  Requested Prescriptions     Pending Prescriptions Disp Refills   • Insulin Lispro, 1 Unit Dial, (HumaLOG KwikPen) 100 UNIT/ML solution pen-injector 10 pen 1     Si units 3 times a day with meals.      Last office visit with prescribing clinician: 2022      Next office visit with prescribing clinician: 2022       {TIP  Please add Last Relevant Lab Date if appropriate: 22    Robyn Luis MA  22, 13:28 EDT

## 2022-05-11 NOTE — TELEPHONE ENCOUNTER
Caller: Mendy Woods    Relationship: Self    Best call back number: [phone]    Requested Prescriptions:   Requested Prescriptions     Pending Prescriptions Disp Refills   • Insulin Lispro, 1 Unit Dial, (HumaLOG KwikPen) 100 UNIT/ML solution pen-injector 10 pen 1     Si units 3 times a day with meals.        Pharmacy where request should be sent: [facility]/PHARMACY #[id number] [place], [place] - [address] [address] AT [facility] [phone] [facility] [phone] FX     Does the patient have less than a 3 day supply:  [x] Yes  [] No    Keturah Armstrong, RegEvelyn Rep   22 12:12 EDT

## 2022-05-16 RX ORDER — PEN NEEDLE, DIABETIC 32GX 5/32"
NEEDLE, DISPOSABLE MISCELLANEOUS
Qty: 200 EACH | Refills: 1 | OUTPATIENT
Start: 2022-05-16

## 2022-05-23 ENCOUNTER — PATIENT OUTREACH (OUTPATIENT)
Dept: CASE MANAGEMENT | Facility: OTHER | Age: 72
End: 2022-05-23

## 2022-05-23 ENCOUNTER — OFFICE VISIT (OUTPATIENT)
Dept: FAMILY MEDICINE CLINIC | Facility: CLINIC | Age: 72
End: 2022-05-23

## 2022-05-23 VITALS
HEIGHT: 61 IN | TEMPERATURE: 97 F | SYSTOLIC BLOOD PRESSURE: 120 MMHG | OXYGEN SATURATION: 98 % | HEART RATE: 61 BPM | RESPIRATION RATE: 12 BRPM | BODY MASS INDEX: 42.04 KG/M2 | DIASTOLIC BLOOD PRESSURE: 60 MMHG | WEIGHT: 222.66 LBS

## 2022-05-23 DIAGNOSIS — R05.9 COUGH: ICD-10-CM

## 2022-05-23 DIAGNOSIS — J02.0 STREP THROAT: Primary | ICD-10-CM

## 2022-05-23 DIAGNOSIS — J02.9 SORETHROAT: ICD-10-CM

## 2022-05-23 DIAGNOSIS — G93.31 POST VIRAL SYNDROME: ICD-10-CM

## 2022-05-23 LAB
EXPIRATION DATE: ABNORMAL
INTERNAL CONTROL: ABNORMAL
Lab: ABNORMAL
S PYO AG THROAT QL: POSITIVE

## 2022-05-23 PROCEDURE — 87880 STREP A ASSAY W/OPTIC: CPT | Performed by: NURSE PRACTITIONER

## 2022-05-23 PROCEDURE — 99214 OFFICE O/P EST MOD 30 MIN: CPT | Performed by: NURSE PRACTITIONER

## 2022-05-23 RX ORDER — AMOXICILLIN 500 MG/1
500 CAPSULE ORAL DAILY
Qty: 10 CAPSULE | Refills: 0 | Status: SHIPPED | OUTPATIENT
Start: 2022-05-23 | End: 2022-07-06 | Stop reason: ALTCHOICE

## 2022-05-23 NOTE — PROGRESS NOTES
"Chief Complaint  Sore Throat and Cough    Subjective          Mendy Woods presents to [facility]  Pleasant patient here today complains of sinus drainage cough sore throat, went to urgent care several weeks ago as she has had thrush before she thought it might be thrush similar symptoms previously several years ago and she is a diabetic.  She was given Diflucan 2 days of dosing as well as some Doxy for 7 days, she felt better but then started having some the same symptoms she has had a least 2 COVID test both negative and she is vaccinated for COVID as well.  Her greatest symptom is a sore throat but still has cough and more productive lately with some yellow phlegm she is having no high fever no chest pains or shortness of breath no lethargy,    She has had pneumonia in the past  She is up-to-date with her strep pneumo vaccinations her blood pressures controlled she is without fever.        Sore Throat   Associated symptoms include coughing.   Cough  Associated symptoms include a sore throat.       Objective   Vital Signs:  /60   Pulse 61   Temp 97 °F (36.1 °C) (Infrared)   Resp 12   Ht 155.6 cm (61.25\")   Wt 101 kg (222 lb 10.6 oz)   SpO2 98%   BMI 41.73 kg/m²         Physical Exam  Vitals reviewed.   Constitutional:       General: She is not in acute distress.     Appearance: Normal appearance. She is well-developed. She is not ill-appearing, toxic-appearing or diaphoretic.      Comments: Patient is quite pleasant appears well good historian   HENT:      Head: Normocephalic.      Comments: Pharynx is clear neck is supple no cervical adenopathy.  Speech is clear.  No trismus.  No stridor.     Nose: Nose normal.   Eyes:      General: No scleral icterus.     Conjunctiva/sclera: Conjunctivae normal.      Pupils: Pupils are equal, round, and reactive to light.   Neck:      Thyroid: No thyromegaly.      Vascular: No JVD.   Cardiovascular:      Rate and Rhythm: Normal " rate and regular rhythm.      Heart sounds: Normal heart sounds. No murmur heard.    No friction rub. No gallop.   Pulmonary:      Effort: Pulmonary effort is normal. No respiratory distress.      Breath sounds: Normal breath sounds. No stridor. No wheezing or rales.      Comments: Chest clear throughout, respirations less than 20 able to complete full sentences without dyspnea.  Abdominal:      Comments: No hepatosplenomegaly, no ascites,   Musculoskeletal:         General: No tenderness.      Cervical back: Neck supple.   Lymphadenopathy:      Cervical: No cervical adenopathy.   Skin:     General: Skin is warm and dry.      Findings: No erythema or rash.   Neurological:      Mental Status: She is alert and oriented to person, place, and time.      Deep Tendon Reflexes: Reflexes are normal and symmetric.   Psychiatric:         Behavior: Behavior normal.         Thought Content: Thought content normal.         Judgment: Judgment normal.        Result Review :{Labs  Result Review  Imaging  Med Tab  Media  Procedures :23}                 Assessment and Plan    Diagnoses and all orders for this visit:    1. Sorethroat (Primary)  -     POC Rapid Strep A             Follow Up   No follow-ups on file.  Patient was given instructions and counseling regarding her condition or for health maintenance advice. Please see specific information pulled into the AVS if appropriate.     Strep is positive it could be a false positive since she has had a sore throat for several weeks, likely reliable with the other symptoms of cough runny nose congestion  Either allergies or we had a upper restaurant infection and now we have strep?  With some post viral symptoms?  Nonetheless we will treat strep throat with Amoxil  Her  is already taken Amoxil for something else but they will cover cover strep as well    Discharge instructions strep test to rule out strep which is less likely  Chest x-ray to ensure is clear no signs of  pneumonia I think this would be low possibility probability    Plenty of fluids,  Saline nasal spray if needed, anything over-the-counter Mucinex DM if needed if high fever chest pain increased shortness of breath lethargy weakness emergency room  Follow-up with Felicia next week if your symptoms have not greatly improved,  Your cough may last a couple more weeks but should resolve    But should you have new onset fever worsening cough increasing fatigue new symptoms of  Illness urgent recheck or ER.    Nystatin, as a precaution should this be thrush.  As you recently finished doxycycline which may increase your risk of thrush.  As well as diabetes.    Start amoxil now for strep throat

## 2022-05-23 NOTE — OUTREACH NOTE
AMBULATORY CASE MANAGEMENT NOTE    Name and Relationship of Patient/Support Person: Mendy Woods - Self    Patient Outreach    Introduced self, explained ACM RN role and provided contact information. Spoke with the patient regarding health and wellness. Patient diagnoses with strep and is starting an antibiotic. Keeping up food journal and will return to dietician in June. Patient has not located her step counter yet, but has been increasing movement with stretches. Follow up scheduled in 1 month for continued needs.     Education Documentation  No documentation found.        PANCHO VELASQUEZ  Ambulatory Case Management    5/23/2022, 17:14 EDT

## 2022-05-23 NOTE — PATIENT INSTRUCTIONS
Discharge instructions strep test to rule out strep which is less likely  Chest x-ray to ensure is clear no signs of pneumonia I think this would be low possibility probability    Plenty of fluids,  Saline nasal spray if needed, anything over-the-counter Mucinex DM if needed if high fever chest pain increased shortness of breath lethargy weakness emergency room  Follow-up with Felicia next week if your symptoms have not greatly improved,  Your cough may last a couple more weeks but should resolve    But should you have new onset fever worsening cough increasing fatigue new symptoms of  Illness urgent recheck or ER.    Nystatin, as a precaution should this be thrush.  As you recently finished doxycycline which may increase your risk of thrush.  As well as diabetes.    Start amoxil now for strep throat

## 2022-06-09 ENCOUNTER — TELEPHONE (OUTPATIENT)
Dept: FAMILY MEDICINE CLINIC | Facility: CLINIC | Age: 72
End: 2022-06-09

## 2022-06-09 ENCOUNTER — OFFICE VISIT (OUTPATIENT)
Dept: FAMILY MEDICINE CLINIC | Facility: CLINIC | Age: 72
End: 2022-06-09

## 2022-06-09 VITALS
BODY MASS INDEX: 41.44 KG/M2 | WEIGHT: 219.5 LBS | RESPIRATION RATE: 12 BRPM | SYSTOLIC BLOOD PRESSURE: 120 MMHG | DIASTOLIC BLOOD PRESSURE: 60 MMHG | TEMPERATURE: 97 F | HEIGHT: 61 IN | HEART RATE: 73 BPM | OXYGEN SATURATION: 98 %

## 2022-06-09 DIAGNOSIS — J02.0 STREP THROAT: Primary | ICD-10-CM

## 2022-06-09 DIAGNOSIS — J02.9 SORETHROAT: ICD-10-CM

## 2022-06-09 DIAGNOSIS — R05.8 POST-VIRAL COUGH SYNDROME: ICD-10-CM

## 2022-06-09 LAB
EXPIRATION DATE: ABNORMAL
INTERNAL CONTROL: ABNORMAL
Lab: ABNORMAL
S PYO AG THROAT QL: POSITIVE

## 2022-06-09 PROCEDURE — 87880 STREP A ASSAY W/OPTIC: CPT | Performed by: NURSE PRACTITIONER

## 2022-06-09 PROCEDURE — 99213 OFFICE O/P EST LOW 20 MIN: CPT | Performed by: NURSE PRACTITIONER

## 2022-06-09 RX ORDER — AZITHROMYCIN 250 MG/1
TABLET, FILM COATED ORAL
Qty: 6 TABLET | Refills: 0 | Status: SHIPPED | OUTPATIENT
Start: 2022-06-09 | End: 2022-07-06 | Stop reason: ALTCHOICE

## 2022-06-09 RX ORDER — FLUCONAZOLE 150 MG/1
150 TABLET ORAL ONCE
Qty: 1 TABLET | Refills: 1 | Status: SHIPPED | OUTPATIENT
Start: 2022-06-09 | End: 2022-06-09

## 2022-06-09 NOTE — PATIENT INSTRUCTIONS
You have post viral syndrome causing your cough congestion, your strep test is positive fever you have active strep infection pharynx, or you have carrier status which means you have strep presents but no active infection.    Zithromax now  Diflucan if candidiasis infection yeast infection  Sent me a message if not resolving sore throat 1 week      Discharge instructions,  Continue plenty fluids, if not already Flonase, antihistamine of choice    If thick phlegm try some Mucinex,    Humidified air if thick phlegm    If high fever chest pain increased shortness of breath lethargy weakness emergency room  If cough does not resolve in a couple weeks you need a chest x-ray    Otherwise I suspect most of the symptoms are post viral still having postnasal drip  Related to excessive mucus production and trachea inflammation recovering from your recent bronchitis.  This is called postviral or post cough syndrome.

## 2022-06-09 NOTE — PROGRESS NOTES
"Chief Complaint  Sore Throat and Cough    Subjective        Mendy Woods presents to [facility]  Very pleasant patient who unfortunately she has been feeling sick for the last month, she has had upper respiratory infection symptoms originally with sore throat, and cough, she felt doxycycline.  10 days or so ago she was here and had sore throat as well cough congestion her strep test was positive we treated her with antibiotics, her throat is quite a bit better but still having some soreness on the left her grandson is coming in this weekend to stay with her she wants to make sure she do not have strep.    She still has congestion upper bronchial ways and it feels blocked at times,   But she has no shortness of breath no fevers lethargy weakness or other signs or symptoms of pneumonia presently.    She is already been checked for COVID negative and she has no new viral symptoms these are slowly resolving symptoms.  This week      Sore Throat   Associated symptoms include coughing.   Cough  Associated symptoms include a sore throat.       Objective   Vital Signs:  /60   Pulse 73   Temp 97 °F (36.1 °C) (Infrared)   Resp 12   Ht 155.6 cm (61.25\")   Wt 99.6 kg (219 lb 8 oz)   SpO2 98%   BMI 41.14 kg/m²   Estimated body mass index is 41.14 kg/m² as calculated from the following:    Height as of this encounter: 155.6 cm (61.25\").    Weight as of this encounter: 99.6 kg (219 lb 8 oz).          Physical Exam  Constitutional:       General: She is not in acute distress.     Appearance: Normal appearance. She is not ill-appearing, toxic-appearing or diaphoretic.      Comments: Pleasant no distress   HENT:      Mouth/Throat:      Comments: Pharynx clear uvula midline no abscess speech clear no cervical adenopathy turbinates congested TMs are clear  Eyes:      Conjunctiva/sclera: Conjunctivae normal.      Pupils: Pupils are equal, round, and reactive to light.   Cardiovascular: "      Rate and Rhythm: Normal rate and regular rhythm.   Pulmonary:      Effort: Pulmonary effort is normal.      Breath sounds: Normal breath sounds.      Comments: Clear unlabored respirations less than 20  No expiratory crackles no rhonchi,  Speech is clear able to complete full sentences without dyspnea.  Skin:     General: Skin is warm and dry.   Neurological:      General: No focal deficit present.      Mental Status: She is alert and oriented to person, place, and time.   Psychiatric:         Mood and Affect: Mood normal.         Behavior: Behavior normal.         Thought Content: Thought content normal.         Judgment: Judgment normal.        Result Review :                Assessment and Plan   Diagnoses and all orders for this visit:    1. Strep throat (Primary)    2. Sorethroat    3. Post-viral cough syndrome    Other orders  -     azithromycin (Zithromax Z-Arnold) 250 MG tablet; Take 2 tablets the first day, then 1 tablet daily for 4 days.  Dispense: 6 tablet; Refill: 0  -     fluconazole (Diflucan) 150 MG tablet; Take 1 tablet by mouth 1 (One) Time for 1 dose.  Dispense: 1 tablet; Refill: 1             Follow Up {Instructions Charge Capture  Follow-up Communications :23}  No follow-ups on file.  Patient was given instructions and counseling regarding her condition or for health maintenance advice. Please see specific information pulled into the AVS if appropriate.     Patient has postviral cough congestion, strep is still positive either we did not completely resolve her strep or reexposures or carrier status she is never had strep before    Zithromax

## 2022-06-13 NOTE — TELEPHONE ENCOUNTER
Caller: Mendy Woods    Relationship: Self    Best call back number: [phone]    Requested Prescriptions:   Requested Prescriptions     Pending Prescriptions Disp Refills   • Insulin Lispro, 1 Unit Dial, (HumaLOG KwikPen) 100 UNIT/ML solution pen-injector 10 pen 1     Si units 3 times a day with meals.        Pharmacy where request should be sent: [facility]/PHARMACY #[id number] [place], [place] - [address]. AT [facility] [phone] [facility] [phone]      Additional details provided by patient: PATIENT HAS A WEEK LEFT. PATIENT WOULD LIKE A 3 MOS PRESCRIPTION    Does the patient have less than a 3 day supply:  [] Yes  [x] No    Chiki Stewart Rep   22 15:46 EDT

## 2022-06-14 RX ORDER — INSULIN LISPRO 100 [IU]/ML
INJECTION, SOLUTION INTRAVENOUS; SUBCUTANEOUS
Qty: 10 PEN | Refills: 1 | Status: SHIPPED | OUTPATIENT
Start: 2022-06-14 | End: 2022-08-18 | Stop reason: SDUPTHER

## 2022-06-20 ENCOUNTER — HOSPITAL ENCOUNTER (OUTPATIENT)
Dept: DIABETES SERVICES | Facility: HOSPITAL | Age: 72
Discharge: HOME | End: 2022-06-20
Admitting: NURSE PRACTITIONER

## 2022-06-20 PROCEDURE — 97802 MEDICAL NUTRITION INDIV IN: CPT

## 2022-06-20 NOTE — CONSULTS
"Mrs. Jah Merritt was seen today by Registered Dietitian for Diabetes diet education. Consistent with the ADA’s standards of care, a comprehensive assessment/training record has been sent to medical records to scan and associate with this encounter.    Patient last seen in October. Started seeing Nephrology and Cardiology. Has been trying to eat \"kidney diet\" and researching foods good for her kidney. Has been cutting back on carbs, beef, limiting fried foods, watching portions.     Typically eating 3 meals day, requested food diary on intake form brought in with blood sugar readings  mg/dl over 2 day period. TIR per Hien 82% over last 7days, 80% TIR x 90 days. sensor usage >95% and 20+ scans/day. Carbohydrate counting reviewed. Rule of 15 reviewed. Discussed how nutrient recommendations for kidneys may change based on labs.     Mrs. Jah Merritt has been encouraged to call our office with questions or additional education needs. Please place referral for additional services or follow-up should need arise.    Thank you for the referral.    "

## 2022-06-21 ENCOUNTER — PATIENT OUTREACH (OUTPATIENT)
Dept: CASE MANAGEMENT | Facility: OTHER | Age: 72
End: 2022-06-21

## 2022-06-21 NOTE — OUTREACH NOTE
AMBULATORY CASE MANAGEMENT NOTE    Name and Relationship of Patient/Support Person: Mendy Woods - Self    Patient Outreach    Introduced self, explained ACM RN role and provided contact information. Spoke with patient regarding health and wellness. Patient continues to work with nutritionist. She is gaining a greater understanding of how to manage her blood sugars and is learning to balance sugars and carbs to prevent cortisol spikes. Patient's blood pressure is in a normal range. Patient is monitoring blood sugar and weight regularly. Patient also walks three times weekly at this time for exercise. ACM reviewed with patient to get 20 minutes of exercise daily to boost energy and to ensure she is drinking plenty of water. Patient verbalized understanding. No further needs at this time. Follow up review scheduled in 1 month. AD filed. Twist active. AWV information sent via mail.     Education Documentation  Benefits, taught by Shayla Coates RN at 6/21/2022  6:41 PM.  Learner: Patient  Readiness: Acceptance  Method: Teach Back  Response: Verbalizes Understanding    Monitoring Carbohydrate Intake, taught by Shayla Coates RN at 6/21/2022  6:41 PM.  Learner: Patient  Readiness: Acceptance  Method: Teach Back  Response: Verbalizes Understanding    Healthy Food Choices, taught by Shayla Coates RN at 6/21/2022  6:41 PM.  Learner: Patient  Readiness: Acceptance  Method: Teach Back  Response: Verbalizes Understanding    Carbohydrate-Containing Foods, taught by Shayla Coates RN at 6/21/2022  6:41 PM.  Learner: Patient  Readiness: Acceptance  Method: Teach Back  Response: Verbalizes Understanding    Importance of Glycemic Management, taught by Shayla Coates RN at 6/21/2022  6:41 PM.  Learner: Patient  Readiness: Acceptance  Method: Teach Back  Response: Verbalizes Understanding    Blood Glucose Monitoring, taught by Shayla Coates RN at 6/21/2022  6:41 PM.  Learner: Patient  Readiness: Acceptance  Method:  Teach Back  Response: Verbalizes Understanding    Blood Glucose Goal, taught by Shayla Coates, RN at 6/21/2022  6:41 PM.  Learner: Patient  Readiness: Acceptance  Method: Teach Back  Response: Verbalizes Understanding          SHAYLA VELASQUEZ  Ambulatory Case Management    6/21/2022, 18:43 EDT

## 2022-06-23 ENCOUNTER — TELEPHONE (OUTPATIENT)
Dept: FAMILY MEDICINE CLINIC | Facility: CLINIC | Age: 72
End: 2022-06-23

## 2022-06-23 NOTE — TELEPHONE ENCOUNTER
PATIENT WANTS TO KNOW IF THERE IS A LOCAL [facility] PROVIDER THAT WOULD HELP HER WITH HEARING AIDS         PLEASE ADVISE  Mendy Woods (Self) [phone] (H)

## 2022-06-24 DIAGNOSIS — H91.90 HEARING LOSS, UNSPECIFIED HEARING LOSS TYPE, UNSPECIFIED LATERALITY: Primary | ICD-10-CM

## 2022-06-29 ENCOUNTER — TELEPHONE (OUTPATIENT)
Dept: FAMILY MEDICINE CLINIC | Facility: CLINIC | Age: 72
End: 2022-06-29

## 2022-06-29 RX ORDER — INSULIN GLARGINE 100 [IU]/ML
INJECTION, SOLUTION SUBCUTANEOUS
Qty: 5 PEN | Refills: 3 | Status: CANCELLED | OUTPATIENT
Start: 2022-06-29

## 2022-06-29 RX ORDER — LEVOTHYROXINE SODIUM 137 UG/1
137 TABLET ORAL DAILY
Qty: 90 TABLET | Refills: 1 | Status: CANCELLED | OUTPATIENT
Start: 2022-06-29

## 2022-06-29 NOTE — TELEPHONE ENCOUNTER
[facility] [address] is her new Pharmacy, She didn't inform me of this until I had already sent her refills in with her old pharmacy.

## 2022-06-29 NOTE — TELEPHONE ENCOUNTER
Caller: Mendy Woods    Relationship: Self    Best call back number: [phone]      Requested Prescriptions:   Requested Prescriptions     Pending Prescriptions Disp Refills   • Insulin Glargine (Lantus SoloStar) 100 UNIT/ML injection pen 5 pen 3     Sig: INJECT 45 UNITS DAILY   • levothyroxine (Synthroid) 137 MCG tablet 90 tablet 1     Sig: Take 1 tablet by mouth Daily.        Pharmacy where request should be sent: [facility] [address] [address] - [phone]  - [phone] FX     Additional details provided by patient: PATIENT IS NEEDING THESE REFILLED AND IS WANTING ALL MEDICATIONS GOING FORWARD SENT TO THIS PHARMACY INSTEAD. SHE STATES THAT SHE CAN NOT TAKE GENERIC ON THE SYNTHROID NEEDS TO BE NAME BRAND ONLY.    Does the patient have less than a 3 day supply:  [x] Yes  [] No    Kyleigh Mendez, RANDY   06/29/22 11:13 EDT

## 2022-06-29 NOTE — TELEPHONE ENCOUNTER
Spoke to patient and she is asking for a referral for the endo, and I advised her for this question of her Taraso  To speak to her cardiology since he wrote it.

## 2022-06-29 NOTE — TELEPHONE ENCOUNTER
PATIENT IS CALLING IN SHE IS ASKING IF IT IS OK TO TAKE TWO OF THE 12.5  CARVEDILOL THAT SHE STILL HAS TO GET TO THE [facility] UNTIL SHE CAN REFILL FOR THE 25[facility] ON 07/16/22.      PLEASE ADVISE    CALLBACK NUMBER IS  [phone]

## 2022-06-30 RX ORDER — INSULIN GLARGINE 100 [IU]/ML
INJECTION, SOLUTION SUBCUTANEOUS
Qty: 5 PEN | Refills: 3 | Status: SHIPPED | OUTPATIENT
Start: 2022-06-30 | End: 2022-08-18 | Stop reason: SDUPTHER

## 2022-06-30 RX ORDER — LEVOTHYROXINE SODIUM 137 UG/1
137 TABLET ORAL DAILY
Qty: 90 TABLET | Refills: 1 | Status: SHIPPED | OUTPATIENT
Start: 2022-06-30 | End: 2022-06-30

## 2022-06-30 RX ORDER — LEVOTHYROXINE SODIUM 137 MCG
137 TABLET ORAL DAILY
Qty: 90 TABLET | Refills: 1 | Status: SHIPPED | OUTPATIENT
Start: 2022-06-30 | End: 2022-10-04

## 2022-07-06 ENCOUNTER — OFFICE VISIT (OUTPATIENT)
Dept: CARDIOLOGY | Facility: CLINIC | Age: 72
End: 2022-07-06

## 2022-07-06 VITALS
HEART RATE: 57 BPM | WEIGHT: 223.4 LBS | SYSTOLIC BLOOD PRESSURE: 147 MMHG | BODY MASS INDEX: 42.18 KG/M2 | HEIGHT: 61 IN | DIASTOLIC BLOOD PRESSURE: 76 MMHG

## 2022-07-06 DIAGNOSIS — E66.01 MORBID OBESITY WITH BMI OF 40.0-44.9, ADULT: ICD-10-CM

## 2022-07-06 DIAGNOSIS — E78.5 HYPERLIPIDEMIA, UNSPECIFIED HYPERLIPIDEMIA TYPE: ICD-10-CM

## 2022-07-06 DIAGNOSIS — I12.9 BENIGN HYPERTENSION WITH CKD (CHRONIC KIDNEY DISEASE) STAGE III: Primary | ICD-10-CM

## 2022-07-06 DIAGNOSIS — N18.30 BENIGN HYPERTENSION WITH CKD (CHRONIC KIDNEY DISEASE) STAGE III: Primary | ICD-10-CM

## 2022-07-06 PROCEDURE — 99214 OFFICE O/P EST MOD 30 MIN: CPT | Performed by: INTERNAL MEDICINE

## 2022-07-06 PROCEDURE — 93000 ELECTROCARDIOGRAM COMPLETE: CPT | Performed by: INTERNAL MEDICINE

## 2022-07-06 NOTE — PROGRESS NOTES
[facility] Follow Up Office Note     Encounter Date:22  Patient:Mendy Woods  :1950  MRN:[id number]      Chief Complaint:   Chief Complaint   Patient presents with   • Hypertension     3 month f/u       History of Presenting Illness:      Ms. Marrero is a 72 y.o. woman with past medical history notable for hypertension, mixed hyperlipidemia, morbid obesity, diabetes type 2 on insulin therapy, and chronic kidney disease stage IIIa who presents to our office for scheduled follow up.  Overall blood pressure is doing reasonable.  She does occasionally take an extra losartan at night when her blood pressure is getting high but in general she is doing fairly well.        Review of Systems:  Review of Systems   Constitutional: Positive for malaise/fatigue and weight gain.   HENT: Negative.    Eyes: Negative.    Cardiovascular: Positive for dyspnea on exertion and leg swelling.   Respiratory: Positive for shortness of breath.    Endocrine: Negative.    Hematologic/Lymphatic: Negative.    Skin: Negative.    Musculoskeletal: Negative.    Gastrointestinal: Negative.    Genitourinary: Negative.    Neurological: Negative.    Psychiatric/Behavioral: Negative.    Allergic/Immunologic: Negative.        Current Outpatient Medications on File Prior to Visit   Medication Sig Dispense Refill   • aspirin 81 MG EC tablet Take  by mouth.     • atorvastatin (LIPITOR) 80 MG tablet TAKE 1 TABLET BY MOUTH EVERY DAY 90 tablet 3   • carvedilol (COREG) 25 MG tablet Take 1 tablet by mouth 2 (Two) Times a Day With Meals. 180 tablet 3   • cloNIDine (CATAPRES) 0.1 MG tablet Take 1 tablet by mouth 2 (Two) Times a Day. 180 tablet 3   • Cobalamin Combinations (B-12) 100-5000 MCG sublingual tablet Place  under the tongue.     • Continuous Blood Gluc  (FreeStyle Hien 14 Day [place]) device 1 KIT EVERY 3 (THREE) MONTHS. 1 each 1   • Continuous Blood Gluc  (FreeStyle Hien 2 [place]) device 1 each Take As  Directed. 1 each 0   • Continuous Blood Gluc Sensor (FreeStyle Hien 2 Sensor) misc 1 application Every 14 (Fourteen) Days. 6 each 3   • ferrous sulfate 324 (65 Fe) MG tablet delayed-release EC tablet Take 324 mg by mouth 2 (Two) Times a Day With Meals.     • FREESTYLE LITE test strip USE 3 TIMES A DAY AS DIRECTED  3   • furosemide (LASIX) 40 MG tablet Take 0.5 tablets by mouth Daily. (Patient taking differently: Take 20 mg by mouth As Needed. 03/30/22-Every other day) 30 tablet 11   • Insulin Glargine (Lantus SoloStar) 100 UNIT/ML injection pen INJECT 45 UNITS DAILY 5 pen 3   • Insulin Lispro, 1 Unit Dial, (HumaLOG KwikPen) 100 UNIT/ML solution pen-injector 7 units 3 times a day with meals. 10 pen 1   • Insulin Pen Needle (BD Pen Needle Neli U/F) 32G X 4 MM misc 1 Device 2 (two) times a day. 200 each 1   • losartan (COZAAR) 25 MG tablet Take 1 tablet by mouth Daily. 180 tablet 3   • Synthroid 137 MCG tablet Take 1 tablet by mouth Daily. 90 tablet 1   • Vitamin D, Cholecalciferol, (CHOLECALCIFEROL) 10 MCG (400 UNIT) tablet Take 400 Units by mouth Daily.     • vitamin E 100 UNIT capsule Take 100 Units by mouth Daily.     • Diclofenac Sodium (VOLTAREN) 1 % gel gel Apply 4 g topically to the appropriate area as directed 4 (Four) Times a Day As Needed.     • nystatin (MYCOSTATIN) 100,000 unit/mL suspension Swish and swallow 5 mL 4 (Four) Times a Day. 120 mL 1   • [DISCONTINUED] amoxicillin (AMOXIL) 500 MG capsule Take 1 capsule by mouth Daily. 10 capsule 0   • [DISCONTINUED] azithromycin (Zithromax Z-Arnold) 250 MG tablet Take 2 tablets the first day, then 1 tablet daily for 4 days. 6 tablet 0   • [DISCONTINUED] levocetirizine (XYZAL) 5 MG tablet Take 5 mg by mouth As Needed.       No current facility-administered medications on file prior to visit.       Allergies   Allergen Reactions   • Sulfa Antibiotics Other (See Comments)     Facial swelling   • Sulfamethazine Swelling       Past Medical History:   Diagnosis Date   •  "Abnormal blood chemistry test    • Allergic rhinitis    • Anemia, B12 deficiency    • Arthritis    • Cutaneous lupus erythematosus    • Dermatitis    • Disorder of kidney and ureter    • Edema of optic nerve    • Herpes zoster    • Hyperlipidemia    • Hypertension    • Laryngitis    • Low back pain    • Pernicious anemia    • Renal failure    • Sarcoidosis    • Skin rash    • Tonsillitis    • Type 1 diabetes mellitus (HCC)    • Type 2 diabetes mellitus, controlled (HCC)    • Vitamin B deficiency        Past Surgical History:   Procedure Laterality Date   • COLONOSCOPY  2007    Normal.   • LYMPH NODE BIOPSY      sarcoid       Social History     Socioeconomic History   • Marital status:    Tobacco Use   • Smoking status: Never Smoker   • Smokeless tobacco: Never Used   • Tobacco comment: occas caffeine use    Vaping Use   • Vaping Use: Never used   Substance and Sexual Activity   • Alcohol use: Yes     Comment: rarely   • Drug use: No   • Sexual activity: Yes     Partners: Male     Comment:        Family History   Problem Relation Age of Onset   • Thyroid disease Mother    • Diabetes Father    • Heart attack Father    • Hashimoto's thyroiditis Sister    • Hashimoto's thyroiditis Daughter    • Diabetes Paternal Grandmother    • Hashimoto's thyroiditis Sister    • Hashimoto's thyroiditis Daughter    • Cancer Paternal Grandfather        The following portions of the patient's history were reviewed and updated as appropriate: allergies, current medications, past family history, past medical history, past social history, past surgical history and problem list.       Objective:       Vitals:    07/06/22 1128   BP: 147/76   BP Location: Left arm   Patient Position: Sitting   Pulse: 57   Weight: 101 kg (223 lb 6.4 oz)   Height: 155.6 cm (61.25\")       Body mass index is 41.87 kg/m².    Physical Exam:  Constitutional: Well appearing, Well-developed, No acute distress   HENT: Oropharynx clear and membrane " moist  Eyes: Normal conjunctiva, no sclera icterus.  Neck: Supple, no carotid bruit bilaterally.  Cardiovascular: Regular rate and rhythm, No Murmur, Trace bilateral lower extremity edema.  Pulmonary: Normal respiratory effort, Normal lung sounds, no wheezing.  Abdominal: Soft, nontender, no hepatosplenomegaly, liver is non-pulsatile.  Neurological: Alert and orient x 3.   Skin: Warm, dry, no ecchymosis, no rash.  Psych: Appropriate mood and affect. Normal judgment and insight.      Lab Results   Component Value Date     05/03/2022     03/22/2022    K 4.6 05/03/2022    K 5.1 03/22/2022     05/03/2022     03/22/2022    CO2 24.5 05/03/2022    CO2 22.5 03/22/2022    BUN 59 (H) 05/03/2022    BUN 38 (H) 03/22/2022    CREATININE 2.60 (H) 05/03/2022    CREATININE 2.14 (H) 03/22/2022    EGFRIFNONA 20 (L) 02/18/2022    EGFRIFNONA 26 (L) 01/25/2022    EGFRIFAFRI 23 (L) 02/18/2022    EGFRIFAFRI 30 (L) 11/18/2021    GLUCOSE 145 (H) 05/03/2022    GLUCOSE 105 (H) 03/22/2022    CALCIUM 9.5 05/03/2022    CALCIUM 9.8 03/22/2022    PROTENTOTREF 7.4 02/18/2022    PROTENTOTREF 7.2 11/18/2021    ALBUMIN 4.30 05/03/2022    ALBUMIN 4.1 02/18/2022    BILITOT 0.4 02/18/2022    BILITOT 0.4 01/10/2022    AST 16 02/18/2022    AST 14 01/10/2022    ALT 11 02/18/2022    ALT 13 01/10/2022     Lab Results   Component Value Date    WBC 8.04 01/10/2022    WBC 11.52 (H) 06/08/2021    HGB 12.8 01/10/2022    HGB 13.3 06/08/2021    HCT 40.3 01/10/2022    HCT 40.5 06/08/2021    MCV 89.4 01/10/2022    MCV 87.3 06/08/2021     01/10/2022     06/08/2021     Lab Results   Component Value Date    CHOL 148 10/23/2020    CHOL 177 10/03/2018    TRIG 145 07/21/2021    TRIG 146 04/20/2021    HDL 47 07/21/2021    HDL 51 04/20/2021    LDL 94 07/21/2021    LDL 93 04/20/2021     Lab Results   Component Value Date    PROBNP 1,444.0 (H) 01/10/2022    PROBNP 2,004 (H) 12/06/2021     Lab Results   Component Value Date    TROPONINT 0.013  01/10/2022     Lab Results   Component Value Date    TSH 0.918 01/10/2022    TSH 0.850 07/21/2021         ECG 12 Lead    Date/Time: 7/6/2022 12:05 PM  Performed by: Yordan Fuchs MD  Authorized by: Yordan Fuchs MD   Comparison: compared with previous ECG from 3/30/2022  Similar to previous ECG  Rhythm: sinus rhythm  Other findings: low voltage        Holter monitor 1/12/2022:  · A normal monitor study.  · Underlying heart rhythm was sinus rhythm with an average heart rate of 66 bpm and a heart rate range of 51 bpm up to 96 bpm  · 1 episode of second-degree AV block Mobitz type I (Wenckebach) at 10:30 in the morning  · No symptoms reported during study.    Echocardiogram 12/20/2021:  · Estimated right ventricular systolic pressure from tricuspid regurgitation is normal (<35 mmHg). Calculated right ventricular systolic pressure from tricuspid regurgitation is 13 mmHg.  · Left ventricular wall thickness is consistent with mild concentric hypertrophy.  · Estimated left ventricular EF = 69% Left ventricular systolic function is normal.  · Left ventricular diastolic function was normal.  · Mild dilation of the aortic root is present.            Assessment:          Diagnosis Plan   1. Benign hypertension with CKD (chronic kidney disease) stage III ([facility])  ECG 12 Lead   2. Morbid obesity with BMI of 40.0-44.9, adult ([facility])     3. Hyperlipidemia, unspecified hyperlipidemia type            Plan:       Ms. Marrero is a 72 y.o. woman with past medical history notable for hypertension, mixed hyperlipidemia, morbid obesity, diabetes type 2 on insulin therapy, and chronic kidney disease stage IIIa who presents to our office for scheduled follow up.  Overall patient doing reasonably well I would continue the current medical regimen.  Blood pressure and kidney function seems to be reasonably stable.  No changes needed at this time we will see back in 6 months    Hypertension/chronic kidney disease stage  IV:  · Continue carvedilol 25 mg twice daily  · Continue 40 mg Lasix as needed  · Continue losartan daily with an extra evening dose if blood pressure actually high that  · Continue clonidine but can stop if blood pressure gets better    Mixed hyperlipidemia:  · Continue high potency statin  · Lipid panel 7/2021 demonstrates no controlled LDL and total cholesterol  · CMP 5/2022 demonstrates normal ALT and AST    Follow Up:  6 Months    Thank you for allowing me to participate in the care of Mendy Woods. Feel free to contact me directly with any further questions or concerns.    Yordan Fuchs MD  [facility]  07/06/22  12:07 EDT

## 2022-07-08 ENCOUNTER — TELEPHONE (OUTPATIENT)
Dept: ENDOCRINOLOGY | Facility: CLINIC | Age: 72
End: 2022-07-08

## 2022-07-08 NOTE — TELEPHONE ENCOUNTER
Wilbert called to see if we would call the pt to see if there is a reason or barriers of why she is not taking her medications regularly

## 2022-07-08 NOTE — TELEPHONE ENCOUNTER
We havent seen the pt since 11/9/2020.  It appears she is seeing another endocrinology office for care

## 2022-07-12 ENCOUNTER — TELEPHONE (OUTPATIENT)
Dept: FAMILY MEDICINE CLINIC | Facility: CLINIC | Age: 72
End: 2022-07-12

## 2022-07-12 NOTE — TELEPHONE ENCOUNTER
The brand was sent 06/30 and this is likely why the cost is increased. She needs to check with pharmacy and insurance on pricing.

## 2022-07-12 NOTE — TELEPHONE ENCOUNTER
Caller: Mendy Woods    Relationship: Self    Best call back number: [phone]     What is the best time to reach you: ANY TIME    Who are you requesting to speak with (clinical staff, provider,  specific staff member): CLINICAL STAFF    What was the call regarding: PATIENT CALLED STATING THE PHARMACY IS REQUESTING WE RESEND PATIENT'S Synthroid 137 MCG tablet PRESCRIPTION AND PUT 'DO NOT SUBSTITUTE' RIGHT NOW HER COPAY IS OVER $100 FOR THE MEDICATION AND SHE USUALLY PAYS $7-9 FOR THIS PRESCRIPTION.    PLEASE ADVISE    [facility] [address] [place] [address] - [phone]  - [phone] FX    Do you require a callback: YES

## 2022-07-19 NOTE — PROGRESS NOTES
Date of Office Visit: 2022  Encounter Provider: BRIANNA Ramirez  Place of Service: [facility] CARDIOLOGY  Patient Name: Mendy Woods  :1950    Chief Complaint   Patient presents with   • Hypertension   :     HPI: Mendy Woods is a 71 y.o. female who is a patient of Dr. Fuchs and is new to me today.  She first started seeing us in September where she was having some dyspnea on exertion, leg edema and elevated blood pressures.  She has risk factors such as hypertension, type 1 diabetes mellitus and obesity.  She had previously been on losartan but due to her worsening kidney function this was changed over to amlodipine.  She noted some increased swelling since taking that and it was stopped.  She had tried a number of agents but they ended up ultimately restarting her losartan.  She had a proBNP done which was elevated and we scheduled her for an echo.  We also switched her metoprolol over to carvedilol in hopes to control her blood pressure little better.    She came back a month later and felt like she was more bloated and retaining fluid.  Her echo was unremarkable except some mild LVH. We put her on a little low dose diuretic and her creatine stabalized.  She is here for 3-month follow-up.    Overall she is doing better with her blood pressure.  Today in the office is 138/82.  She has been checking it 3 times a day at home and has been getting about the same.  She still has some swelling in her lower extremities but it is getting better.  She is down 3 pounds from her appointment in January.  Her most recent kidney labs appeared stable her creatinine was actually a little bit improved at 2.1.  She did see a new endocrinologist and has a continuous glucose monitor.  She is a little nervous about her blood sugars as she seems to be having a lot of up and downs.  Previous testing and notes have been reviewed by me.   Past Medical History:   Diagnosis  Date   • Abnormal blood chemistry test    • Allergic rhinitis    • Anemia, B12 deficiency    • Arthritis    • Cutaneous lupus erythematosus    • Dermatitis    • Disorder of kidney and ureter    • Edema of optic nerve    • Herpes zoster    • Hyperlipidemia    • Hypertension    • Laryngitis    • Low back pain    • Pernicious anemia    • Renal failure    • Sarcoidosis    • Skin rash    • Tonsillitis    • Type 1 diabetes mellitus (HCC)    • Type 2 diabetes mellitus, controlled (HCC)    • Vitamin B deficiency        Past Surgical History:   Procedure Laterality Date   • COLONOSCOPY  2007    Normal.   • LYMPH NODE BIOPSY      sarcoid       Social History     Socioeconomic History   • Marital status:    Tobacco Use   • Smoking status: Never Smoker   • Smokeless tobacco: Never Used   • Tobacco comment: occas caffeine use    Vaping Use   • Vaping Use: Never used   Substance and Sexual Activity   • Alcohol use: Yes     Comment: rarely   • Drug use: No   • Sexual activity: Yes     Partners: Male     Comment:        Family History   Problem Relation Age of Onset   • Thyroid disease Mother    • Diabetes Father    • Heart attack Father    • Hashimoto's thyroiditis Sister    • Hashimoto's thyroiditis Daughter    • Diabetes Paternal Grandmother    • Hashimoto's thyroiditis Sister    • Hashimoto's thyroiditis Daughter    • Cancer Paternal Grandfather        Review of Systems   Constitutional: Negative for diaphoresis and malaise/fatigue.   Cardiovascular: Positive for leg swelling. Negative for chest pain, claudication, dyspnea on exertion, irregular heartbeat, near-syncope, orthopnea, palpitations, paroxysmal nocturnal dyspnea and syncope.   Respiratory: Negative for cough, shortness of breath and sleep disturbances due to breathing.    Musculoskeletal: Negative for falls.   Neurological: Negative for dizziness and weakness.   Psychiatric/Behavioral: Negative for altered mental status and substance abuse.        Allergies   Allergen Reactions   • Sulfa Antibiotics Other (See Comments)     Facial swelling   • Sulfamethazine Swelling         Current Outpatient Medications:   •  aspirin 81 MG EC tablet, Take  by mouth., Disp: , Rfl:   •  atorvastatin (LIPITOR) 80 MG tablet, TAKE 1 TABLET BY MOUTH EVERY DAY, Disp: 90 tablet, Rfl: 3  •  carvedilol (COREG) 25 MG tablet, Take 1 tablet by mouth 2 (Two) Times a Day With Meals., Disp: 180 tablet, Rfl: 3  •  cloNIDine (CATAPRES) 0.1 MG tablet, Take 1 tablet by mouth 2 (Two) Times a Day., Disp: 180 tablet, Rfl: 3  •  Cobalamin Combinations (B-12) 100-5000 MCG sublingual tablet, Place  under the tongue., Disp: , Rfl:   •  Continuous Blood Gluc  (FreeStyle Hien 14 Day [place]) device, 1 KIT EVERY 3 (THREE) MONTHS., Disp: 1 each, Rfl: 1  •  Continuous Blood Gluc  (FreeStyle Hien 2 [place]) device, 1 each Take As Directed., Disp: 1 each, Rfl: 0  •  Continuous Blood Gluc Sensor (FreeStyle [place] 2 Sensor) misc, 1 application Every 14 (Fourteen) Days., Disp: 6 each, Rfl: 3  •  Diclofenac Sodium (VOLTAREN) 1 % gel gel, Apply 4 g topically to the appropriate area as directed 4 (Four) Times a Day As Needed., Disp: , Rfl:   •  ferrous sulfate 324 (65 Fe) MG tablet delayed-release EC tablet, Take 324 mg by mouth 2 (Two) Times a Day With Meals., Disp: , Rfl:   •  FREESTYLE LITE test strip, USE 3 TIMES A DAY AS DIRECTED, Disp: , Rfl: 3  •  furosemide (LASIX) 40 MG tablet, Take 0.5 tablets by mouth Daily. (Patient taking differently: Take 20 mg by mouth Daily. 03/30/22-Every other day), Disp: 30 tablet, Rfl: 11  •  Insulin Glargine (LANTUS SOLOSTAR) 100 UNIT/ML injection pen, 36 units daily (Patient taking differently: Inject 37 Units under the skin into the appropriate area as directed Daily. 36 units daily), Disp: 40 mL, Rfl: 3  •  Insulin Lispro, 1 Unit Dial, (HumaLOG KwikPen) 100 UNIT/ML solution pen-injector, 7 units 3 times a day with meals. (Patient taking differently:  "Inject 20 Units under the skin into the appropriate area as directed. 20 units 3 times a day with meals.), Disp: 10 pen, Rfl: 1  •  Insulin Pen Needle (BD Pen Needle Neli U/F) 32G X 4 MM misc, 1 Device 2 (two) times a day., Disp: 200 each, Rfl: 1  •  levocetirizine (XYZAL) 5 MG tablet, Take 5 mg by mouth As Needed., Disp: , Rfl:   •  losartan (COZAAR) 25 MG tablet, Take 1 tablet by mouth Daily., Disp: 180 tablet, Rfl: 3  •  Synthroid 137 MCG tablet, TAKE 1 TABLET BY MOUTH EVERY DAY, Disp: 90 tablet, Rfl: 1  •  triamcinolone (KENALOG) 0.1 % ointment, Apply 1 application topically to the appropriate area as directed 2 (Two) Times a Day., Disp: 15 g, Rfl: 0  •  Vitamin D, Cholecalciferol, (CHOLECALCIFEROL) 10 MCG (400 UNIT) tablet, Take 400 Units by mouth Daily., Disp: , Rfl:   •  vitamin E 100 UNIT capsule, Take 100 Units by mouth Daily., Disp: , Rfl:       Objective:     Vitals:    03/30/22 1316   BP: 138/82   Pulse: 74   Resp: 16   SpO2: 98%   Weight: 99.8 kg (220 lb)   Height: 154.9 cm (60.98\")     Body mass index is 41.6 kg/m².    PHYSICAL EXAM:    Constitutional:       General: Not in acute distress.     Appearance: Normal appearance. Well-developed.   Eyes:      Pupils: Pupils are equal, round, and reactive to light.   HENT:      Head: Normocephalic.   Neck:      Vascular: No carotid bruit or JVD.   Pulmonary:      Effort: Pulmonary effort is normal. No tachypnea.      Breath sounds: Normal breath sounds. No wheezing. No rales.   Cardiovascular:      Normal rate. Regular rhythm.      No gallop.   Pulses:     Intact distal pulses.   Edema:     Peripheral edema present.     Pretibial: bilateral trace edema of the pretibial area.     Ankle: bilateral trace edema of the ankle.     Feet: bilateral trace edema of the feet.  Abdominal:      General: Bowel sounds are normal.      Palpations: Abdomen is soft.      Tenderness: There is no abdominal tenderness.   Musculoskeletal: Normal range of motion.      Cervical back: " Normal range of motion and neck supple. No edema. Skin:     General: Skin is warm and dry.   Neurological:      Mental Status: Alert and oriented to person, place, and time.           ECG 12 Lead    Date/Time: 3/30/2022 3:09 PM  Performed by: Radha Gomez APRN  Authorized by: Radha Gomez APRN   Comparison: compared with previous ECG from 1/24/2022  Similar to previous ECG  Rhythm: sinus rhythm  Rate: normal  QRS axis: normal  Other findings: early transition    Clinical impression: normal ECG              Assessment:       Diagnosis Plan   1. Benign hypertension with CKD (chronic kidney disease) stage III ([facility])     2. Essential hypertension     3. Hyperlipidemia, unspecified hyperlipidemia type     4. Type 1 diabetes mellitus with nephropathy ([facility])     5. 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         Plan:       We talked about the importance of blood sugar control and the effect it has on her kidney function as well as her cardiac risk.  Overall her blood pressure is better her swelling is improving and her renal function appears to be stable at this time.  I am not can make any changes to her medicines today.  She is get a follow-up with the endocrinologist for her blood sugars.  And she has a scheduled lab on April 4 for nephrology.  We will bring her back in 3 months time.         Your medication list          Accurate as of March 30, 2022  3:08 PM. If you have any questions, ask your nurse or doctor.            CHANGE how you take these medications      Instructions Last Dose Given Next Dose Due   carvedilol 25 MG tablet  Commonly known as: COREG  What changed: additional instructions      Take 1 tablet by mouth 2 (Two) Times a Day With Meals.       furosemide 40 MG tablet  Commonly known as: LASIX  What changed: additional instructions      Take 0.5 tablets by mouth Daily.       Insulin Glargine 100 UNIT/ML  injection pen  Commonly known as: LANTUS SOLOSTAR  What changed:   · how much to take  · how to take this  · when to take this      36 units daily       Insulin Lispro (1 Unit Dial) 100 UNIT/ML solution pen-injector  Commonly known as: HumaLOG KwikPen  What changed:   · how much to take  · how to take this  · additional instructions      7 units 3 times a day with meals.          CONTINUE taking these medications      Instructions Last Dose Given Next Dose Due   aspirin 81 MG EC tablet      Take  by mouth.       atorvastatin 80 MG tablet  Commonly known as: LIPITOR      TAKE 1 TABLET BY MOUTH EVERY DAY       B-12 100-5000 MCG sublingual tablet      Place  under the tongue.       BD Pen Needle Neli U/F 32G X 4 MM misc  Generic drug: Insulin Pen Needle      1 Device 2 (two) times a day.       cloNIDine 0.1 MG tablet  Commonly known as: CATAPRES      Take 1 tablet by mouth 2 (Two) Times a Day.       Diclofenac Sodium 1 % gel gel  Commonly known as: VOLTAREN      Apply 4 g topically to the appropriate area as directed 4 (Four) Times a Day As Needed.       ferrous sulfate 324 (65 Fe) MG tablet delayed-release EC tablet      Take 324 mg by mouth 2 (Two) Times a Day With Meals.       FreeStyle Hien 14 Day [place] device      1 KIT EVERY 3 (THREE) MONTHS.       FreeStyle Hien 2 [place] device      1 each Take As Directed.       FreeStyle Hien 2 Sensor misc      1 application Every 14 (Fourteen) Days.       FREESTYLE LITE test strip  Generic drug: glucose blood      USE 3 TIMES A DAY AS DIRECTED       levocetirizine 5 MG tablet  Commonly known as: XYZAL      Take 5 mg by mouth As Needed.       losartan 25 MG tablet  Commonly known as: COZAAR      Take 1 tablet by mouth Daily.       Synthroid 137 MCG tablet  Generic drug: levothyroxine      TAKE 1 TABLET BY MOUTH EVERY DAY       triamcinolone 0.1 % ointment  Commonly known as: KENALOG      Apply 1 application topically to the appropriate area as directed 2 (Two) Times a  Day.       Vitamin D (Cholecalciferol) 10 MCG (400 UNIT) tablet  Commonly known as: CHOLECALCIFEROL      Take 400 Units by mouth Daily.       vitamin E 100 UNIT capsule      Take 100 Units by mouth Daily.             Where to Get Your Medications      These medications were sent to [facility]/pharmacy #[id number] - [place], [place] - [address] [facility] - [phone]  - [phone] [id number], [facility] [address]    Phone: [phone]   · carvedilol 25 MG tablet  · cloNIDine 0.1 MG tablet           As always, it has been a pleasure to participate in your patient's care.      Sincerely,     Radha REED   intact

## 2022-07-28 ENCOUNTER — PATIENT OUTREACH (OUTPATIENT)
Dept: CASE MANAGEMENT | Facility: OTHER | Age: 72
End: 2022-07-28

## 2022-07-28 NOTE — OUTREACH NOTE
AMBULATORY CASE MANAGEMENT NOTE    Name and Relationship of Patient/Support Person: Mendy Woods M - Self    Introduced self, explained ACM RN role and provided contact information. Patient states she has pain in her knees. Stiff ankles as well. Patient is planning on walking and stretching to assist. Patient is also taking Tylenol and Voltaren for pain. Patient has an appointment with Clive REED in a few weeks and plans to discuss. Patient also has new found fatigue. Patient states her blood sugar is within range at least 85% of the time. Patient states she is taking her diabetic medications regularly and denies any problems with medications. Patient currently using [facility] pharmacy in [place]. Patient only has window service at the pharmacy.     Education Documentation  No documentation found.        PANCHO VELASQUEZ  Ambulatory Case Management    7/28/2022, 15:32 EDT

## 2022-08-11 ENCOUNTER — TELEPHONE (OUTPATIENT)
Dept: FAMILY MEDICINE CLINIC | Facility: CLINIC | Age: 72
End: 2022-08-11

## 2022-08-11 DIAGNOSIS — E03.9 HYPOTHYROIDISM, UNSPECIFIED TYPE: ICD-10-CM

## 2022-08-11 DIAGNOSIS — E10.22 TYPE 1 DIABETES MELLITUS WITH STAGE 4 CHRONIC KIDNEY DISEASE: Primary | ICD-10-CM

## 2022-08-11 DIAGNOSIS — N18.4 TYPE 1 DIABETES MELLITUS WITH STAGE 4 CHRONIC KIDNEY DISEASE: Primary | ICD-10-CM

## 2022-08-11 DIAGNOSIS — E78.5 HYPERLIPIDEMIA, UNSPECIFIED HYPERLIPIDEMIA TYPE: ICD-10-CM

## 2022-08-11 DIAGNOSIS — Z79.899 MEDICATION MANAGEMENT: ICD-10-CM

## 2022-08-11 NOTE — TELEPHONE ENCOUNTER
Caller: Mendy Woods    Relationship: Self    Best call back number: [phone] (H)    PATIENT STATES HER KIDNEY DOCTOR WANTS HER TO GET LABS TODAY OR TOMORROW. THE KIDNEY SPECIALIST SENT ORDERS OVER FOR BLOOD WORK, TO OUR OFFICE YESTERDAY 08/10/22 BUT PATIENT NOTICED THESE ORDERS ARE NOT IN THE SYSTEM, AND WONDERED IF WE RECEIVED THESE LABS FAX OR NOT.       PLEASE ADVISE PATIENT ASAP

## 2022-08-11 NOTE — TELEPHONE ENCOUNTER
Spoke to patient, advised her we haven't received the lab order, but she is due for her AWV and I will do her labs orders for that.

## 2022-08-12 ENCOUNTER — LAB (OUTPATIENT)
Dept: LAB | Facility: HOSPITAL | Age: 72
End: 2022-08-12

## 2022-08-12 ENCOUNTER — APPOINTMENT (OUTPATIENT)
Dept: LAB | Facility: HOSPITAL | Age: 72
End: 2022-08-12

## 2022-08-12 ENCOUNTER — TRANSCRIBE ORDERS (OUTPATIENT)
Dept: ADMINISTRATIVE | Facility: HOSPITAL | Age: 72
End: 2022-08-12

## 2022-08-12 DIAGNOSIS — I10 ESSENTIAL HYPERTENSION, MALIGNANT: ICD-10-CM

## 2022-08-12 DIAGNOSIS — Z79.899 MEDICATION MANAGEMENT: ICD-10-CM

## 2022-08-12 DIAGNOSIS — E55.9 AVITAMINOSIS D: ICD-10-CM

## 2022-08-12 DIAGNOSIS — N18.30 STAGE 3 CHRONIC KIDNEY DISEASE, UNSPECIFIED WHETHER STAGE 3A OR 3B CKD: Primary | ICD-10-CM

## 2022-08-12 DIAGNOSIS — E03.9 HYPOTHYROIDISM, UNSPECIFIED TYPE: ICD-10-CM

## 2022-08-12 DIAGNOSIS — E10.22 TYPE 1 DIABETES MELLITUS WITH STAGE 4 CHRONIC KIDNEY DISEASE: ICD-10-CM

## 2022-08-12 DIAGNOSIS — N18.4 TYPE 1 DIABETES MELLITUS WITH STAGE 4 CHRONIC KIDNEY DISEASE: ICD-10-CM

## 2022-08-12 DIAGNOSIS — N18.30 STAGE 3 CHRONIC KIDNEY DISEASE, UNSPECIFIED WHETHER STAGE 3A OR 3B CKD: ICD-10-CM

## 2022-08-12 DIAGNOSIS — E78.5 HYPERLIPIDEMIA, UNSPECIFIED HYPERLIPIDEMIA TYPE: ICD-10-CM

## 2022-08-12 LAB
25(OH)D3 SERPL-MCNC: 38.3 NG/ML (ref 30–100)
ALBUMIN SERPL-MCNC: 4.3 G/DL (ref 3.5–5.2)
ALBUMIN UR-MCNC: 32.7 MG/DL
ANION GAP SERPL CALCULATED.3IONS-SCNC: 11 MMOL/L (ref 5–15)
BASOPHILS # BLD AUTO: 0 10*3/MM3 (ref 0–0.2)
BASOPHILS NFR BLD AUTO: 0 % (ref 0–1.5)
BUN SERPL-MCNC: 40 MG/DL (ref 8–23)
BUN/CREAT SERPL: 18 (ref 7–25)
CALCIUM SPEC-SCNC: 9 MG/DL (ref 8.6–10.5)
CALCIUM SPEC-SCNC: 9.6 MG/DL (ref 8.6–10.5)
CHLORIDE SERPL-SCNC: 107 MMOL/L (ref 98–107)
CHOLEST SERPL-MCNC: 200 MG/DL (ref 0–200)
CO2 SERPL-SCNC: 25 MMOL/L (ref 22–29)
CREAT SERPL-MCNC: 2.22 MG/DL (ref 0.57–1)
CREAT UR-MCNC: 233.1 MG/DL
DEPRECATED RDW RBC AUTO: 47.2 FL (ref 37–54)
EGFRCR SERPLBLD CKD-EPI 2021: 22.9 ML/MIN/1.73
EOSINOPHIL # BLD AUTO: 0.3 10*3/MM3 (ref 0–0.4)
EOSINOPHIL NFR BLD AUTO: 3.9 % (ref 0.3–6.2)
ERYTHROCYTE [DISTWIDTH] IN BLOOD BY AUTOMATED COUNT: 13.9 % (ref 12.3–15.4)
GLUCOSE SERPL-MCNC: 58 MG/DL (ref 65–99)
HBA1C MFR BLD: 7 % (ref 4.8–5.6)
HCT VFR BLD AUTO: 37.8 % (ref 34–46.6)
HDLC SERPL-MCNC: 42 MG/DL (ref 40–60)
HGB BLD-MCNC: 12.2 G/DL (ref 12–15.9)
IMM GRANULOCYTES # BLD AUTO: 0.04 10*3/MM3 (ref 0–0.05)
IMM GRANULOCYTES NFR BLD AUTO: 0.5 % (ref 0–0.5)
LDLC SERPL CALC-MCNC: 130 MG/DL (ref 0–100)
LDLC/HDLC SERPL: 3.01 {RATIO}
LYMPHOCYTES # BLD AUTO: 2.25 10*3/MM3 (ref 0.7–3.1)
LYMPHOCYTES NFR BLD AUTO: 29.3 % (ref 19.6–45.3)
MAGNESIUM SERPL-MCNC: 1.7 MG/DL (ref 1.6–2.4)
MCH RBC QN AUTO: 29.8 PG (ref 26.6–33)
MCHC RBC AUTO-ENTMCNC: 32.3 G/DL (ref 31.5–35.7)
MCV RBC AUTO: 92.2 FL (ref 79–97)
MICROALBUMIN/CREAT UR: 140.3 MG/G
MONOCYTES # BLD AUTO: 0.62 10*3/MM3 (ref 0.1–0.9)
MONOCYTES NFR BLD AUTO: 8.1 % (ref 5–12)
NEUTROPHILS NFR BLD AUTO: 4.47 10*3/MM3 (ref 1.7–7)
NEUTROPHILS NFR BLD AUTO: 58.2 % (ref 42.7–76)
NRBC BLD AUTO-RTO: 0 /100 WBC (ref 0–0.2)
PHOSPHATE SERPL-MCNC: 4.3 MG/DL (ref 2.5–4.5)
PLATELET # BLD AUTO: 192 10*3/MM3 (ref 140–450)
PMV BLD AUTO: 9.2 FL (ref 6–12)
POTASSIUM SERPL-SCNC: 5 MMOL/L (ref 3.5–5.2)
PTH-INTACT SERPL-MCNC: 97 PG/ML (ref 15–65)
RBC # BLD AUTO: 4.1 10*6/MM3 (ref 3.77–5.28)
SODIUM SERPL-SCNC: 143 MMOL/L (ref 136–145)
TRIGL SERPL-MCNC: 158 MG/DL (ref 0–150)
TSH SERPL DL<=0.05 MIU/L-ACNC: 0.68 UIU/ML (ref 0.27–4.2)
VLDLC SERPL-MCNC: 28 MG/DL (ref 5–40)
WBC NRBC COR # BLD: 7.68 10*3/MM3 (ref 3.4–10.8)

## 2022-08-12 PROCEDURE — 85025 COMPLETE CBC W/AUTO DIFF WBC: CPT

## 2022-08-12 PROCEDURE — 82310 ASSAY OF CALCIUM: CPT

## 2022-08-12 PROCEDURE — 84443 ASSAY THYROID STIM HORMONE: CPT

## 2022-08-12 PROCEDURE — 80069 RENAL FUNCTION PANEL: CPT

## 2022-08-12 PROCEDURE — 82043 UR ALBUMIN QUANTITATIVE: CPT

## 2022-08-12 PROCEDURE — 80061 LIPID PANEL: CPT

## 2022-08-12 PROCEDURE — 83036 HEMOGLOBIN GLYCOSYLATED A1C: CPT

## 2022-08-12 PROCEDURE — 83735 ASSAY OF MAGNESIUM: CPT

## 2022-08-12 PROCEDURE — 82306 VITAMIN D 25 HYDROXY: CPT

## 2022-08-12 PROCEDURE — 83970 ASSAY OF PARATHORMONE: CPT

## 2022-08-12 PROCEDURE — 36415 COLL VENOUS BLD VENIPUNCTURE: CPT

## 2022-08-12 PROCEDURE — 82570 ASSAY OF URINE CREATININE: CPT

## 2022-08-18 ENCOUNTER — OFFICE VISIT (OUTPATIENT)
Dept: FAMILY MEDICINE CLINIC | Facility: CLINIC | Age: 72
End: 2022-08-18

## 2022-08-18 VITALS
HEIGHT: 61 IN | TEMPERATURE: 96.2 F | OXYGEN SATURATION: 99 % | SYSTOLIC BLOOD PRESSURE: 144 MMHG | RESPIRATION RATE: 18 BRPM | DIASTOLIC BLOOD PRESSURE: 82 MMHG | BODY MASS INDEX: 42.41 KG/M2 | WEIGHT: 224.6 LBS | HEART RATE: 63 BPM

## 2022-08-18 DIAGNOSIS — Z00.00 MEDICARE ANNUAL WELLNESS VISIT, SUBSEQUENT: Primary | ICD-10-CM

## 2022-08-18 DIAGNOSIS — E11.65 TYPE 2 DIABETES MELLITUS WITH HYPERGLYCEMIA, WITH LONG-TERM CURRENT USE OF INSULIN: ICD-10-CM

## 2022-08-18 DIAGNOSIS — R53.83 FATIGUE, UNSPECIFIED TYPE: ICD-10-CM

## 2022-08-18 DIAGNOSIS — N30.00 ACUTE CYSTITIS WITHOUT HEMATURIA: ICD-10-CM

## 2022-08-18 DIAGNOSIS — Z79.4 TYPE 2 DIABETES MELLITUS WITH HYPERGLYCEMIA, WITH LONG-TERM CURRENT USE OF INSULIN: ICD-10-CM

## 2022-08-18 LAB
EXPIRATION DATE: NORMAL
INTERNAL CONTROL: NORMAL
Lab: NORMAL
SARS-COV-2 AG UPPER RESP QL IA.RAPID: NOT DETECTED

## 2022-08-18 PROCEDURE — 99214 OFFICE O/P EST MOD 30 MIN: CPT | Performed by: NURSE PRACTITIONER

## 2022-08-18 PROCEDURE — 3062F POS MACROALBUMINURIA REV: CPT | Performed by: NURSE PRACTITIONER

## 2022-08-18 PROCEDURE — 81003 URINALYSIS AUTO W/O SCOPE: CPT | Performed by: NURSE PRACTITIONER

## 2022-08-18 PROCEDURE — 1160F RVW MEDS BY RX/DR IN RCRD: CPT | Performed by: NURSE PRACTITIONER

## 2022-08-18 PROCEDURE — G0439 PPPS, SUBSEQ VISIT: HCPCS | Performed by: NURSE PRACTITIONER

## 2022-08-18 PROCEDURE — 87426 SARSCOV CORONAVIRUS AG IA: CPT | Performed by: NURSE PRACTITIONER

## 2022-08-18 PROCEDURE — 1170F FXNL STATUS ASSESSED: CPT | Performed by: NURSE PRACTITIONER

## 2022-08-18 RX ORDER — INSULIN GLARGINE 100 [IU]/ML
INJECTION, SOLUTION SUBCUTANEOUS
Qty: 15 PEN | Refills: 3 | Status: SHIPPED | OUTPATIENT
Start: 2022-08-18 | End: 2022-08-18 | Stop reason: SDUPTHER

## 2022-08-18 RX ORDER — CEPHALEXIN 500 MG/1
500 CAPSULE ORAL 2 TIMES DAILY
Qty: 14 CAPSULE | Refills: 0 | Status: SHIPPED | OUTPATIENT
Start: 2022-08-18 | End: 2023-01-17

## 2022-08-18 RX ORDER — INSULIN GLARGINE 100 [IU]/ML
38 INJECTION, SOLUTION SUBCUTANEOUS DAILY
Qty: 15 PEN | Refills: 3
Start: 2022-08-18 | End: 2023-01-17

## 2022-08-18 RX ORDER — INSULIN LISPRO 100 [IU]/ML
INJECTION, SOLUTION INTRAVENOUS; SUBCUTANEOUS
Qty: 15 PEN | Refills: 1 | Status: SHIPPED | OUTPATIENT
Start: 2022-08-18

## 2022-08-18 NOTE — PROGRESS NOTES
The ABCs of the Annual Wellness Visit  Subsequent Medicare Wellness Visit    Chief Complaint   Patient presents with   • Medicare Wellness-subsequent      Subjective    History of Present Illness:  Mendy Woods is a 72 y.o. female who presents for a Subsequent Medicare Wellness Visit.    The following portions of the patient's history were reviewed and   updated as appropriate: allergies, current medications, past family history, past medical history, past social history, past surgical history and problem list.    Compared to one year ago, the patient feels her physical   health is worse.    Compared to one year ago, the patient feels her mental   health is worse.    Recent Hospitalizations:  She was not admitted to the hospital during the last year.       Current Medical Providers:  Patient Care Team:  Felicia Duffy APRN as PCP - General (Family Medicine)  Kerwin Drake MD as Consulting Physician (Endocrinology)  Shayla Coates RN as Ambulatory  (Population Health)    Outpatient Medications Prior to Visit   Medication Sig Dispense Refill   • aspirin 81 MG EC tablet Take  by mouth.     • atorvastatin (LIPITOR) 80 MG tablet TAKE 1 TABLET BY MOUTH EVERY DAY 90 tablet 3   • carvedilol (COREG) 25 MG tablet Take 1 tablet by mouth 2 (Two) Times a Day With Meals. 180 tablet 3   • cloNIDine (CATAPRES) 0.1 MG tablet Take 1 tablet by mouth 2 (Two) Times a Day. 180 tablet 3   • Cobalamin Combinations (B-12) 100-5000 MCG sublingual tablet Place  under the tongue.     • Continuous Blood Gluc  (FreeStyle Hien 14 Day [place]) device 1 KIT EVERY 3 (THREE) MONTHS. 1 each 1   • Continuous Blood Gluc  (FreeStyle Hien 2 [place]) device 1 each Take As Directed. 1 each 0   • Continuous Blood Gluc Sensor (FreeStyle Hien 2 Sensor) misc 1 application Every 14 (Fourteen) Days. 6 each 3   • Diclofenac Sodium (VOLTAREN) 1 % gel gel Apply 4 g topically to the appropriate area as directed 4  (Four) Times a Day As Needed.     • ferrous sulfate 324 (65 Fe) MG tablet delayed-release EC tablet Take 324 mg by mouth 2 (Two) Times a Day With Meals.     • FREESTYLE LITE test strip USE 3 TIMES A DAY AS DIRECTED  3   • furosemide (LASIX) 40 MG tablet Take 0.5 tablets by mouth Daily. (Patient taking differently: Take 20 mg by mouth As Needed. 03/30/22-Every other day) 30 tablet 11   • Insulin Glargine (Lantus SoloStar) 100 UNIT/ML injection pen INJECT 45 UNITS DAILY 5 pen 3   • Insulin Lispro, 1 Unit Dial, (HumaLOG KwikPen) 100 UNIT/ML solution pen-injector 7 units 3 times a day with meals. 10 pen 1   • Insulin Pen Needle (BD Pen Needle Neli U/F) 32G X 4 MM misc 1 Device 2 (two) times a day. 200 each 1   • losartan (COZAAR) 25 MG tablet Take 1 tablet by mouth Daily. (Patient taking differently: Take 25 mg by mouth Daily. Taking twice a day) 180 tablet 3   • Synthroid 137 MCG tablet Take 1 tablet by mouth Daily. 90 tablet 1   • Vitamin D, Cholecalciferol, (CHOLECALCIFEROL) 10 MCG (400 UNIT) tablet Take 400 Units by mouth Daily.     • vitamin E 100 UNIT capsule Take 100 Units by mouth Daily.     • loratadine (CLARITIN) 10 MG tablet Take 1 tablet by mouth Daily for 30 days. 30 tablet 0   • montelukast (SINGULAIR) 10 MG tablet Take 1 tablet by mouth Every Night for 30 days. 30 tablet 0     No facility-administered medications prior to visit.       No opioid medication identified on active medication list. I have reviewed chart for other potential  high risk medication/s and harmful drug interactions in the elderly.          Aspirin is on active medication list. Aspirin use is indicated based on review of current medical condition/s. Pros and cons of this therapy have been discussed today. Benefits of this medication outweigh potential harm.  Patient has been encouraged to continue taking this medication.  .      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  ACP discussion was held with the patient during this visit. Patient has an advance directive in EMR which is still valid.           Objective    Vitals:    08/18/22 1027   BP: 144/82   BP Location: Right arm   Patient Position: Sitting   Cuff Size: Adult   Pulse: 63   Resp: 18   Temp: 96.2 °F (35.7 °C)   TempSrc: Temporal   SpO2: 99%   Weight: 102 kg (224 lb 9.6 oz)   Height: 155.6 cm (61.26\")     Estimated body mass index is 42.08 kg/m² as calculated from the following:    Height as of this encounter: 155.6 cm (61.26\").    Weight as of this encounter: 102 kg (224 lb 9.6 oz).    Class 3 Severe Obesity (BMI >=40). Obesity-related health conditions include the following: diabetes mellitus. Obesity is unchanged. BMI is is above average; BMI management plan is completed. We discussed portion control and increasing exercise.      Does the patient have evidence of cognitive impairment? No    Physical Exam  Lab Results   Component Value Date    TRIG 158 (H) 08/12/2022    HDL 42 08/12/2022     (H) 08/12/2022    VLDL 28 08/12/2022    HGBA1C 7.00 (H) 08/12/2022            HEALTH RISK ASSESSMENT    Smoking Status:  Social History     Tobacco Use   Smoking Status Never Smoker   Smokeless Tobacco Never Used   Tobacco Comment    occas caffeine use      Alcohol Consumption:  Social History     Substance and Sexual Activity   Alcohol Use Yes    Comment: rarely     Fall Risk Screen:    ODALYSADI " Fall Risk Assessment was completed, and patient is at LOW risk for falls.Assessment completed on:8/18/2022    Depression Screening:  PHQ-2/PHQ-9 Depression Screening 8/18/2022   Retired PHQ-9 Total Score -   Retired Total Score -   Little Interest or Pleasure in Doing Things 0-->not at all   Feeling Down, Depressed or Hopeless 0-->not at all   PHQ-9: Brief Depression Severity Measure Score 0       Health Habits and Functional and Cognitive Screening:  Functional & Cognitive Status 8/18/2022   Do you have difficulty preparing food and eating? No   Do you have difficulty bathing yourself, getting dressed or grooming yourself? No   Do you have difficulty using the toilet? No   Do you have difficulty moving around from place to place? No   Do you have trouble with steps or getting out of a bed or a chair? No   Current Diet Well Balanced Diet   Dental Exam Up to date   Eye Exam Up to date   Exercise (times per week) 3 times per week   Current Exercises Include Walking   Do you need help using the phone?  No   Are you deaf or do you have serious difficulty hearing?  No   Do you need help with transportation? No   Do you need help shopping? No   Do you need help preparing meals?  No   Do you need help with housework?  No   Do you need help with laundry? No   Do you need help taking your medications? No   Do you need help managing money? No   Do you ever drive or ride in a car without wearing a seat belt? No   Have you felt unusual stress, anger or loneliness in the last month? No   Who do you live with? Spouse   If you need help, do you have trouble finding someone available to you? No   Have you been bothered in the last four weeks by sexual problems? No   Do you have difficulty concentrating, remembering or making decisions? No       Age-appropriate Screening Schedule:  Refer to the list below for future screening recommendations based on patient's age, sex and/or medical conditions. Orders for these recommended tests  are listed in the plan section. The patient has been provided with a written plan.    Health Maintenance   Topic Date Due   • ZOSTER VACCINE (2 of 3) 12/18/2017   • DXA SCAN  10/05/2019   • DIABETIC FOOT EXAM  09/19/2020   • MAMMOGRAM  01/11/2023 (Originally 9/29/2019)   • INFLUENZA VACCINE  10/01/2022   • HEMOGLOBIN A1C  02/12/2023   • DIABETIC EYE EXAM  07/26/2023   • LIPID PANEL  08/12/2023   • TDAP/TD VACCINES (2 - Td or Tdap) 10/23/2027   • URINE MICROALBUMIN  Discontinued              Assessment & Plan   CMS Preventative Services Quick Reference  Risk Factors Identified During Encounter  {Medicare Wellness Risk Factors:03529}  The above risks/problems have been discussed with the patient.  Follow up actions/plans if indicated are seen below in the Assessment/Plan Section.  Pertinent information has been shared with the patient in the After Visit Summary.    There are no diagnoses linked to this encounter.    Follow Up:   No follow-ups on file.     An After Visit Summary and PPPS were made available to the patient.    {Optional Chart Navigation Links Wrapup  Review (Popup)  Advance Care Planning  Labs  CC  Problem List  Visit Diagnosis  Medications  Result Review  Imaging  Health Maintenance  Quality  BestPractice  SmartSets  SnapShot  Encounters  Notes  Media  Procedures :23}      {Time Spent-Use for E/M Coding (Optional):77716}

## 2022-08-18 NOTE — PROGRESS NOTES
The ABCs of the Annual Wellness Visit  Subsequent Medicare Wellness Visit    Chief Complaint   Patient presents with   • Medicare Wellness-subsequent      Subjective    History of Present Illness:  Mendy Woods is a 72 y.o. female who presents for a Subsequent Medicare Wellness Visit.    The patient states having elevated blood glucose readings for the last 3-days. She has been eating the same foods daily. Her readings have gone up to 360 mg/dL. She reports her readings are usually between 120 mg/dL and 150 mg/dL. The patient experienced cold sweats and shaking yesterday, and when she checked her blood glucose it was at 230 mg/dL. She reports it was the first time she experienced this. She saw her dietician 6 to 8-weeks ago and was informed she was doing well. She reports drinking 24 to 30 ounces daily. The patient denies urinating more frequently. She reports having an odor when she urines. She reports when taking furosemide she has to urine frequently and is unable to control her urine at times. She takes 7 units of lispro 3 times a day, and 38 units of Lantus once a day. The patient reports having one episode where her blood glucose dropped while she was in the toilet and she had just finished having a bowel movement.     The patient has been feeling very tired. She has been taking naps during the day. She reports sleeping 2 hours or more a day. She has been sleeping better at night for the last month, especially when she does not take furosemide.     The patient is trying to improve her diet and to avoid sodium. She states her calorie intake is usually under 1500. She is taking losartan at night and has noticed her blood pressure has improved since then.     The patient reports having knee pain. She uses Voltaren gel in the morning and takes acetaminophen 4 times a day. The combination of both have helped with her knee pain. She reports her knees are stiff when she gets out of bed and she has to be careful  when she stands up to make sure she does not fall. She has also applied heat on it to improve her pain. She has heard clicking in her knees.     The patient went to the urgent care on 08/15/2022, due to a sore throat. She was afraid of having strep throat before going to her dentist appointment. She has had a sore throat for about 4-weeks, and did have strep throat infection a couple of weeks ago. The provider at the urgent care told her it was probably due to post nasal drip from her allergies. She has been using Chloraseptic and her symptoms have improved. She did a COVID-19 test at home and it was negative on 08/15/2022. She reports when she first moved here she lasted a while with a sore throat and was diagnosed with thrush. She denies her mouth being dry when she wakes up.     The patient will see her nephrologist Dr. Jeffers this afternoon.     The patient does not see a podiatrist. She reports having nail fungus and thick toe nails. She has been using an over the counter medication for it.     The patient has received 2 COVID-19 vaccines and 2 boosters. She reports having a black/blue jasmin with the second booster. She denies having any side effects with the first 3 vaccines.     The patient denies feeling depressed. She is happy about her new grandson and spending time with him.     The following portions of the patient's history were reviewed and   updated as appropriate: allergies, current medications, past family history, past medical history, past social history, past surgical history and problem list.    Compared to one year ago, the patient feels her physical   health is worse.    Compared to one year ago, the patient feels her mental   health is worse.    Recent Hospitalizations:  She was not admitted to the hospital during the last year.       Current Medical Providers:  Patient Care Team:  Felicia Duffy APRN as PCP - General (Family Medicine)  Kerwin Drake MD as Consulting Physician  (Endocrinology)  Shayla Coates, ANDREW as Ambulatory  (Population Health)    Outpatient Medications Prior to Visit   Medication Sig Dispense Refill   • aspirin 81 MG EC tablet Take  by mouth.     • atorvastatin (LIPITOR) 80 MG tablet TAKE 1 TABLET BY MOUTH EVERY DAY 90 tablet 3   • carvedilol (COREG) 25 MG tablet Take 1 tablet by mouth 2 (Two) Times a Day With Meals. 180 tablet 3   • cloNIDine (CATAPRES) 0.1 MG tablet Take 1 tablet by mouth 2 (Two) Times a Day. 180 tablet 3   • Cobalamin Combinations (B-12) 100-5000 MCG sublingual tablet Place  under the tongue.     • Continuous Blood Gluc  (FreeStyle Hien 14 Day [place]) device 1 KIT EVERY 3 (THREE) MONTHS. 1 each 1   • Continuous Blood Gluc  (FreeStyle Hien 2 [place]) device 1 each Take As Directed. 1 each 0   • Continuous Blood Gluc Sensor (FreeStyle Hien 2 Sensor) misc 1 application Every 14 (Fourteen) Days. 6 each 3   • Diclofenac Sodium (VOLTAREN) 1 % gel gel Apply 4 g topically to the appropriate area as directed 4 (Four) Times a Day As Needed.     • ferrous sulfate 324 (65 Fe) MG tablet delayed-release EC tablet Take 324 mg by mouth 2 (Two) Times a Day With Meals.     • FREESTYLE LITE test strip USE 3 TIMES A DAY AS DIRECTED  3   • furosemide (LASIX) 40 MG tablet Take 0.5 tablets by mouth Daily. (Patient taking differently: Take 20 mg by mouth As Needed. 03/30/22-Every other day) 30 tablet 11   • Insulin Pen Needle (BD Pen Needle Neli U/F) 32G X 4 MM misc 1 Device 2 (two) times a day. 200 each 1   • losartan (COZAAR) 25 MG tablet Take 1 tablet by mouth Daily. (Patient taking differently: Take 25 mg by mouth Daily. Taking twice a day) 180 tablet 3   • Synthroid 137 MCG tablet Take 1 tablet by mouth Daily. 90 tablet 1   • Vitamin D, Cholecalciferol, (CHOLECALCIFEROL) 10 MCG (400 UNIT) tablet Take 400 Units by mouth Daily.     • vitamin E 100 UNIT capsule Take 100 Units by mouth Daily.     • Insulin Glargine (Lantus SoloStar) 100  UNIT/ML injection pen INJECT 45 UNITS DAILY 5 pen 3   • Insulin Lispro, 1 Unit Dial, (HumaLOG KwikPen) 100 UNIT/ML solution pen-injector 7 units 3 times a day with meals. 10 pen 1   • loratadine (CLARITIN) 10 MG tablet Take 1 tablet by mouth Daily for 30 days. 30 tablet 0   • montelukast (SINGULAIR) 10 MG tablet Take 1 tablet by mouth Every Night for 30 days. 30 tablet 0     No facility-administered medications prior to visit.       No opioid medication identified on active medication list. I have reviewed chart for other potential  high risk medication/s and harmful drug interactions in the elderly.          Aspirin is on active medication list. Aspirin use is indicated based on review of current medical condition/s. Pros and cons of this therapy have been discussed today. Benefits of this medication outweigh potential harm.  Patient has been encouraged to continue taking this medication.  .      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  ACP discussion was held with the patient during this visit. Patient has an advance directive in EMR which is still valid.        Objective      Review of Systems   Constitutional: Negative.    HENT: Negative.    Eyes: Negative.    Respiratory: Negative.    Cardiovascular: Negative.    Gastrointestinal: Negative.    Endocrine: Negative.    Genitourinary: Negative.    Musculoskeletal:  "Negative.    Skin: Negative.    Allergic/Immunologic: Negative.    Neurological: Negative.    Hematological: Negative.    Psychiatric/Behavioral: Negative.        Vitals:    08/18/22 1027   BP: 144/82   BP Location: Right arm   Patient Position: Sitting   Cuff Size: Adult   Pulse: 63   Resp: 18   Temp: 96.2 °F (35.7 °C)   TempSrc: Temporal   SpO2: 99%   Weight: 102 kg (224 lb 9.6 oz)   Height: 155.6 cm (61.26\")     Estimated body mass index is 42.08 kg/m² as calculated from the following:    Height as of this encounter: 155.6 cm (61.26\").    Weight as of this encounter: 102 kg (224 lb 9.6 oz).    Class 3 Severe Obesity (BMI >=40). Obesity-related health conditions include the following: diabetes mellitus. Obesity is unchanged. BMI is is above average; BMI management plan is completed. We discussed portion control and increasing exercise.      Does the patient have evidence of cognitive impairment? No    Physical Exam  Vitals reviewed.   Constitutional:       General: She is not in acute distress.     Appearance: She is well-developed. She is not diaphoretic.   HENT:      Head: Normocephalic and atraumatic.      Right Ear: Tympanic membrane, ear canal and external ear normal.      Left Ear: Tympanic membrane, ear canal and external ear normal.      Nose: Nose normal.      Mouth/Throat:      Pharynx: Uvula midline. No oropharyngeal exudate.   Cardiovascular:      Rate and Rhythm: Normal rate and regular rhythm.      Heart sounds: Normal heart sounds. No murmur heard.    No friction rub. No gallop.   Pulmonary:      Effort: Pulmonary effort is normal. No respiratory distress.      Breath sounds: Normal breath sounds. No wheezing, rhonchi or rales.   Abdominal:      General: Bowel sounds are normal. There is no distension.      Palpations: Abdomen is soft.      Tenderness: There is no abdominal tenderness.   Musculoskeletal:      Cervical back: Neck supple.   Skin:     General: Skin is warm and dry.   Neurological:      " Mental Status: She is alert and oriented to person, place, and time.   Psychiatric:         Mood and Affect: Mood and affect normal.            Lab Results   Component Value Date    TRIG 158 (H) 08/12/2022    HDL 42 08/12/2022     (H) 08/12/2022    VLDL 28 08/12/2022    HGBA1C 7.00 (H) 08/12/2022            HEALTH RISK ASSESSMENT    Smoking Status:  Social History     Tobacco Use   Smoking Status Never Smoker   Smokeless Tobacco Never Used   Tobacco Comment    occas caffeine use      Alcohol Consumption:  Social History     Substance and Sexual Activity   Alcohol Use Yes    Comment: rarely     Fall Risk Screen:    STEADI Fall Risk Assessment was completed, and patient is at LOW risk for falls.Assessment completed on:8/18/2022    Depression Screening:  PHQ-2/PHQ-9 Depression Screening 8/18/2022   Retired PHQ-9 Total Score -   Retired Total Score -   Little Interest or Pleasure in Doing Things 0-->not at all   Feeling Down, Depressed or Hopeless 0-->not at all   PHQ-9: Brief Depression Severity Measure Score 0       Health Habits and Functional and Cognitive Screening:  Functional & Cognitive Status 8/18/2022   Do you have difficulty preparing food and eating? No   Do you have difficulty bathing yourself, getting dressed or grooming yourself? No   Do you have difficulty using the toilet? No   Do you have difficulty moving around from place to place? No   Do you have trouble with steps or getting out of a bed or a chair? No   Current Diet Well Balanced Diet   Dental Exam Up to date   Eye Exam Up to date   Exercise (times per week) 3 times per week   Current Exercises Include Walking   Do you need help using the phone?  No   Are you deaf or do you have serious difficulty hearing?  No   Do you need help with transportation? No   Do you need help shopping? No   Do you need help preparing meals?  No   Do you need help with housework?  No   Do you need help with laundry? No   Do you need help taking your medications?  No   Do you need help managing money? No   Do you ever drive or ride in a car without wearing a seat belt? No   Have you felt unusual stress, anger or loneliness in the last month? No   Who do you live with? Spouse   If you need help, do you have trouble finding someone available to you? No   Have you been bothered in the last four weeks by sexual problems? No   Do you have difficulty concentrating, remembering or making decisions? No       Age-appropriate Screening Schedule:  Refer to the list below for future screening recommendations based on patient's age, sex and/or medical conditions. Orders for these recommended tests are listed in the plan section. The patient has been provided with a written plan.    Health Maintenance   Topic Date Due   • ZOSTER VACCINE (2 of 3) 12/18/2017   • DXA SCAN  10/05/2019   • DIABETIC FOOT EXAM  09/19/2020   • MAMMOGRAM  01/11/2023 (Originally 9/29/2019)   • INFLUENZA VACCINE  10/01/2022   • HEMOGLOBIN A1C  02/12/2023   • DIABETIC EYE EXAM  07/26/2023   • LIPID PANEL  08/12/2023   • TDAP/TD VACCINES (2 - Td or Tdap) 10/23/2027   • URINE MICROALBUMIN  Discontinued              Assessment & Plan   CMS Preventative Services Quick Reference  Risk Factors Identified During Encounter  Immunizations Discussed/Encouraged (specific Immunizations; Shingrix and COVID19  Obesity/Overweight   The above risks/problems have been discussed with the patient.  Follow up actions/plans if indicated are seen below in the Assessment/Plan Section.  Pertinent information has been shared with the patient in the After Visit Summary.    Diagnoses and all orders for this visit:    1. Medicare annual wellness visit, subsequent (Primary)        - We discussed her DEXA scan and we will hold off on that at this time. Her COVID-19 vaccines are up to date. She is due for a Shingrix, and she can obtain this at her local pharmacy. Her mammogram is up to date.     2. Type 2 diabetes mellitus with hyperglycemia, with  long-term current use of insulin (HCC)  - The patient will have a urinalysis today due to her decreased energy levels and elevated blood sugars, and we will proceed accordingly. She has had some hypoglycemic episodes and we will continue to monitor those glucose levels. She does have the FreeStyle sebas to check her blood glucose frequently. Her insulin has been decreased from 45 units to 38 units daily and then we discussed increasing her premeal short acting insulin injection. She can increase that by 2 units for sugars greater than 300 mg/dL. We will proceed with her diabetic foot exam today as well.  -     POC Urinalysis Dipstick, Automated  -     POCT SARS-CoV-2 Antigen SB    3. Fatigue, unspecified type  - The patient will be tested for COVID-19 today due to her decreased energy levels.   -     POC Urinalysis Dipstick, Automated  -     POCT SARS-CoV-2 Antigen SB  -     POCT SARS-CoV-2 Antigen SB    4. Dysuria  - The patient will have a urinalysis today.  -     Urine Culture - Urine, Urine, Clean Catch; Future  -     Urine Culture - Urine, Urine, Clean Catch  Ua positive for bacteria, instructed to start antibiotic, rx sent to pharmacy, follow up prn  Other orders  -     Insulin Lispro, 1 Unit Dial, (HumaLOG KwikPen) 100 UNIT/ML solution pen-injector; 7 units 3 times a day with meals.  Dispense: 15 pen; Refill: 1  -     Discontinue: Insulin Glargine (Lantus SoloStar) 100 UNIT/ML injection pen; INJECT 45 UNITS DAILY  Dispense: 15 pen; Refill: 3  -     Insulin Glargine (Lantus SoloStar) 100 UNIT/ML injection pen; Inject 38 Units under the skin into the appropriate area as directed Daily. INJECT 45 UNITS DAILY  Dispense: 15 pen; Refill: 3        Follow Up:   No follow-ups on file.     An After Visit Summary and PPPS were made available to the patient.                   Transcribed from ambient dictation for BRIANNA Baires by Marilu Tompkins.  08/18/22   17:52 EDT    Patient verbalized consent to the visit  recording.  I have personally performed the services described in this document as transcribed by the above individual, and it is both accurate and complete.  Felicia Duffy, APRN  8/19/2022  10:04 EDT

## 2022-08-19 ENCOUNTER — TRANSCRIBE ORDERS (OUTPATIENT)
Dept: ADMINISTRATIVE | Facility: HOSPITAL | Age: 72
End: 2022-08-19

## 2022-08-19 DIAGNOSIS — N18.4 CHRONIC KIDNEY DISEASE, STAGE IV (SEVERE): Primary | ICD-10-CM

## 2022-08-19 LAB
BILIRUB BLD-MCNC: NEGATIVE MG/DL
CLARITY, POC: CLEAR
COLOR UR: YELLOW
EXPIRATION DATE: ABNORMAL
EXPIRATION DATE: NORMAL
GLUCOSE UR STRIP-MCNC: NEGATIVE MG/DL
INTERNAL CONTROL: NORMAL
KETONES UR QL: NEGATIVE
LEUKOCYTE EST, POC: ABNORMAL
Lab: ABNORMAL
Lab: NORMAL
NITRITE UR-MCNC: NEGATIVE MG/ML
PH UR: 5.5 [PH] (ref 5–8)
PROT UR STRIP-MCNC: ABNORMAL MG/DL
RBC # UR STRIP: NEGATIVE /UL
SARS-COV-2 AG UPPER RESP QL IA.RAPID: NOT DETECTED
SP GR UR: 1.02 (ref 1–1.03)
UROBILINOGEN UR QL: NORMAL

## 2022-08-22 LAB
BACTERIA UR CULT: NORMAL
BACTERIA UR CULT: NORMAL

## 2022-08-26 ENCOUNTER — APPOINTMENT (OUTPATIENT)
Dept: ULTRASOUND IMAGING | Facility: HOSPITAL | Age: 72
End: 2022-08-26

## 2022-10-03 NOTE — TELEPHONE ENCOUNTER
Rx Refill Note  Requested Prescriptions     Pending Prescriptions Disp Refills   • Synthroid 137 MCG tablet [Pharmacy Med Name: SYNTHROID 137 MCG TABLET] 90 tablet 1     Sig: TAKE 1 TABLET BY MOUTH EVERY DAY      Last office visit with prescribing clinician: 8/18/2022      Next office visit with prescribing clinician: 2/23/2023            Trish Mary LPN  10/03/22, 08:45 EDT

## 2022-10-04 RX ORDER — LEVOTHYROXINE SODIUM 137 MCG
TABLET ORAL
Qty: 90 TABLET | Refills: 0 | Status: SHIPPED | OUTPATIENT
Start: 2022-10-04 | End: 2022-12-27

## 2022-10-07 ENCOUNTER — FLU SHOT (OUTPATIENT)
Dept: FAMILY MEDICINE CLINIC | Facility: CLINIC | Age: 72
End: 2022-10-07

## 2022-10-07 DIAGNOSIS — Z23 NEED FOR VACCINATION: Primary | ICD-10-CM

## 2022-10-07 PROCEDURE — G0008 ADMIN INFLUENZA VIRUS VAC: HCPCS | Performed by: NURSE PRACTITIONER

## 2022-10-07 PROCEDURE — 90662 IIV NO PRSV INCREASED AG IM: CPT | Performed by: NURSE PRACTITIONER

## 2022-10-25 DIAGNOSIS — E78.00 PURE HYPERCHOLESTEROLEMIA: ICD-10-CM

## 2022-10-25 RX ORDER — ATORVASTATIN CALCIUM 80 MG/1
TABLET, FILM COATED ORAL
Qty: 90 TABLET | Refills: 3 | Status: SHIPPED | OUTPATIENT
Start: 2022-10-25

## 2022-12-05 ENCOUNTER — OFFICE VISIT (OUTPATIENT)
Dept: FAMILY MEDICINE CLINIC | Facility: CLINIC | Age: 72
End: 2022-12-05

## 2022-12-05 VITALS
HEIGHT: 61 IN | HEART RATE: 67 BPM | DIASTOLIC BLOOD PRESSURE: 70 MMHG | TEMPERATURE: 97.1 F | SYSTOLIC BLOOD PRESSURE: 120 MMHG | RESPIRATION RATE: 18 BRPM | WEIGHT: 224 LBS | BODY MASS INDEX: 42.29 KG/M2 | OXYGEN SATURATION: 98 %

## 2022-12-05 DIAGNOSIS — R05.1 ACUTE COUGH: Primary | ICD-10-CM

## 2022-12-05 PROCEDURE — 99213 OFFICE O/P EST LOW 20 MIN: CPT | Performed by: FAMILY MEDICINE

## 2022-12-05 RX ORDER — BENZONATATE 200 MG/1
200 CAPSULE ORAL 3 TIMES DAILY PRN
Qty: 30 CAPSULE | Refills: 1 | Status: SHIPPED | OUTPATIENT
Start: 2022-12-05 | End: 2023-01-17

## 2022-12-05 RX ORDER — HYDROCODONE BITARTRATE AND HOMATROPINE METHYLBROMIDE ORAL SOLUTION 5; 1.5 MG/5ML; MG/5ML
5 LIQUID ORAL EVERY 6 HOURS PRN
Qty: 120 ML | Refills: 0 | Status: SHIPPED | OUTPATIENT
Start: 2022-12-05

## 2022-12-05 RX ORDER — CIPROFLOXACIN 500 MG/1
500 TABLET, FILM COATED ORAL 2 TIMES DAILY
Qty: 20 TABLET | Refills: 0 | Status: SHIPPED | OUTPATIENT
Start: 2022-12-05 | End: 2023-01-17

## 2022-12-05 NOTE — PROGRESS NOTES
"Chief Complaint  Chief Complaint   Patient presents with   • Cough     Pt c/o cough, congestion, nausea x 1.5 week     Subjective    History of Present Illness        Mendy Woods presents to [facility] for   History of Present Illness  The patient is a 72-year-old-female who present the office for a cough.    The patient states that her  came back from [place] and had a cold which she caught. She states that the first couple of days she stayed in bed. She reports that the coughing was terrible, however; she denies having as fever. The patient reports that she is still coughing. She states that her issue is that she is no longer a type 2 diabetic, she is trying to keep her blood sugar levels under control. The patient reports that she has been taking DayQuil for high blood pressure, it doesn't have any sugar. However, it is making her nauseous. She states that she is trying to keep her sugar under control and she needs to eat. She notes that the last thing she needs is upset stomach.      Objective   Vital Signs:   Visit Vitals  /70   Pulse 67   Temp 97.1 °F (36.2 °C)   Resp 18   Ht 155.6 cm (61.26\")   Wt 102 kg (224 lb)   SpO2 98%   BMI 41.97 kg/m²        Physical Exam  Vitals reviewed.   Constitutional:       Appearance: She is well-developed.   HENT:      Head: Normocephalic.      Right Ear: External ear normal.      Left Ear: External ear normal.      Nose: Nose normal.   Eyes:      Conjunctiva/sclera: Conjunctivae normal.   Cardiovascular:      Rate and Rhythm: Normal rate and regular rhythm.   Pulmonary:      Effort: Pulmonary effort is normal.      Breath sounds: Normal breath sounds.   Musculoskeletal:         General: Normal range of motion.      Cervical back: Normal range of motion and neck supple.   Skin:     General: Skin is warm and dry.      Capillary Refill: Capillary refill takes less than 2 seconds.   Neurological:      Mental Status: She is alert " and oriented to person, place, and time.            Result Review :                      Assessment and Plan      Diagnoses and all orders for this visit:    1. Acute cough (Primary)  Assessment & Plan:  she was prescribed Cipro, Tessalon Perles and Hycodan to treat her symptoms.    Increase fluids. Tylenol/motrin for pain or fever.   Medication and medication adverse effects discussed.    Follow-up 5-7 days for reevaluation if not improved or sooner if needed.      Orders:  -     ciprofloxacin (CIPRO) 500 MG tablet; Take 1 tablet by mouth 2 (Two) Times a Day.  Dispense: 20 tablet; Refill: 0  -     benzonatate (TESSALON) 200 MG capsule; Take 1 capsule by mouth 3 (Three) Times a Day As Needed for Cough.  Dispense: 30 capsule; Refill: 1  -     HYDROcodone Bit-Homatrop MBr (HYCODAN) 5-1.5 MG/5ML solution; Take 5 mL by mouth Every 6 (Six) Hours As Needed for Cough.  Dispense: 120 mL; Refill: 0           Follow Up   No follow-ups on file.  Patient was given instructions and counseling regarding her condition or for health maintenance advice. Please see specific information pulled into the AVS if appropriate.     Transcribed from ambient dictation for Johnny Mercedes Sr, MD by Feli Epps.  12/05/22   12:09 EST    Patient or patient representative verbalized consent to the visit recording.  I have personally performed the services described in this document as transcribed by the above individual, and it is both accurate and complete.

## 2022-12-05 NOTE — ASSESSMENT & PLAN NOTE
she was prescribed Cipro, Tessalon Perles and Hycodan to treat her symptoms.    Increase fluids. Tylenol/motrin for pain or fever.   Medication and medication adverse effects discussed.    Follow-up 5-7 days for reevaluation if not improved or sooner if needed.

## 2022-12-07 ENCOUNTER — TELEPHONE (OUTPATIENT)
Dept: FAMILY MEDICINE CLINIC | Facility: CLINIC | Age: 72
End: 2022-12-07

## 2022-12-07 NOTE — TELEPHONE ENCOUNTER
It has only been two days, recommend to continue her current treatment, take medication with food to prevent upset stomach

## 2022-12-07 NOTE — TELEPHONE ENCOUNTER
Caller: Mendy Woods    Relationship: Self    Best call back number: [phone]    What was the call regarding: PATIENT IS CALLING STATING THAT SHE IS STILL HAVING CONTINUED SYMPTOMS SINCE APPOINTMENT WITH DR. JOHNY HERNANDEZ ON 12.05.22.     PATIENT STATED SHE DOES NOT FEEL WORSE BUT DOESN'T FEEL SHE IS GETTING ANY BETTER.     PATIENT ALSO STATED THAT SHE IF FEELING NAUSEOUS AND WHERE SHE IS A TYPE 1 DIABETIC SHE DOES HAVE TO EAT WHEN TAKING THE MEDICATION FOR HER TYPE 1 DIABETES BUT THE NAUSEA IS MAKING IT HARD.     PATIENT WOULD LIKE TO KNOW IF SHE NEEDS TO STAY ON THE MEDICATION THAT WAS PRESCRIBED OR DOES SHE NEED TO TRY ANOTHER MEDICATION TO HELP.     PLEASE CALL TO DISCUSS AND ADVISE AS SOON AS POSSIBLE.

## 2022-12-09 ENCOUNTER — TELEPHONE (OUTPATIENT)
Dept: FAMILY MEDICINE CLINIC | Facility: CLINIC | Age: 72
End: 2022-12-09

## 2022-12-09 RX ORDER — AZITHROMYCIN 250 MG/1
TABLET, FILM COATED ORAL
Qty: 6 TABLET | Refills: 0 | Status: SHIPPED | OUTPATIENT
Start: 2022-12-09 | End: 2023-01-17

## 2022-12-09 NOTE — TELEPHONE ENCOUNTER
PATIENT IS REQUESTING TO SPEAK WITH BRIANNA CANTU OR HER ASSISTANT    Caller: Mendy Woods    Relationship: Self    Best call back number: [phone]    What medications are you currently taking:   Current Outpatient Medications on File Prior to Visit   Medication Sig Dispense Refill   • aspirin 81 MG EC tablet Take  by mouth.     • atorvastatin (LIPITOR) 80 MG tablet TAKE 1 TABLET BY MOUTH EVERY DAY 90 tablet 3   • benzonatate (TESSALON) 200 MG capsule Take 1 capsule by mouth 3 (Three) Times a Day As Needed for Cough. 30 capsule 1   • carvedilol (COREG) 25 MG tablet Take 1 tablet by mouth 2 (Two) Times a Day With Meals. 180 tablet 3   • cephalexin (Keflex) 500 MG capsule Take 1 capsule by mouth 2 (Two) Times a Day. 14 capsule 0   • ciprofloxacin (CIPRO) 500 MG tablet Take 1 tablet by mouth 2 (Two) Times a Day. 20 tablet 0   • cloNIDine (CATAPRES) 0.1 MG tablet Take 1 tablet by mouth 2 (Two) Times a Day. 180 tablet 3   • Cobalamin Combinations (B-12) 100-5000 MCG sublingual tablet Place  under the tongue.     • Continuous Blood Gluc  (FreeStyle Hien 14 Day [place]) device 1 KIT EVERY 3 (THREE) MONTHS. 1 each 1   • Continuous Blood Gluc  (FreeStyle Hien 2 [place]) device 1 each Take As Directed. 1 each 0   • Continuous Blood Gluc Sensor (FreeStyle Hien 2 Sensor) misc 1 application Every 14 (Fourteen) Days. 6 each 3   • Diclofenac Sodium (VOLTAREN) 1 % gel gel Apply 4 g topically to the appropriate area as directed 4 (Four) Times a Day As Needed.     • ferrous sulfate 324 (65 Fe) MG tablet delayed-release EC tablet Take 324 mg by mouth 2 (Two) Times a Day With Meals.     • FREESTYLE LITE test strip USE 3 TIMES A DAY AS DIRECTED  3   • furosemide (LASIX) 40 MG tablet Take 0.5 tablets by mouth Daily. (Patient taking differently: Take 20 mg by mouth As Needed. 03/30/22-Every other day) 30 tablet 11   • HYDROcodone Bit-Homatrop MBr (HYCODAN) 5-1.5 MG/5ML solution Take 5 mL by mouth Every 6  (Six) Hours As Needed for Cough. 120 mL 0   • Insulin Glargine (Lantus SoloStar) 100 UNIT/ML injection pen Inject 38 Units under the skin into the appropriate area as directed Daily. INJECT 45 UNITS DAILY 15 pen 3   • Insulin Lispro, 1 Unit Dial, (HumaLOG KwikPen) 100 UNIT/ML solution pen-injector 7 units 3 times a day with meals. 15 pen 1   • Insulin Pen Needle (BD Pen Needle Neli U/F) 32G X 4 MM misc 1 Device 2 (two) times a day. 200 each 1   • loratadine (CLARITIN) 10 MG tablet Take 1 tablet by mouth Daily for 30 days. 30 tablet 0   • losartan (COZAAR) 25 MG tablet Take 1 tablet by mouth Daily. (Patient taking differently: Take 25 mg by mouth Daily. Taking twice a day) 180 tablet 3   • montelukast (SINGULAIR) 10 MG tablet Take 1 tablet by mouth Every Night for 30 days. 30 tablet 0   • Synthroid 137 MCG tablet TAKE 1 TABLET BY MOUTH EVERY DAY 90 tablet 0   • Vitamin D, Cholecalciferol, (CHOLECALCIFEROL) 10 MCG (400 UNIT) tablet Take 400 Units by mouth Daily.     • vitamin E 100 UNIT capsule Take 100 Units by mouth Daily.     • [DISCONTINUED] levocetirizine (XYZAL) 5 MG tablet Take 5 mg by mouth As Needed.       No current facility-administered medications on file prior to visit.        Which medication are you concerned about: ciprofloxacin (CIPRO) 500 MG tablet    Who prescribed you this medication: DR. HERNANDEZ    What are your concerns: PATIENT STATES SHE HAS BEEN EXPERIENCING AN UPSET STOMACH WITH THIS MEDICATION. SHE HAD READ THAT SHE NEEDED TO BE CAREFUL TAKING THIS MEDICATION WITH THE Synthroid 137 MCG tablet. SHE IS HAVING TROUBLE EATING, BUT IS NEEDING TO EAT DUE TO BEING A TYPE 2 DIABETIC. PATIENT HAS ALSO HAD THE CONTINUED PERSISTENT COUGH FOR ABOUT 2 WEEKS, AS WELL AS FEELING NAUSEOUS.     PATIENT STATES SHE HAS 2 TABLET'S LEFT. SHE IS WANTING TO KNOW IF SHE NEEDS TO CONTINUE TO TAKE THIS MEDICATION AND RIDE IT OUT OR IF THERES AN ALTERNATIVE THAT SHE IS NEEDING TO DO.    PATIENT STATES SHE IS NOT FEELING  WORSE, BUT IS NOT FEELING ANY BETTER.

## 2022-12-27 RX ORDER — LEVOTHYROXINE SODIUM 137 MCG
TABLET ORAL
Qty: 90 TABLET | Refills: 0 | Status: SHIPPED | OUTPATIENT
Start: 2022-12-27 | End: 2023-03-28

## 2022-12-27 RX ORDER — CLONIDINE HYDROCHLORIDE 0.1 MG/1
TABLET ORAL
Qty: 180 TABLET | Refills: 2 | Status: SHIPPED | OUTPATIENT
Start: 2022-12-27

## 2023-01-16 RX ORDER — FUROSEMIDE 40 MG/1
20 TABLET ORAL AS NEEDED
Qty: 90 TABLET | Refills: 3 | Status: SHIPPED | OUTPATIENT
Start: 2023-01-16

## 2023-01-16 NOTE — TELEPHONE ENCOUNTER
Rx Refill Note  Requested Prescriptions     Pending Prescriptions Disp Refills   • Lantus SoloStar 100 UNIT/ML injection pen [Pharmacy Med Name: LANTUS SOLOSTAR 100 UNIT/ML]  1     Sig: INJECT 45 UNITS SUBCUTANEOUSLY DAILY      Last office visit with prescribing clinician: 8/18/2022   Last telemedicine visit with prescribing clinician: 1/17/2023   Next office visit with prescribing clinician: 1/17/2023                         Would you like a call back once the refill request has been completed: [] Yes [] No    If the office needs to give you a call back, can they leave a voicemail: [] Yes [] No    Trish Mary LPN  01/16/23, 07:05 EST

## 2023-01-17 ENCOUNTER — OFFICE VISIT (OUTPATIENT)
Dept: FAMILY MEDICINE CLINIC | Facility: CLINIC | Age: 73
End: 2023-01-17
Payer: MEDICARE

## 2023-01-17 VITALS
SYSTOLIC BLOOD PRESSURE: 124 MMHG | OXYGEN SATURATION: 98 % | HEART RATE: 74 BPM | DIASTOLIC BLOOD PRESSURE: 78 MMHG | HEIGHT: 61 IN | RESPIRATION RATE: 18 BRPM | TEMPERATURE: 96.6 F | BODY MASS INDEX: 43.69 KG/M2 | WEIGHT: 231.4 LBS

## 2023-01-17 DIAGNOSIS — N36.8 MASS OF URETHRA: ICD-10-CM

## 2023-01-17 DIAGNOSIS — Z79.4 TYPE 2 DIABETES MELLITUS WITH HYPERGLYCEMIA, WITH LONG-TERM CURRENT USE OF INSULIN: Primary | ICD-10-CM

## 2023-01-17 DIAGNOSIS — E11.65 TYPE 2 DIABETES MELLITUS WITH HYPERGLYCEMIA, WITH LONG-TERM CURRENT USE OF INSULIN: Primary | ICD-10-CM

## 2023-01-17 PROCEDURE — 99213 OFFICE O/P EST LOW 20 MIN: CPT | Performed by: NURSE PRACTITIONER

## 2023-01-17 RX ORDER — AMOXICILLIN 250 MG
CAPSULE ORAL
COMMUNITY

## 2023-01-17 RX ORDER — INSULIN GLARGINE 100 [IU]/ML
INJECTION, SOLUTION SUBCUTANEOUS
Qty: 45 ML | Refills: 1 | Status: SHIPPED | OUTPATIENT
Start: 2023-01-17

## 2023-01-17 NOTE — PROGRESS NOTES
"Chief Complaint  Mass (Vagina area, Caruncle) and Hypoglycemia (Last one month had low bloods sugar readings Alarms with the sebas)    Subjective        Mendy Woods presents to [facility]  History of Present Illness    Mendy Grady is a 72-year-old female who presents today for a vaginal mass as well as hypoglycemia.     The patient states that 2 or 3 months ago she could noticed something while wiping after urinating. She states when she got sick on 12/2022 she started coughing and noticed that she had a \"little finger sticking out\" of her vaginal area. She states she did research on the lesion and what she read said it is usually benign. She denies vaginal bleeding, but states that she if she wears an overnight pad, she will experience irritation to her vaginal area. She states that she also read on while researching that she estrogen cream can be used to treat the area.     She states that has been experiencing issues keeping her blood glucose elevated. She states on her glucose monitor is reading that her average blood glucose is 6.2. She states this morning she had a slice of bread, an egg white omelette and by 10 AM, her blood glucose was 80 again. She made herself a salad for lunch with shrimp in it and her blood glucose has gone up to 120 and by the time she got back the office it had gone back to down to 80 so she drank a soda. She states she is taking 38 units of long acting insulin and 7 units of the fast acting insulin. She states that she got an alarm at 4:20 AM for her sugar being too low and had to eat some sugar.     She states that she has been gaining weight again and thinks it is water weight. She is currently taking furosemide. She states she feels as if she is up every hour going to the restroom for the past 3 days. She states she feels as if she still has fluid on her lower extremities. She states she has been watching her sodium intake, but is still " "eating sodium. She has been using egg whites to help her kidneys. She states she has been changing her diet to try to improve her kidney function.     The patient states her blood pressure has been stable since taking losartan 2 times daily. She states when she forgot to take her medication it was 171/80 mmHg.     She states she was sick after taking Cipro and it took her a long time to get over her sickness.     The patient states her big toe has a fungus and she has lost a chunk of her toenail.     She states she has been trying to exercise as she can tolerate by doing stretching and band exercising. Her daughter will be moving in and moving her treadmill.     She states she does at home breast exams at home.     Objective   Vital Signs:  /78 (BP Location: Right arm, Patient Position: Sitting, Cuff Size: Adult)   Pulse 74   Temp 96.6 °F (35.9 °C) (Temporal)   Resp 18   Ht 155.6 cm (61.26\")   Wt 105 kg (231 lb 6.4 oz)   SpO2 98%   BMI 43.35 kg/m²   Estimated body mass index is 43.35 kg/m² as calculated from the following:    Height as of this encounter: 155.6 cm (61.26\").    Weight as of this encounter: 105 kg (231 lb 6.4 oz).          Physical Exam  Constitutional:       General: She is not in acute distress.     Appearance: She is well-developed. She is not diaphoretic.   Cardiovascular:      Rate and Rhythm: Normal rate and regular rhythm.      Heart sounds: Normal heart sounds. No murmur heard.    No friction rub. No gallop.   Pulmonary:      Effort: Pulmonary effort is normal. No respiratory distress.      Breath sounds: Normal breath sounds. No wheezing or rales.   Abdominal:      General: Bowel sounds are normal. There is no distension.      Palpations: Abdomen is soft.      Tenderness: There is no abdominal tenderness.   Musculoskeletal:      Cervical back: Neck supple.   Skin:     General: Skin is warm and dry.   Neurological:      Mental Status: She is alert and oriented to person, place, and " time.        Result Review :                Assessment and Plan   Diagnoses and all orders for this visit:    1. Type 2 diabetes mellitus with hyperglycemia, with long-term current use of insulin (HCC) (Primary)  -     Hemoglobin A1c  -     Renal Function Panel    2. Mass of urethra    Other orders  -     Estrogens Conjugated (PREMARIN) 0.625 MG/GM vaginal cream; Apply small amount to affected area daily x 2 weeks, then twice weekly  Dispense: 30 g; Refill: 0      1. Vaginal mass  - Prescribed estrogen cream, she will follow up with urology if there is no improvement.     2. Diabetes   - Discussed her insulin. Suspect her hemoglobin A1c is well controlled. Discussed that her hemoglobin A1c should be slightly higher to prevent hypoglycemic episodes. Would consider decreasing her insulin to 35 units a day pending labs that will come back tomorrow.        Follow Up   No follow-ups on file.  Patient was given instructions and counseling regarding her condition or for health maintenance advice. Please see specific information pulled into the AVS if appropriate.       Transcribed from ambient dictation for BRIANNA Baires by Laxmi Brenner.  01/17/23   16:57 EST    Patient or patient representative verbalized consent to the visit recording.  I have personally performed the services described in this document as transcribed by the above individual, and it is both accurate and complete.  Answers for HPI/ROS submitted by the patient on 1/16/2023  What is the primary reason for your visit?: Vaginal Discharge/Irritation

## 2023-01-18 LAB
ALBUMIN SERPL-MCNC: 4.2 G/DL (ref 3.5–5.2)
BUN SERPL-MCNC: 44 MG/DL (ref 8–23)
BUN/CREAT SERPL: 20.1 (ref 7–25)
CALCIUM SERPL-MCNC: 9.5 MG/DL (ref 8.6–10.5)
CHLORIDE SERPL-SCNC: 103 MMOL/L (ref 98–107)
CO2 SERPL-SCNC: 26.4 MMOL/L (ref 22–29)
CREAT SERPL-MCNC: 2.19 MG/DL (ref 0.57–1)
EGFRCR SERPLBLD CKD-EPI 2021: 23.3 ML/MIN/1.73
GLUCOSE SERPL-MCNC: 136 MG/DL (ref 65–99)
HBA1C MFR BLD: 6.7 % (ref 4.8–5.6)
PHOSPHATE SERPL-MCNC: 3.9 MG/DL (ref 2.5–4.5)
POTASSIUM SERPL-SCNC: 4.9 MMOL/L (ref 3.5–5.2)
SODIUM SERPL-SCNC: 140 MMOL/L (ref 136–145)

## 2023-01-19 ENCOUNTER — TELEPHONE (OUTPATIENT)
Dept: FAMILY MEDICINE CLINIC | Facility: CLINIC | Age: 73
End: 2023-01-19
Payer: MEDICARE

## 2023-01-19 NOTE — TELEPHONE ENCOUNTER
Spoke to pt and let her know A1C is at 6.7, ok to reduce insulin to 35 units as discussed  Kidney function is stable, glucose 136 in office. Pt understood       ---- Message from BRIANNA Vargas sent at 1/19/2023  7:44 AM EST -----  A1C is at 6.7, ok to reduce insulin to 35 units as discussed  Kidney function is stable, glucose 136 in office

## 2023-01-23 ENCOUNTER — LAB (OUTPATIENT)
Dept: LAB | Facility: HOSPITAL | Age: 73
End: 2023-01-23
Payer: MEDICARE

## 2023-01-23 ENCOUNTER — TRANSCRIBE ORDERS (OUTPATIENT)
Dept: ADMINISTRATIVE | Facility: HOSPITAL | Age: 73
End: 2023-01-23
Payer: MEDICARE

## 2023-01-23 ENCOUNTER — OFFICE VISIT (OUTPATIENT)
Dept: CARDIOLOGY | Facility: CLINIC | Age: 73
End: 2023-01-23
Payer: MEDICARE

## 2023-01-23 VITALS
HEIGHT: 61 IN | HEART RATE: 72 BPM | OXYGEN SATURATION: 98 % | BODY MASS INDEX: 43.61 KG/M2 | SYSTOLIC BLOOD PRESSURE: 125 MMHG | DIASTOLIC BLOOD PRESSURE: 68 MMHG | WEIGHT: 231 LBS

## 2023-01-23 DIAGNOSIS — N18.4 CHRONIC KIDNEY DISEASE, STAGE IV (SEVERE): ICD-10-CM

## 2023-01-23 DIAGNOSIS — E55.9 AVITAMINOSIS D: ICD-10-CM

## 2023-01-23 DIAGNOSIS — I12.9 BENIGN HYPERTENSION WITH CKD (CHRONIC KIDNEY DISEASE) STAGE III: Primary | ICD-10-CM

## 2023-01-23 DIAGNOSIS — E78.5 HYPERLIPIDEMIA, UNSPECIFIED HYPERLIPIDEMIA TYPE: ICD-10-CM

## 2023-01-23 DIAGNOSIS — N18.30 BENIGN HYPERTENSION WITH CKD (CHRONIC KIDNEY DISEASE) STAGE III: Primary | ICD-10-CM

## 2023-01-23 DIAGNOSIS — N18.4 CHRONIC KIDNEY DISEASE, STAGE IV (SEVERE): Primary | ICD-10-CM

## 2023-01-23 DIAGNOSIS — I10 ESSENTIAL HYPERTENSION: ICD-10-CM

## 2023-01-23 DIAGNOSIS — E10.21 TYPE 1 DIABETES MELLITUS WITH NEPHROPATHY: ICD-10-CM

## 2023-01-23 LAB
25(OH)D3 SERPL-MCNC: 37.8 NG/ML (ref 30–100)
ANION GAP SERPL CALCULATED.3IONS-SCNC: 8.1 MMOL/L (ref 5–15)
BACTERIA UR QL AUTO: ABNORMAL /HPF
BILIRUB UR QL STRIP: NEGATIVE
BUN SERPL-MCNC: 52 MG/DL (ref 8–23)
BUN/CREAT SERPL: 19.3 (ref 7–25)
CALCIUM SPEC-SCNC: 9.6 MG/DL (ref 8.6–10.5)
CALCIUM SPEC-SCNC: 9.8 MG/DL (ref 8.6–10.5)
CHLORIDE SERPL-SCNC: 105 MMOL/L (ref 98–107)
CLARITY UR: CLEAR
CO2 SERPL-SCNC: 26.9 MMOL/L (ref 22–29)
COLOR UR: YELLOW
CREAT SERPL-MCNC: 2.69 MG/DL (ref 0.57–1)
CREAT UR-MCNC: 52 MG/DL
DEPRECATED RDW RBC AUTO: 43.4 FL (ref 37–54)
EGFRCR SERPLBLD CKD-EPI 2021: 18.2 ML/MIN/1.73
ERYTHROCYTE [DISTWIDTH] IN BLOOD BY AUTOMATED COUNT: 13.1 % (ref 12.3–15.4)
GLUCOSE SERPL-MCNC: 94 MG/DL (ref 65–99)
GLUCOSE UR STRIP-MCNC: NEGATIVE MG/DL
HCT VFR BLD AUTO: 36 % (ref 34–46.6)
HGB BLD-MCNC: 11.4 G/DL (ref 12–15.9)
HGB UR QL STRIP.AUTO: NEGATIVE
HYALINE CASTS UR QL AUTO: ABNORMAL /LPF
KETONES UR QL STRIP: NEGATIVE
LEUKOCYTE ESTERASE UR QL STRIP.AUTO: ABNORMAL
MAGNESIUM SERPL-MCNC: 2 MG/DL (ref 1.6–2.4)
MCH RBC QN AUTO: 28.7 PG (ref 26.6–33)
MCHC RBC AUTO-ENTMCNC: 31.7 G/DL (ref 31.5–35.7)
MCV RBC AUTO: 90.7 FL (ref 79–97)
NITRITE UR QL STRIP: NEGATIVE
PH UR STRIP.AUTO: <=5 [PH] (ref 5–8)
PHOSPHATE SERPL-MCNC: 4.9 MG/DL (ref 2.5–4.5)
PLATELET # BLD AUTO: 214 10*3/MM3 (ref 140–450)
PMV BLD AUTO: 9 FL (ref 6–12)
POTASSIUM SERPL-SCNC: 4.9 MMOL/L (ref 3.5–5.2)
PROT SERPL-MCNC: 8 G/DL (ref 6–8.5)
PROT UR QL STRIP: NEGATIVE
PTH-INTACT SERPL-MCNC: 166 PG/ML (ref 15–65)
RBC # BLD AUTO: 3.97 10*6/MM3 (ref 3.77–5.28)
RBC # UR STRIP: ABNORMAL /HPF
REF LAB TEST METHOD: ABNORMAL
SODIUM SERPL-SCNC: 140 MMOL/L (ref 136–145)
SP GR UR STRIP: 1.01 (ref 1–1.03)
SQUAMOUS #/AREA URNS HPF: ABNORMAL /HPF
URATE SERPL-MCNC: 11.9 MG/DL (ref 2.4–5.7)
UROBILINOGEN UR QL STRIP: ABNORMAL
WBC # UR STRIP: ABNORMAL /HPF
WBC NRBC COR # BLD: 8.74 10*3/MM3 (ref 3.4–10.8)

## 2023-01-23 PROCEDURE — 83970 ASSAY OF PARATHORMONE: CPT

## 2023-01-23 PROCEDURE — 99214 OFFICE O/P EST MOD 30 MIN: CPT | Performed by: NURSE PRACTITIONER

## 2023-01-23 PROCEDURE — 84550 ASSAY OF BLOOD/URIC ACID: CPT

## 2023-01-23 PROCEDURE — 82570 ASSAY OF URINE CREATININE: CPT

## 2023-01-23 PROCEDURE — 80048 BASIC METABOLIC PNL TOTAL CA: CPT

## 2023-01-23 PROCEDURE — 36415 COLL VENOUS BLD VENIPUNCTURE: CPT

## 2023-01-23 PROCEDURE — 83735 ASSAY OF MAGNESIUM: CPT

## 2023-01-23 PROCEDURE — 85027 COMPLETE CBC AUTOMATED: CPT

## 2023-01-23 PROCEDURE — 84156 ASSAY OF PROTEIN URINE: CPT

## 2023-01-23 PROCEDURE — 93000 ELECTROCARDIOGRAM COMPLETE: CPT | Performed by: NURSE PRACTITIONER

## 2023-01-23 PROCEDURE — 84155 ASSAY OF PROTEIN SERUM: CPT

## 2023-01-23 PROCEDURE — 84100 ASSAY OF PHOSPHORUS: CPT

## 2023-01-23 PROCEDURE — 82306 VITAMIN D 25 HYDROXY: CPT

## 2023-01-23 PROCEDURE — 81001 URINALYSIS AUTO W/SCOPE: CPT

## 2023-01-23 RX ORDER — LOSARTAN POTASSIUM 25 MG/1
75 TABLET ORAL
COMMUNITY
Start: 2022-10-02 | End: 2023-10-02

## 2023-01-23 NOTE — PROGRESS NOTES
Date of Office Visit: 2023  Encounter Provider: BRIANNA Ramirez  Place of Service: [facility] C[facility]  Patient Name: Mendy Woods  :1950    Chief Complaint   Patient presents with   • Follow-up   • Hypertension   :     HPI: Mendy Woosd is a 72 y.o. female who is a patient of  Dr. Fuchs and is new to me today.  Has a history of hypertension, hyperlipidemia, obesity, type 2 diabetes on insulin therapy and chronic kidney disease stage III AA.  She was last in the office in July and the blood pressure was doing well.  Occasionally she took an extra low-dose losartan.    Her blood pressure has been well controlled from a cardiac standpoint.  Her blood sugars are stable.  She has had some trouble with weight loss.  She used to weigh 50 pounds lighter when she was doing running in a swimming pool but she does not have that here now.  She is getting a treadmill and plans to exercise more.  Previous testing and notes have been reviewed by me.   Past Medical History:   Diagnosis Date   • Abnormal blood chemistry test    • Allergic rhinitis    • Anemia, B12 deficiency    • Arthritis    • Cutaneous lupus erythematosus    • Dermatitis    • Disorder of kidney and ureter    • Edema of optic nerve    • Herpes zoster    • Hyperlipidemia    • Hypertension    • Laryngitis    • Low back pain    • Pernicious anemia    • Renal failure    • Sarcoidosis    • Skin rash    • Tonsillitis    • Type 1 diabetes mellitus (HCC)    • Type 2 diabetes mellitus, controlled (HCC)    • Vitamin B deficiency        Past Surgical History:   Procedure Laterality Date   • COLONOSCOPY      Normal.   • LYMPH NODE BIOPSY      sarcoid       Social History     Socioeconomic History   • Marital status:    Tobacco Use   • Smoking status: Never   • Smokeless tobacco: Never   • Tobacco comments:     occas caffeine use    Vaping Use   • Vaping Use: Never used   Substance and Sexual  Activity   • Alcohol use: Yes     Comment: rarely   • Drug use: No   • Sexual activity: Yes     Partners: Male     Comment:        Family History   Problem Relation Age of Onset   • Thyroid disease Mother    • Diabetes Father    • Heart attack Father    • Hashimoto's thyroiditis Sister    • Hashimoto's thyroiditis Daughter    • Diabetes Paternal Grandmother    • Hashimoto's thyroiditis Sister    • Hashimoto's thyroiditis Daughter    • Cancer Paternal Grandfather        Review of Systems   Constitutional: Negative for diaphoresis and malaise/fatigue.   Cardiovascular: Positive for leg swelling. Negative for chest pain, claudication, dyspnea on exertion, irregular heartbeat, near-syncope, orthopnea, palpitations, paroxysmal nocturnal dyspnea and syncope.   Respiratory: Negative for cough, shortness of breath and sleep disturbances due to breathing.    Musculoskeletal: Negative for falls.   Neurological: Negative for dizziness and weakness.   Psychiatric/Behavioral: Negative for altered mental status and substance abuse.       Allergies   Allergen Reactions   • Sulfa Antibiotics Other (See Comments)     Facial swelling   • Sulfamethazine Swelling         Current Outpatient Medications:   •  aspirin 81 MG EC tablet, Take  by mouth., Disp: , Rfl:   •  atorvastatin (LIPITOR) 80 MG tablet, TAKE 1 TABLET BY MOUTH EVERY DAY, Disp: 90 tablet, Rfl: 3  •  carvedilol (COREG) 25 MG tablet, Take 1 tablet by mouth 2 (Two) Times a Day With Meals., Disp: 180 tablet, Rfl: 3  •  cloNIDine (CATAPRES) 0.1 MG tablet, TAKE 1 TABLET BY MOUTH TWICE A DAY, Disp: 180 tablet, Rfl: 2  •  Cobalamin Combinations (B-12) 100-5000 MCG sublingual tablet, Place  under the tongue., Disp: , Rfl:   •  Cobalamin Combinations (B-12) 100-5000 MCG sublingual tablet, Place  under the tongue., Disp: , Rfl:   •  Continuous Blood Gluc  (FreeStyle Hien 14 Day [place]) device, 1 KIT EVERY 3 (THREE) MONTHS., Disp: 1 each, Rfl: 1  •  Continuous Blood  Gluc  (FreeStyle Hien 2 [place]) device, 1 each Take As Directed., Disp: 1 each, Rfl: 0  •  Continuous Blood Gluc Sensor (FreeStyle Hien 2 Sensor) misc, 1 application Every 14 (Fourteen) Days., Disp: 6 each, Rfl: 3  •  Diclofenac Sodium (VOLTAREN) 1 % gel gel, Apply 4 g topically to the appropriate area as directed 4 (Four) Times a Day As Needed., Disp: , Rfl:   •  Estrogens Conjugated (PREMARIN) 0.625 MG/GM vaginal cream, Apply small amount to affected area daily x 2 weeks, then twice weekly, Disp: 30 g, Rfl: 0  •  ferrous sulfate 324 (65 Fe) MG tablet delayed-release EC tablet, Take 324 mg by mouth 2 (Two) Times a Day With Meals., Disp: , Rfl:   •  FREESTYLE LITE test strip, USE 3 TIMES A DAY AS DIRECTED, Disp: , Rfl: 3  •  furosemide (LASIX) 40 MG tablet, Take 0.5 tablets by mouth As Needed (swelling). 03/30/22-Every other day, Disp: 90 tablet, Rfl: 3  •  Insulin Lispro, 1 Unit Dial, (HumaLOG KwikPen) 100 UNIT/ML solution pen-injector, 7 units 3 times a day with meals., Disp: 15 pen, Rfl: 1  •  Insulin Pen Needle (BD Pen Needle Neli U/F) 32G X 4 MM misc, 1 Device 2 (two) times a day., Disp: 200 each, Rfl: 1  •  Lantus SoloStar 100 UNIT/ML injection pen, INJECT 45 UNITS SUBCUTANEOUSLY DAILY, Disp: 45 mL, Rfl: 1  •  losartan (COZAAR) 25 MG tablet, Take 1 tablet by mouth Daily. (Patient taking differently: Take 25 mg by mouth Daily. Taking twice a day), Disp: 180 tablet, Rfl: 3  •  losartan (COZAAR) 25 MG tablet, Take 75 mg by mouth., Disp: , Rfl:   •  Synthroid 137 MCG tablet, TAKE 1 TABLET BY MOUTH EVERY DAY, Disp: 90 tablet, Rfl: 0  •  Vitamin D, Cholecalciferol, (CHOLECALCIFEROL) 10 MCG (400 UNIT) tablet, Take 400 Units by mouth Daily., Disp: , Rfl:   •  vitamin E 100 UNIT capsule, Take 100 Units by mouth Daily., Disp: , Rfl:   •  HYDROcodone Bit-Homatrop MBr (HYCODAN) 5-1.5 MG/5ML solution, Take 5 mL by mouth Every 6 (Six) Hours As Needed for Cough., Disp: 120 mL, Rfl: 0  •  loratadine (CLARITIN) 10 MG  "tablet, Take 1 tablet by mouth Daily for 30 days., Disp: 30 tablet, Rfl: 0  •  montelukast (SINGULAIR) 10 MG tablet, Take 1 tablet by mouth Every Night for 30 days., Disp: 30 tablet, Rfl: 0      Objective:     Vitals:    01/23/23 1214   BP: 125/68   Pulse: 72   SpO2: 98%   Weight: 105 kg (231 lb)   Height: 154.9 cm (61\")     Body mass index is 43.65 kg/m².    PHYSICAL EXAM:    Constitutional:       General: Not in acute distress.     Appearance: Normal appearance. Well-developed.   Eyes:      Pupils: Pupils are equal, round, and reactive to light.   HENT:      Head: Normocephalic.   Neck:      Vascular: No carotid bruit or JVD.   Pulmonary:      Effort: Pulmonary effort is normal. No tachypnea.      Breath sounds: Normal breath sounds. No wheezing. No rales.   Cardiovascular:      Normal rate. Regular rhythm.      No gallop.   Pulses:     Intact distal pulses.   Edema:     Pretibial: bilateral trace edema of the pretibial area.  Abdominal:      General: Bowel sounds are normal.      Palpations: Abdomen is soft.      Tenderness: There is no abdominal tenderness.   Musculoskeletal: Normal range of motion.      Cervical back: Normal range of motion and neck supple. No edema. Skin:     General: Skin is warm and dry.   Neurological:      Mental Status: Alert and oriented to person, place, and time.           ECG 12 Lead    Date/Time: 1/23/2023 1:18 PM  Performed by: Radha Gomez APRN  Authorized by: Radha Gomez APRN   Comparison: compared with previous ECG from 7/6/2022  Similar to previous ECG  Rhythm: sinus rhythm  Rate: normal  QRS axis: normal    Clinical impression: normal ECG              Assessment:       Diagnosis Plan   1. Benign hypertension with CKD (chronic kidney disease) stage III (HCC)        2. Essential hypertension        3. Hyperlipidemia, unspecified hyperlipidemia type        4. Type 1 diabetes mellitus with nephropathy (HCC)          No orders of the defined types were placed in this " encounter.         Plan:       We talked about diet and lifestyle modifications.  I think physical activity will help jumpstart her weight loss.  We also talked about some newer diabetes medications that are out that can help with weight loss.  She is going to talk to her primary care about it.  She can follow-up with us in 6 months.         Your medication list          Accurate as of January 23, 2023 12:30 PM. If you have any questions, ask your nurse or doctor.            CHANGE how you take these medications      Instructions Last Dose Given Next Dose Due   losartan 25 MG tablet  Commonly known as: COZAAR  What changed: additional instructions      Take 1 tablet by mouth Daily.       losartan 25 MG tablet  Commonly known as: COZAAR  What changed: Another medication with the same name was changed. Make sure you understand how and when to take each.      Take 75 mg by mouth.          CONTINUE taking these medications      Instructions Last Dose Given Next Dose Due   aspirin 81 MG EC tablet      Take  by mouth.       atorvastatin 80 MG tablet  Commonly known as: LIPITOR      TAKE 1 TABLET BY MOUTH EVERY DAY       B-12 100-5000 MCG sublingual tablet      Place  under the tongue.       B-12 100-5000 MCG sublingual tablet      Place  under the tongue.       BD Pen Needle Neli U/F 32G X 4 MM misc  Generic drug: Insulin Pen Needle      1 Device 2 (two) times a day.       carvedilol 25 MG tablet  Commonly known as: COREG      Take 1 tablet by mouth 2 (Two) Times a Day With Meals.       cloNIDine 0.1 MG tablet  Commonly known as: CATAPRES      TAKE 1 TABLET BY MOUTH TWICE A DAY       Diclofenac Sodium 1 % gel gel  Commonly known as: VOLTAREN      Apply 4 g topically to the appropriate area as directed 4 (Four) Times a Day As Needed.       Estrogens Conjugated 0.625 MG/GM vaginal cream  Commonly known as: PREMARIN      Apply small amount to affected area daily x 2 weeks, then twice weekly       ferrous sulfate 324 (65 Fe)  MG tablet delayed-release EC tablet      Take 324 mg by mouth 2 (Two) Times a Day With Meals.       FreeStyle Hien 14 Day [place] device      1 KIT EVERY 3 (THREE) MONTHS.       FreeStyle Hien 2 [place] device      1 each Take As Directed.       FreeStyle Hien 2 Sensor misc      1 application Every 14 (Fourteen) Days.       FREESTYLE LITE test strip  Generic drug: glucose blood      USE 3 TIMES A DAY AS DIRECTED       furosemide 40 MG tablet  Commonly known as: LASIX      Take 0.5 tablets by mouth As Needed (swelling). 03/30/22-Every other day       HYDROcodone Bit-Homatrop MBr 5-1.5 MG/5ML solution  Commonly known as: HYCODAN      Take 5 mL by mouth Every 6 (Six) Hours As Needed for Cough.       Insulin Lispro (1 Unit Dial) 100 UNIT/ML solution pen-injector  Commonly known as: HumaLOG KwikPen      7 units 3 times a day with meals.       Lantus SoloStar 100 UNIT/ML injection pen  Generic drug: Insulin Glargine      INJECT 45 UNITS SUBCUTANEOUSLY DAILY       loratadine 10 MG tablet  Commonly known as: CLARITIN      Take 1 tablet by mouth Daily for 30 days.       montelukast 10 MG tablet  Commonly known as: SINGULAIR      Take 1 tablet by mouth Every Night for 30 days.       Synthroid 137 MCG tablet  Generic drug: levothyroxine      TAKE 1 TABLET BY MOUTH EVERY DAY       Vitamin D (Cholecalciferol) 10 MCG (400 UNIT) tablet  Commonly known as: CHOLECALCIFEROL      Take 400 Units by mouth Daily.       vitamin E 100 UNIT capsule      Take 100 Units by mouth Daily.                As always, it has been a pleasure to participate in your patient's care.      Sincerely,     Radha REED

## 2023-01-24 LAB
CREAT UR-MCNC: 52.6 MG/DL
PROT ?TM UR-MCNC: 8.6 MG/DL
PROT/CREAT UR: 163.5 MG/G CREA (ref 0–200)

## 2023-02-23 ENCOUNTER — OFFICE VISIT (OUTPATIENT)
Dept: FAMILY MEDICINE CLINIC | Facility: CLINIC | Age: 73
End: 2023-02-23
Payer: MEDICARE

## 2023-02-23 VITALS
TEMPERATURE: 95.9 F | RESPIRATION RATE: 18 BRPM | SYSTOLIC BLOOD PRESSURE: 122 MMHG | BODY MASS INDEX: 43.27 KG/M2 | WEIGHT: 229.2 LBS | DIASTOLIC BLOOD PRESSURE: 80 MMHG | HEART RATE: 62 BPM | OXYGEN SATURATION: 99 % | HEIGHT: 61 IN

## 2023-02-23 DIAGNOSIS — B37.2 CANDIDAL SKIN INFECTION: ICD-10-CM

## 2023-02-23 DIAGNOSIS — E10.21 TYPE 1 DIABETES MELLITUS WITH NEPHROPATHY: Primary | ICD-10-CM

## 2023-02-23 PROCEDURE — 99213 OFFICE O/P EST LOW 20 MIN: CPT | Performed by: NURSE PRACTITIONER

## 2023-02-23 PROCEDURE — 3044F HG A1C LEVEL LT 7.0%: CPT | Performed by: NURSE PRACTITIONER

## 2023-02-23 RX ORDER — NYSTATIN 100000 [USP'U]/G
POWDER TOPICAL 4 TIMES DAILY
Qty: 60 G | Refills: 3 | Status: SHIPPED | OUTPATIENT
Start: 2023-02-23

## 2023-02-23 RX ORDER — FLUCONAZOLE 150 MG/1
150 TABLET ORAL ONCE
Qty: 1 TABLET | Refills: 0 | Status: SHIPPED | OUTPATIENT
Start: 2023-02-23 | End: 2023-02-23

## 2023-02-23 NOTE — PROGRESS NOTES
"Chief Complaint  Diabetes and Rash (Under breast, x 5 weeks)    Subjective        Mendy Woods presents to [facility]  History of Present Illness    The patient presents to the clinic for diabetes and rash.    The patient reports her blood glucose has been \"pretty good\" for the last 3 to 4 days, but she is unable to explain why. The patient reports she cannot explain why she gets lows. She states her blood glucose has been as low as 40 mg/dL, and she will start sweating. She notes she ate lunch around 12:30 PM, and she picked her granddaughter up at 4:30 PM. The patient reports she took a nap, and she woke up with cold sweats. She states her blood glucose was extremely high this morning. The patient notes she had a small potato, a small piece of chicken, and a celery root salad. The patient notes she went to bed relatively low, and by this morning it was 190 mg/dL. She states she took a fast-acting injection this morning. The patient notes she tries to wait until it gets down to 120 mg/dL to 130 mg/dL. She states she is not sure if it is emotional because of things with her mother going on. The patient notes she has heard of Kernia, but they keep saying that it is for type 2 diabetics only. She states she is not taking the quick acting before bed. She states it is not consistent. The patient notes she is pretty consistent with what she is eating.     She is still experiencing a rash under her breast. She was using antifungal foot powder to try to control her rash.     The patient reports seeing cardiologist and nephrologist on 01/2023. The patient notes nephrology is telling her they think within 2 years, she will need to go on dialysis. She states her kidneys have shown some improvement. The patient notes she is trying to eat at least what she can find online as healthy for her kidneys. She has been eating cabbage, broccoli, cauliflower, Socrates, and cranberries.     The patient " "reports her skin is dry. She states she uses Aveeno eczema cream. She notes her feet get extremely dry and will flake. The patient states she has been using Aveeno eczema cream on it and it seems to be doing pretty well.    She states her toenails keeps having trouble with fungus. The patient states she has not made any arrangements for a podiatrist. She notes she keeps filing it down. The patient states she does not have any trouble with her feet. She notes she can see her ankles today. The patient states the furosemide is working.    She notes she has lost a few pounds. The patient states she is not so much concerned about taking a pill or an injection. She notes she has been taking 1 teaspoon of apple cider vinegar in the morning. The patient states she has lost 4 pounds. She notes doing 5 minutes on the treadmill 2 or 3 times a day.      She notes her mother passed away on 02/21/2023.    Objective   Vital Signs:  /80 (BP Location: Right arm, Patient Position: Sitting, Cuff Size: Adult)   Pulse 62   Temp 95.9 °F (35.5 °C) (Temporal)   Resp 18   Ht 154.9 cm (60.98\")   Wt 104 kg (229 lb 3.2 oz)   SpO2 99%   BMI 43.33 kg/m²   Estimated body mass index is 43.33 kg/m² as calculated from the following:    Height as of this encounter: 154.9 cm (60.98\").    Weight as of this encounter: 104 kg (229 lb 3.2 oz).          Physical Exam  Constitutional:       General: She is not in acute distress.     Appearance: She is well-developed. She is not diaphoretic.   Cardiovascular:      Rate and Rhythm: Normal rate and regular rhythm.      Heart sounds: Normal heart sounds. No murmur heard.    No friction rub. No gallop.   Pulmonary:      Effort: Pulmonary effort is normal. No respiratory distress.      Breath sounds: Normal breath sounds. No wheezing or rales.   Abdominal:      General: Bowel sounds are normal. There is no distension.      Palpations: Abdomen is soft.      Tenderness: There is no abdominal " tenderness.   Musculoskeletal:      Cervical back: Neck supple.   Skin:     General: Skin is warm and dry.      Findings: Rash (bilateral breast with satellite lesions) present.   Neurological:      Mental Status: She is alert and oriented to person, place, and time.        Result Review :                Assessment and Plan   There are no diagnoses linked to this encounter.    1. Type 2 diabetes mellitus  - Blood glucose is spiking and dropping. I suspect this is related to stress.  - We have also discussed decreasing her long-acting insulin by 2 units. I also recommend taking her short-acting insulin within 15 minutes of eating. She verbalized understanding.    2. Rash under her breasts  - She was prescribed Diflucan as well as nystatin up to 4 times per day as needed for her rash.          Follow Up   No follow-ups on file.  Patient was given instructions and counseling regarding her condition or for health maintenance advice. Please see specific information pulled into the AVS if appropriate.       Transcribed from ambient dictation for BRIANNA Baires by Laxmi Brenner.  02/23/23   11:00 EST    Patient or patient representative verbalized consent to the visit recording.  I have personally performed the services described in this document as transcribed by the above individual, and it is both accurate and complete.  Answers for HPI/ROS submitted by the patient on 2/23/2023  Please describe your symptoms.: This is a wellness visit.  Have you had these symptoms before?: Yes  How long have you been having these symptoms?: Greater than 2 weeks  Please list any medications you are currently taking for this condition.: My prescribed medications plus Monistat cream.  Please describe any probable cause for these symptoms. : Routine wellness visit.  What is the primary reason for your visit?: Other

## 2023-03-08 NOTE — TELEPHONE ENCOUNTER
Can you please call and verify BP readings? Recommend d/c clonindine, it was only intended to use until bp was improved with other medication.    [No, patient denies ideation or behavior] : No, patient denies ideation or behavior [Low acute suicide risk] : Low acute suicide risk [No] : No

## 2023-03-20 ENCOUNTER — TRANSCRIBE ORDERS (OUTPATIENT)
Dept: ADMINISTRATIVE | Facility: HOSPITAL | Age: 73
End: 2023-03-20
Payer: MEDICARE

## 2023-03-20 DIAGNOSIS — N18.4 CHRONIC KIDNEY DISEASE, STAGE IV (SEVERE): Primary | ICD-10-CM

## 2023-03-20 DIAGNOSIS — N25.81 SECONDARY HYPERPARATHYROIDISM: ICD-10-CM

## 2023-03-20 DIAGNOSIS — I15.9 SECONDARY HYPERTENSION: ICD-10-CM

## 2023-03-28 RX ORDER — CARVEDILOL 25 MG/1
TABLET ORAL
Qty: 180 TABLET | Refills: 2 | Status: SHIPPED | OUTPATIENT
Start: 2023-03-28

## 2023-03-28 RX ORDER — LEVOTHYROXINE SODIUM 137 MCG
TABLET ORAL
Qty: 90 TABLET | Refills: 1 | Status: SHIPPED | OUTPATIENT
Start: 2023-03-28

## 2023-04-15 DIAGNOSIS — E10.22 TYPE 1 DIABETES MELLITUS WITH STAGE 4 CHRONIC KIDNEY DISEASE: ICD-10-CM

## 2023-04-15 DIAGNOSIS — N18.4 TYPE 1 DIABETES MELLITUS WITH STAGE 4 CHRONIC KIDNEY DISEASE: ICD-10-CM

## 2023-04-19 RX ORDER — CONJUGATED ESTROGENS 0.62 MG/G
CREAM VAGINAL
Qty: 30 G | Refills: 0 | Status: SHIPPED | OUTPATIENT
Start: 2023-04-19

## 2023-04-19 NOTE — TELEPHONE ENCOUNTER
Rx Refill Note  Requested Prescriptions     Pending Prescriptions Disp Refills   • Estrogens Conjugated (Premarin) 0.625 MG/GM vaginal cream [Pharmacy Med Name: PREMARIN VAGINAL CREAM-APPL] 30 g 0     Sig: APPLY SMALL AMOUNT TO AFFECTED AREA DAILY X 2 WEEKS, THEN TWICE WEEKLY      Last office visit with prescribing clinician: 2/23/2023   Last telemedicine visit with prescribing clinician: 6/23/2023   Next office visit with prescribing clinician: 6/23/2023       {TIP  Please add Last Relevant Lab Date if appropriate: 01/23/23                 Would you like a call back once the refill request has been completed: [] Yes [] No    If the office needs to give you a call back, can they leave a voicemail: [] Yes [] No    Robyn Luis MA  04/19/23, 13:36 EDT

## 2023-04-24 ENCOUNTER — OFFICE VISIT (OUTPATIENT)
Dept: FAMILY MEDICINE CLINIC | Facility: CLINIC | Age: 73
End: 2023-04-24
Payer: MEDICARE

## 2023-04-24 VITALS
WEIGHT: 230 LBS | DIASTOLIC BLOOD PRESSURE: 68 MMHG | TEMPERATURE: 97 F | HEART RATE: 61 BPM | RESPIRATION RATE: 12 BRPM | OXYGEN SATURATION: 99 % | SYSTOLIC BLOOD PRESSURE: 126 MMHG | BODY MASS INDEX: 43.43 KG/M2 | HEIGHT: 61 IN

## 2023-04-24 DIAGNOSIS — B35.1 NAIL FUNGUS: ICD-10-CM

## 2023-04-24 DIAGNOSIS — L60.0 INGROWN LEFT BIG TOENAIL: ICD-10-CM

## 2023-04-24 DIAGNOSIS — E10.21 TYPE 1 DIABETES MELLITUS WITH NEPHROPATHY: ICD-10-CM

## 2023-04-24 DIAGNOSIS — L60.9 NAIL PROBLEM: Primary | ICD-10-CM

## 2023-04-24 PROCEDURE — 99214 OFFICE O/P EST MOD 30 MIN: CPT | Performed by: NURSE PRACTITIONER

## 2023-04-24 PROCEDURE — 3044F HG A1C LEVEL LT 7.0%: CPT | Performed by: NURSE PRACTITIONER

## 2023-04-24 PROCEDURE — 3078F DIAST BP <80 MM HG: CPT | Performed by: NURSE PRACTITIONER

## 2023-04-24 PROCEDURE — 3074F SYST BP LT 130 MM HG: CPT | Performed by: NURSE PRACTITIONER

## 2023-04-24 RX ORDER — AMOXICILLIN AND CLAVULANATE POTASSIUM 875; 125 MG/1; MG/1
1 TABLET, FILM COATED ORAL 2 TIMES DAILY
Qty: 14 TABLET | Refills: 0 | Status: SHIPPED | OUTPATIENT
Start: 2023-04-24

## 2023-04-24 RX ORDER — TERBINAFINE HYDROCHLORIDE 250 MG/1
250 TABLET ORAL DAILY
Qty: 90 TABLET | Refills: 0 | Status: SHIPPED | OUTPATIENT
Start: 2023-04-24

## 2023-04-24 NOTE — PATIENT INSTRUCTIONS
Discharge instructions  Augmentin twice a day for the infection   Soak your foot twice a day for the next couple weeks elevate when sitting if increased pain redness swelling fever emergency room      Lab you can start after you finish the Augmentin take this for 3 months should you turn yellow tea colored urine's abdominal pain fatigue weakness emergency room  Stop the medication and seek immediate treatment this is rare follow-up podiatry for good foot care and nail trimming.

## 2023-05-04 ENCOUNTER — TELEPHONE (OUTPATIENT)
Dept: FAMILY MEDICINE CLINIC | Facility: CLINIC | Age: 73
End: 2023-05-04

## 2023-05-04 NOTE — TELEPHONE ENCOUNTER
Caller: Mendy Woods    Relationship: Self    Best call back number: [phone]    Who are you requesting to speak with (clinical staff, provider,  specific staff member): CLINICAL STAFF     What was the call regarding: PATIENT HAVING DIARRHEA ANS OTC PEPTO BISMOL IS NOT WORKING HAVING TROUBLE KEEPING HER SUGARS EVALUATED BUT NO OTHER SYMPTOMS WANTS TO KNOW IF THERE IS SOMETHING ELSE OTC SHE CAN TAKE CAN NOT MAKE IT TO URGENT CARE BECAUSE OF THE DIARRHEA AND JUST LOOKING FOR SOME GUIDANCE ON WHAT ELSE CAN BE DONE      Do you require a callback:YES

## 2023-05-04 NOTE — TELEPHONE ENCOUNTER
Spoke w/ pt informed Mendy of message from Felicia Duffy. Pt voiced understanding and no further questions. Pt already has a f/u appt scheduled.

## 2023-05-04 NOTE — TELEPHONE ENCOUNTER
She was on augmentin recently which can cause diarrhea, she can try imodium prn, cautious as too much can cause constipation. She can try a probiotic as well. For diarrhea lasting > 2 weeks, recommend follow up in office to screen for c. Diff.

## 2023-05-09 ENCOUNTER — OFFICE VISIT (OUTPATIENT)
Dept: FAMILY MEDICINE CLINIC | Facility: CLINIC | Age: 73
End: 2023-05-09
Payer: MEDICARE

## 2023-05-09 VITALS
RESPIRATION RATE: 18 BRPM | BODY MASS INDEX: 43.71 KG/M2 | TEMPERATURE: 96.1 F | DIASTOLIC BLOOD PRESSURE: 74 MMHG | WEIGHT: 231.5 LBS | OXYGEN SATURATION: 97 % | SYSTOLIC BLOOD PRESSURE: 128 MMHG | HEIGHT: 61 IN | HEART RATE: 77 BPM

## 2023-05-09 DIAGNOSIS — E10.21 TYPE 1 DIABETES MELLITUS WITH NEPHROPATHY: ICD-10-CM

## 2023-05-09 DIAGNOSIS — R19.7 DIARRHEA, UNSPECIFIED TYPE: Primary | ICD-10-CM

## 2023-05-09 PROCEDURE — 3078F DIAST BP <80 MM HG: CPT | Performed by: NURSE PRACTITIONER

## 2023-05-09 PROCEDURE — 3044F HG A1C LEVEL LT 7.0%: CPT | Performed by: NURSE PRACTITIONER

## 2023-05-09 PROCEDURE — 99214 OFFICE O/P EST MOD 30 MIN: CPT | Performed by: NURSE PRACTITIONER

## 2023-05-09 PROCEDURE — 3074F SYST BP LT 130 MM HG: CPT | Performed by: NURSE PRACTITIONER

## 2023-05-09 NOTE — PROGRESS NOTES
"Chief Complaint  Medication Problem (Amoxacillin, was given bc of ingrown toe nail/Had diarrhea 2 weeks, two days ago very loose stool. ) and Diabetes (While at the foot dr last Tuesday, sugar dropped to 60s. Got it back up, Still fighting to keep bloods sugar up/Haven't had all insulin shots in 3 days./Mucas bowel movement)    Subjective        Mendy Woods presents to [facility]  History of Present Illness  Mendy Woods is a 72-year-old female who presents to the clinic for a reaction to amoxicillin. She was treated back on 04/24/2023 with Augmentin secondary to potential ingrown toenail with early infection. She was also prescribed Lamisil for nail issues she is diabetic.    The patient describes cutting her toenail, and it was bleeding. She notes she was prescribed amoxicillin to prevent infection. She discontinued the amoxicillin on 05/05/2023 due to severe diarrhea. The patient began taking Imodium on 05/05/2023, which improved her symptoms. She notes on 05/09/2023 she experienced a solid stool; however, she states everything else she states her stool is described as \"little bits and pieces. \" She adds if she has the urge to defecate, she notices \"mucus. \" She denies fever, nausea, or hematochezia.    The patient's reports her blood glucose levels have been low for2.5 weeks. She reports on 05/08/2023 she took 1 fast-acting insulin injection. She notes she eats the same thing for breakfast daily. The patient notes she eats a variety of vegetables or salad for lunch. She adds when she was experiencing diarrhea, she was eating. The patient has been avoiding greasy or butter in her diet. She takes 38 units of Lantus in the morning and 7 units of fast-acting insulin 3 times daily. The patient reports she drank a soft drink to bring her levels back up before her appointment. She states her blood glucose was 186 mg/dL prior to today's visit, which is now 172 mg/dL. She adds " "this is the first time she has experienced low blood glucose levels she cannot account for.    The patient's  has diarrhea, which resolved with Imodium. She denies abdominal pain, nausea, or headaches. The patient experiences urinary urgency. She notes she wakes up 3 to 4 times per night to use the restroom. She states prior to this, she was experiencing 6 to 7 bowel movements per day. She reports since starting Imodium, she has 2 to 3 bowel movements per day.  Objective   Vital Signs:  /74 (BP Location: Right arm, Patient Position: Sitting, Cuff Size: Adult)   Pulse 77   Temp 96.1 °F (35.6 °C) (Temporal)   Resp 18   Ht 154.9 cm (60.98\")   Wt 105 kg (231 lb 8 oz)   SpO2 97%   BMI 43.76 kg/m²   Estimated body mass index is 43.76 kg/m² as calculated from the following:    Height as of this encounter: 154.9 cm (60.98\").    Weight as of this encounter: 105 kg (231 lb 8 oz).         A review of systems was performed, and the pertinent positives are noted in the HPI.      Physical Exam  Constitutional:       Appearance: Normal appearance.   Cardiovascular:      Rate and Rhythm: Normal rate and regular rhythm.      Heart sounds: No murmur heard.    No friction rub. No gallop.   Pulmonary:      Effort: Pulmonary effort is normal. No respiratory distress.      Breath sounds: Normal breath sounds. No wheezing, rhonchi or rales.   Chest:      Chest wall: No tenderness.   Abdominal:      General: Bowel sounds are normal. There is no distension.      Palpations: Abdomen is soft. There is no mass.      Tenderness: There is no abdominal tenderness.      Hernia: No hernia is present.   Skin:     General: Skin is warm and dry.   Neurological:      Mental Status: She is alert and oriented to person, place, and time.        /74 (BP Location: Right arm, Patient Position: Sitting, Cuff Size: Adult)   Pulse 77   Temp 96.1 °F (35.6 °C) (Temporal)   Resp 18   Ht 154.9 cm (60.98\")   Wt 105 kg (231 lb 8 oz)   " SpO2 97%   BMI 43.76 kg/m²         Result Review :                   Assessment and Plan   Diagnoses and all orders for this visit:    1. Diarrhea, unspecified type (Primary)  -     Clostridioides difficile Toxin, PCR - Stool, Per Rectum; Future  -     CBC & Differential  -     Clostridioides difficile Toxin, PCR - Stool, Per Rectum    2. Type 1 diabetes mellitus with nephropathy  -     Hemoglobin A1c  -     Comprehensive metabolic panel  -     CBC & Differential    The patient will discontinue the Imodium. She will complete a stool specimen and return to the clinic if she experiences diarrhea. She will complete laboratory studies as discussed during her visit, including an A1c, CMP, and CBC. She will decrease her Lantus to 34 units daily.         Follow Up   No follow-ups on file.  Patient was given instructions and counseling regarding her condition or for health maintenance advice. Please see specific information pulled into the AVS if appropriate.     Transcribed from ambient dictation for BRIANNA Baires by Lynette Arreguin.  05/09/23   13:05 EDT    Patient or patient representative verbalized consent to the visit recording.  I have personally performed the services described in this document as transcribed by the above individual, and it is both accurate and complete.

## 2023-05-10 ENCOUNTER — TELEPHONE (OUTPATIENT)
Dept: FAMILY MEDICINE CLINIC | Facility: CLINIC | Age: 73
End: 2023-05-10
Payer: MEDICARE

## 2023-05-10 LAB
ALBUMIN SERPL-MCNC: 3.8 G/DL (ref 3.5–5.2)
ALBUMIN/GLOB SERPL: 1.2 G/DL
ALP SERPL-CCNC: 135 U/L (ref 39–117)
ALT SERPL-CCNC: 9 U/L (ref 1–33)
AST SERPL-CCNC: 13 U/L (ref 1–32)
BASOPHILS # BLD AUTO: 0.04 10*3/MM3 (ref 0–0.2)
BASOPHILS NFR BLD AUTO: 0.3 % (ref 0–1.5)
BILIRUB SERPL-MCNC: 0.4 MG/DL (ref 0–1.2)
BUN SERPL-MCNC: 38 MG/DL (ref 8–23)
BUN/CREAT SERPL: 13.4 (ref 7–25)
CALCIUM SERPL-MCNC: 9.1 MG/DL (ref 8.6–10.5)
CHLORIDE SERPL-SCNC: 105 MMOL/L (ref 98–107)
CO2 SERPL-SCNC: 20.9 MMOL/L (ref 22–29)
CREAT SERPL-MCNC: 2.83 MG/DL (ref 0.57–1)
EGFRCR SERPLBLD CKD-EPI 2021: 17.1 ML/MIN/1.73
EOSINOPHIL # BLD AUTO: 0.3 10*3/MM3 (ref 0–0.4)
EOSINOPHIL NFR BLD AUTO: 2.1 % (ref 0.3–6.2)
ERYTHROCYTE [DISTWIDTH] IN BLOOD BY AUTOMATED COUNT: 13.3 % (ref 12.3–15.4)
GLOBULIN SER CALC-MCNC: 3.2 GM/DL
GLUCOSE SERPL-MCNC: 199 MG/DL (ref 65–99)
HBA1C MFR BLD: 7 % (ref 4.8–5.6)
HCT VFR BLD AUTO: 38.7 % (ref 34–46.6)
HGB BLD-MCNC: 12.5 G/DL (ref 12–15.9)
IMM GRANULOCYTES # BLD AUTO: 0.2 10*3/MM3 (ref 0–0.05)
IMM GRANULOCYTES NFR BLD AUTO: 1.4 % (ref 0–0.5)
LYMPHOCYTES # BLD AUTO: 1.59 10*3/MM3 (ref 0.7–3.1)
LYMPHOCYTES NFR BLD AUTO: 11.1 % (ref 19.6–45.3)
MCH RBC QN AUTO: 28.9 PG (ref 26.6–33)
MCHC RBC AUTO-ENTMCNC: 32.3 G/DL (ref 31.5–35.7)
MCV RBC AUTO: 89.4 FL (ref 79–97)
MONOCYTES # BLD AUTO: 0.87 10*3/MM3 (ref 0.1–0.9)
MONOCYTES NFR BLD AUTO: 6.1 % (ref 5–12)
NEUTROPHILS # BLD AUTO: 11.33 10*3/MM3 (ref 1.7–7)
NEUTROPHILS NFR BLD AUTO: 79 % (ref 42.7–76)
NRBC BLD AUTO-RTO: 0 /100 WBC (ref 0–0.2)
PLATELET # BLD AUTO: 212 10*3/MM3 (ref 140–450)
POTASSIUM SERPL-SCNC: 4.5 MMOL/L (ref 3.5–5.2)
PROT SERPL-MCNC: 7 G/DL (ref 6–8.5)
RBC # BLD AUTO: 4.33 10*6/MM3 (ref 3.77–5.28)
SODIUM SERPL-SCNC: 138 MMOL/L (ref 136–145)
WBC # BLD AUTO: 14.33 10*3/MM3 (ref 3.4–10.8)

## 2023-05-10 NOTE — TELEPHONE ENCOUNTER
Mendy called back to go over lab results. Pt voiced understanding and had a couple questions regarding sugar issue managing and also did a stool kit inquire if anything else needed

## 2023-05-11 LAB — C DIFF TOX GENS STL QL NAA+PROBE: POSITIVE

## 2023-05-11 RX ORDER — VANCOMYCIN HYDROCHLORIDE 125 MG/1
125 CAPSULE ORAL 4 TIMES DAILY
Qty: 40 CAPSULE | Refills: 0 | Status: SHIPPED | OUTPATIENT
Start: 2023-05-11

## 2023-06-01 ENCOUNTER — OFFICE VISIT (OUTPATIENT)
Dept: FAMILY MEDICINE CLINIC | Facility: CLINIC | Age: 73
End: 2023-06-01

## 2023-06-01 VITALS
RESPIRATION RATE: 18 BRPM | DIASTOLIC BLOOD PRESSURE: 78 MMHG | TEMPERATURE: 96.9 F | WEIGHT: 227.9 LBS | OXYGEN SATURATION: 97 % | HEART RATE: 77 BPM | SYSTOLIC BLOOD PRESSURE: 132 MMHG | HEIGHT: 61 IN | BODY MASS INDEX: 43.03 KG/M2

## 2023-06-01 DIAGNOSIS — A04.72 C. DIFFICILE COLITIS: ICD-10-CM

## 2023-06-01 DIAGNOSIS — E10.21 TYPE 1 DIABETES MELLITUS WITH NEPHROPATHY: Primary | ICD-10-CM

## 2023-06-01 DIAGNOSIS — R35.0 URINARY FREQUENCY: ICD-10-CM

## 2023-06-01 LAB
BILIRUB BLD-MCNC: NEGATIVE MG/DL
CLARITY, POC: CLEAR
COLOR UR: ABNORMAL
EXPIRATION DATE: ABNORMAL
GLUCOSE UR STRIP-MCNC: NEGATIVE MG/DL
KETONES UR QL: NEGATIVE
LEUKOCYTE EST, POC: ABNORMAL
Lab: ABNORMAL
NITRITE UR-MCNC: POSITIVE MG/ML
PH UR: 5.5 [PH] (ref 5–8)
PROT UR STRIP-MCNC: ABNORMAL MG/DL
RBC # UR STRIP: ABNORMAL /UL
SP GR UR: 1.02 (ref 1–1.03)
UROBILINOGEN UR QL: ABNORMAL

## 2023-06-01 PROCEDURE — 3075F SYST BP GE 130 - 139MM HG: CPT | Performed by: NURSE PRACTITIONER

## 2023-06-01 PROCEDURE — 3051F HG A1C>EQUAL 7.0%<8.0%: CPT | Performed by: NURSE PRACTITIONER

## 2023-06-01 PROCEDURE — 81003 URINALYSIS AUTO W/O SCOPE: CPT | Performed by: NURSE PRACTITIONER

## 2023-06-01 PROCEDURE — 99213 OFFICE O/P EST LOW 20 MIN: CPT | Performed by: NURSE PRACTITIONER

## 2023-06-01 PROCEDURE — 3078F DIAST BP <80 MM HG: CPT | Performed by: NURSE PRACTITIONER

## 2023-06-01 RX ORDER — PHENAZOPYRIDINE HYDROCHLORIDE 100 MG/1
TABLET, FILM COATED ORAL
COMMUNITY
Start: 2023-05-17

## 2023-06-01 NOTE — PROGRESS NOTES
Chief Complaint  Diabetes (Up and down readings. Since the antibiotic incident)    Subjective        Mendy Woods presents to [facility]  History of Present Illness  Mendy Woods presents to the clinic for a diabetic follow-up.     The patient reports her blood glucose levels have been fluctuating. She states this morning her blood glucose level was 150 mg/dL. She notes she takes her long-acting insulin between 6:00 AM and 6:30 AM. She reports her blood glucose levels began to drop at 6:15 AM today. She adds she ate 4 pieces of toast, which is not a lot of calories. She adds she did not have juice with it. She reports she had hot tea made with sweet and low and lemon juice. She reports she had a piece of sponge cake and her blood glucose increased to 152 mg/dL. She notes she has been experiencing low blood glucose levels for the entire day.      She reports her lowest blood glucose level was 42 mg/dL. She notes her machine will alarm her when she is below 70 mg/dL. She notes she can usually get it back up within 10 to 15 minutes. She reports she eats early, and she tends to have stuff by the bed for snacks like club crackers or applesauce.      She states she is still struggling with her bowels. She notes she has been taking Imodium. She notes this all started when she got on the amoxicillin. She reports for 4 weeks she has been experiencing an upset stomach. She denies vomiting. She reports she finished the vancomycin with benefit. She notes she has been off the vancomycin for 4 to 5 days, and her bowel movements were loose, and wet.       She states the important thing for her is her low blood glucose levels. She adds she has only taken 3 insulin injections this week. She reports she has not taken any insulin injections today. She notes her blood glucose is 152 mg/dL with 2 courses of eating. She notes she did take the long-acting insulin. She notes she did take 32 units of  "long-acting insulin this morning.       She inquires if her kidneys are declining faster than they should be. She notes she has had to watch what she eats, and she is not losing weight. She adds she feels like her face and eyes are swollen. She reports she has an appointment with the nephrologist in 06/2023 to have an examination of her bladder.      She reports she has not been taking the furosemide; however, she notes she has not needed it. She notes her ankles have decreased.      The patient states she is experiencing a yeast infection. She reports she experiences frequent urination. She notes she is on her second day of the AZO, and it is better.  She adds she is not sure if the AZO is damaging her kidneys.      She notes she has been eating almost no protein right now. She adds she is eating egg white omelets. She notes beef makes her want to gag.   Objective   Vital Signs:  /78 (BP Location: Right arm, Patient Position: Sitting, Cuff Size: Adult)   Pulse 77   Temp 96.9 °F (36.1 °C) (Temporal)   Resp 18   Ht 154.9 cm (60.98\")   Wt 103 kg (227 lb 14.4 oz)   SpO2 97%   BMI 43.08 kg/m²   Estimated body mass index is 43.08 kg/m² as calculated from the following:    Height as of this encounter: 154.9 cm (60.98\").    Weight as of this encounter: 103 kg (227 lb 14.4 oz).         A review of systems was performed, and the pertinent positives are noted in the HPI.      Physical Exam   /78 (BP Location: Right arm, Patient Position: Sitting, Cuff Size: Adult)   Pulse 77   Temp 96.9 °F (36.1 °C) (Temporal)   Resp 18   Ht 154.9 cm (60.98\")   Wt 103 kg (227 lb 14.4 oz)   SpO2 97%   BMI 43.08 kg/m²     General Appearance:  Alert, cooperative, no distress, appears stated age   Head:  Normocephalic, without obvious abnormality, atraumatic   Eyes:  PERRL, conjunctiva/corneas clear, EOM's intact, fundi benign, both eyes   Ears:  Normal TM's and external ear canals, both ears   Nose: Nares normal, septum " midline,mucosa normal, no drainage or sinus tenderness   Throat: Lips, mucosa, and tongue normal; teeth and gums normal   Neck: Supple, symmetrical, trachea midline, no adenopathy;  thyroid: not enlarged, symmetric, no tenderness/mass/nodules; no carotid bruit or JVD   Back:   Symmetric, no curvature, ROM normal, no CVA tenderness   Lungs:   Clear to auscultation bilaterally, respirations unlabored   Breasts:  No masses or tenderness   Heart:  Regular rate and rhythm, S1 and S2 normal, no murmur, rub, or gallop   Abdomen:   Soft, non-tender, bowel sounds active all four quadrants,  no masses, no organomegaly   Pelvic: Deferred   Extremities: Extremities normal, atraumatic, no cyanosis or edema   Pulses: 2+ and symmetric   Skin: Skin color, texture, turgor normal, no rashes or lesions   Lymph nodes: Cervical, supraclavicular, and axillary nodes normal   Neurologic: Normal       Result Review :                   Assessment and Plan   Diagnoses and all orders for this visit:    1. Type 1 diabetes mellitus with nephropathy (Primary)  -     Ambulatory Referral to Endocrinology    2. C. difficile colitis    3. Urinary frequency  -     POC Urinalysis Dipstick, Automated  -     Urine Culture - Urine, Urine, Clean Catch    Encouraged the patient to hold on the AZO right now. She will not complete any laboratory studies today. She will complete a urine culture today. Informed the patient to let me know if she starts to experience diarrhea of the Imodium, so she can restart the vancomycin. She will decrease her insulin to 32 units and see how she does. Submitted a referral to Dr. Scott. She will follow up in 3 weeks.           Follow Up   No follow-ups on file.  Patient was given instructions and counseling regarding her condition or for health maintenance advice. Please see specific information pulled into the AVS if appropriate.     Transcribed from ambient dictation for BRIANNA Baires by Lynette  Rodríguez.  06/01/23   16:08 EDT    Patient or patient representative verbalized consent to the visit recording.  I have personally performed the services described in this document as transcribed by the above individual, and it is both accurate and complete.

## 2023-06-03 LAB
BACTERIA UR CULT: NORMAL
BACTERIA UR CULT: NORMAL

## 2023-06-13 ENCOUNTER — TELEPHONE (OUTPATIENT)
Dept: FAMILY MEDICINE CLINIC | Facility: CLINIC | Age: 73
End: 2023-06-13

## 2023-06-13 NOTE — TELEPHONE ENCOUNTER
Mimi from Dr. Scott office called regarding referral [id number] stated pt has been seen at a [facility] and would recommend to be seen there she has limited availability

## 2023-07-24 ENCOUNTER — OFFICE VISIT (OUTPATIENT)
Dept: CARDIOLOGY | Facility: CLINIC | Age: 73
End: 2023-07-24
Payer: MEDICARE

## 2023-07-24 ENCOUNTER — TELEPHONE (OUTPATIENT)
Dept: FAMILY MEDICINE CLINIC | Facility: CLINIC | Age: 73
End: 2023-07-24

## 2023-07-24 ENCOUNTER — TELEPHONE (OUTPATIENT)
Dept: FAMILY MEDICINE CLINIC | Facility: CLINIC | Age: 73
End: 2023-07-24
Payer: MEDICARE

## 2023-07-24 VITALS
DIASTOLIC BLOOD PRESSURE: 53 MMHG | SYSTOLIC BLOOD PRESSURE: 107 MMHG | HEART RATE: 59 BPM | HEIGHT: 61 IN | WEIGHT: 224 LBS | BODY MASS INDEX: 42.29 KG/M2

## 2023-07-24 DIAGNOSIS — E78.5 HYPERLIPIDEMIA, UNSPECIFIED HYPERLIPIDEMIA TYPE: Primary | ICD-10-CM

## 2023-07-24 DIAGNOSIS — I12.9 BENIGN HYPERTENSION WITH CKD (CHRONIC KIDNEY DISEASE) STAGE III: ICD-10-CM

## 2023-07-24 DIAGNOSIS — E66.01 MORBID OBESITY WITH BMI OF 40.0-44.9, ADULT: ICD-10-CM

## 2023-07-24 DIAGNOSIS — N18.30 BENIGN HYPERTENSION WITH CKD (CHRONIC KIDNEY DISEASE) STAGE III: ICD-10-CM

## 2023-07-24 PROCEDURE — 93000 ELECTROCARDIOGRAM COMPLETE: CPT | Performed by: INTERNAL MEDICINE

## 2023-07-24 PROCEDURE — 3074F SYST BP LT 130 MM HG: CPT | Performed by: INTERNAL MEDICINE

## 2023-07-24 PROCEDURE — 1159F MED LIST DOCD IN RCRD: CPT | Performed by: INTERNAL MEDICINE

## 2023-07-24 PROCEDURE — 3078F DIAST BP <80 MM HG: CPT | Performed by: INTERNAL MEDICINE

## 2023-07-24 PROCEDURE — 99214 OFFICE O/P EST MOD 30 MIN: CPT | Performed by: INTERNAL MEDICINE

## 2023-07-24 PROCEDURE — 1160F RVW MEDS BY RX/DR IN RCRD: CPT | Performed by: INTERNAL MEDICINE

## 2023-07-24 NOTE — PROGRESS NOTES
[facility] Follow Up Office Note     Encounter Date:23  Patient:Mendy Woods  :1950  MRN:[id number]      Chief Complaint:   Chief Complaint   Patient presents with    Hypertension     6 month f/u       History of Presenting Illness:      Ms. Marrero is a 73 y.o. woman with past medical history notable for hypertension, mixed hyperlipidemia, morbid obesity, diabetes type 2 on insulin therapy, and chronic kidney disease stage IIIa who presents to our office for scheduled follow up.  Overall blood pressure is doing reasonable.  She has gone to taking her losartan twice daily which seems to help out very well with her blood pressure.  She had issues that she took amoxicillin a few months ago which caused significant diarrhea and weight loss check status stop her diuretic for a few days but has since recovered she is now back to taking her diuretic every other day..        Review of Systems:  Review of Systems   Constitutional: Positive for malaise/fatigue.   HENT: Negative.     Eyes: Negative.    Cardiovascular:  Positive for leg swelling.   Respiratory: Negative.     Endocrine: Negative.    Hematologic/Lymphatic: Negative.    Skin: Negative.    Musculoskeletal: Negative.    Gastrointestinal: Negative.    Genitourinary: Negative.    Neurological: Negative.    Psychiatric/Behavioral: Negative.     Allergic/Immunologic: Negative.      Current Outpatient Medications on File Prior to Visit   Medication Sig Dispense Refill    aspirin 81 MG EC tablet Take  by mouth.      atorvastatin (LIPITOR) 80 MG tablet TAKE 1 TABLET BY MOUTH EVERY DAY 90 tablet 3    carvedilol (COREG) 25 MG tablet TAKE 1 TABLET BY MOUTH TWICE A DAY WITH MEALS 180 tablet 2    cloNIDine (CATAPRES) 0.1 MG tablet TAKE 1 TABLET BY MOUTH TWICE A  tablet 2    Cobalamin Combinations (B-12) 100-5000 MCG sublingual tablet Place  under the tongue.      Continuous Blood Gluc  (FreeStyle Hien 14 Day [place]) device 1  KIT EVERY 3 (THREE) MONTHS. 1 each 1    Continuous Blood Gluc  (FreeStyle Hien 2 [place]) device 1 EACH TAKE AS DIRECTED. 1 each 0    Continuous Blood Gluc Sensor (FreeStyle Hien 2 Sensor) misc 1 APPLICATION EVERY 14 (FOURTEEN) DAYS. 6 each 2    Cranberry 50 MG chewable tablet Chew.      Diclofenac Sodium (VOLTAREN) 1 % gel gel Apply 4 g topically to the appropriate area as directed 4 (Four) Times a Day As Needed.      Estrogens Conjugated (Premarin) 0.625 MG/GM vaginal cream APPLY SMALL AMOUNT TO AFFECTED AREA DAILY X 2 WEEKS, THEN TWICE WEEKLY 30 g 0    ferrous sulfate 324 (65 Fe) MG tablet delayed-release EC tablet Take 1 tablet by mouth 2 (Two) Times a Day With Meals.      furosemide (LASIX) 40 MG tablet Take 0.5 tablets by mouth As Needed (swelling). 03/30/22-Every other day 90 tablet 3    glucose monitor monitoring kit 1 each As Needed (Type 1 dm). 1 each 0    Insulin Lispro, 1 Unit Dial, (HumaLOG KwikPen) 100 UNIT/ML solution pen-injector 7 units 3 times a day with meals. 100 mL 3    Insulin Pen Needle (BD Pen Needle Neli U/F) 32G X 4 MM misc 1 Device 2 (two) times a day. 200 each 1    Lantus SoloStar 100 UNIT/ML injection pen INJECT 45 UNITS SUBCUTANEOUSLY DAILY 45 mL 1    losartan (COZAAR) 25 MG tablet Take 1 tablet by mouth Daily. (Patient taking differently: Take 1 tablet by mouth 2 (Two) Times a Day.) 180 tablet 3    nystatin (MYCOSTATIN) 587678 UNIT/GM powder Apply  topically to the appropriate area as directed 4 (Four) Times a Day. 60 g 3    Synthroid 137 MCG tablet TAKE 1 TABLET BY MOUTH EVERY DAY 90 tablet 1    Vitamin D, Cholecalciferol, (CHOLECALCIFEROL) 10 MCG (400 UNIT) tablet Take 1 tablet by mouth Daily.      vitamin E 100 UNIT capsule Take 1 capsule by mouth Daily.      [DISCONTINUED] levocetirizine (XYZAL) 5 MG tablet Take 5 mg by mouth As Needed.       No current facility-administered medications on file prior to visit.       Allergies   Allergen Reactions    Amoxicillin Diarrhea     Augmentin [Amoxicillin-Pot Clavulanate] Diarrhea    Sulfa Antibiotics Other (See Comments)     Facial swelling    Sulfamethazine Swelling       Past Medical History:   Diagnosis Date    Abnormal blood chemistry test     Allergic Sulpha    @1995 treated for urinary tract infection - and treated with    Allergic rhinitis     Anemia, B12 deficiency     Arthritis     Cataract age 67    Removed by laser surgury here in [place]    Cutaneous lupus erythematosus     Dermatitis     Disorder of kidney and ureter     Edema of optic nerve     Herpes zoster     HL (hearing loss)     Tested but waited till Covid cleared    Hyperlipidemia     Hypertension     Hypothyroidism Spring 1968    Laryngitis     Low back pain     Obesity     Come on!!!    Pernicious anemia     Pneumonia @1998    had 3 Pneumonia shots as vaccines available    Renal failure     Renal insufficiency 2020    Sarcoidosis     Skin rash     Tonsillitis     Type 1 diabetes mellitus     Type 2 diabetes mellitus, controlled     Urinary tract infection yep    Visual impairment I wear reading classes    Vitamin B deficiency        Past Surgical History:   Procedure Laterality Date    BREAST SURGERY  1978    removed small benign mass right breast    CARDIAC SURGERY      COLONOSCOPY  2007    Normal.    EYE SURGERY  removed cataract    FRACTURE SURGERY      GASTROSTOMY      HERNIA REPAIR      HYSTERECTOMY      INGUINAL HERNIA REPAIR      JOINT REPLACEMENT      LYMPH NODE BIOPSY      sarcoid       Social History     Socioeconomic History    Marital status:    Tobacco Use    Smoking status: Never    Smokeless tobacco: Never    Tobacco comments:     Don't like it   Vaping Use    Vaping Use: Never used   Substance and Sexual Activity    Alcohol use: Not Currently     Comment: Only very occasionally    Drug use: Never    Sexual activity: Yes     Partners: Male     Birth control/protection: Post-menopausal, None     Comment: My 71-year old  is still sexy!  "...and a good lover!       Family History   Problem Relation Age of Onset    Thyroid disease Mother         Hypothyroid    Diabetes Father           from diabetes complications    Heart attack Father     Hashimoto's thyroiditis Sister     Hashimoto's thyroiditis Daughter     Diabetes Paternal Grandmother     Cancer Paternal Grandmother     Hashimoto's thyroiditis Sister     Hashimoto's thyroiditis Daughter     Cancer Paternal Grandfather         Cigar smoker/ Cancer of the mouth    Diabetes Paternal Grandfather        The following portions of the patient's history were reviewed and updated as appropriate: allergies, current medications, past family history, past medical history, past social history, past surgical history and problem list.       Objective:       Vitals:    23 1403   BP: 107/53   BP Location: Left arm   Patient Position: Sitting   Pulse: 59   Weight: 102 kg (224 lb)   Height: 154.9 cm (60.98\")       Body mass index is 42.35 kg/m².    Physical Exam:  Constitutional: Well appearing, Well-developed, No acute distress   HENT: Oropharynx clear and membrane moist  Eyes: Normal conjunctiva, no sclera icterus.  Neck: Supple, no carotid bruit bilaterally.  Cardiovascular: Regular rate and rhythm, No Murmur, Trace bilateral lower extremity edema.  Pulmonary: Normal respiratory effort, normal lung sounds, no wheezing.  Neurological: Alert and orient x 3.   Skin: Warm, dry, no ecchymosis, no rash.  Psych: Appropriate mood and affect. Normal judgment and insight.         Lab Results   Component Value Date     2023     2023    K 4.5 2023    K 4.9 2023     2023     2023    CO2 20.9 (L) 2023    CO2 26.9 2023    BUN 38 (H) 2023    BUN 52 (H) 2023    CREATININE 2.83 (H) 2023    CREATININE 2.69 (H) 2023    EGFRIFNONA 20 (L) 2022    EGFRIFNONA 26 (L) 2022    EGFRIFAFRI 23 (L) 2022    EGFRIFAFRI " 30 (L) 11/18/2021    GLUCOSE 199 (H) 05/09/2023    GLUCOSE 94 01/23/2023    CALCIUM 9.1 05/09/2023    CALCIUM 9.8 01/23/2023    CALCIUM 9.6 01/23/2023    PROTENTOTREF 7.0 05/09/2023    PROTENTOTREF 7.4 02/18/2022    ALBUMIN 3.8 05/09/2023    ALBUMIN 4.2 01/17/2023    BILITOT 0.4 05/09/2023    BILITOT 0.4 02/18/2022    AST 13 05/09/2023    AST 16 02/18/2022    ALT 9 05/09/2023    ALT 11 02/18/2022     Lab Results   Component Value Date    WBC 14.33 (H) 05/09/2023    WBC 8.74 01/23/2023    HGB 12.5 05/09/2023    HGB 11.4 (L) 01/23/2023    HCT 38.7 05/09/2023    HCT 36.0 01/23/2023    MCV 89.4 05/09/2023    MCV 90.7 01/23/2023     05/09/2023     01/23/2023     Lab Results   Component Value Date    CHOL 200 08/12/2022    CHOL 148 10/23/2020    TRIG 158 (H) 08/12/2022    TRIG 145 07/21/2021    HDL 42 08/12/2022    HDL 47 07/21/2021     (H) 08/12/2022    LDL 94 07/21/2021     Lab Results   Component Value Date    PROBNP 1,444.0 (H) 01/10/2022    PROBNP 2,004 (H) 12/06/2021     Lab Results   Component Value Date    TROPONINT 0.013 01/10/2022     Lab Results   Component Value Date    TSH 0.678 08/12/2022    TSH 0.918 01/10/2022         ECG 12 Lead    Date/Time: 7/24/2023 3:11 PM  Performed by: Yordan Fuchs MD  Authorized by: Yordan Fuchs MD   Comparison: compared with previous ECG from 5/1/2023  Similar to previous ECG  Rhythm: sinus rhythm    Holter monitor 1/12/2022:  A normal monitor study.  Underlying heart rhythm was sinus rhythm with an average heart rate of 66 bpm and a heart rate range of 51 bpm up to 96 bpm  1 episode of second-degree AV block Mobitz type I (Wenckebach) at 10:30 in the morning  No symptoms reported during study.    Echocardiogram 12/20/2021:  Estimated right ventricular systolic pressure from tricuspid regurgitation is normal (<35 mmHg). Calculated right ventricular systolic pressure from tricuspid regurgitation is 13 mmHg.  Left ventricular wall thickness is  consistent with mild concentric hypertrophy.  Estimated left ventricular EF = 69% Left ventricular systolic function is normal.  Left ventricular diastolic function was normal.  Mild dilation of the aortic root is present.            Assessment:          Diagnosis Plan   1. Hyperlipidemia, unspecified hyperlipidemia type  Lipid Panel      2. Benign hypertension with CKD (chronic kidney disease) stage III  ECG 12 Lead      3. Morbid obesity with BMI of 40.0-44.9, adult               Plan:       Ms. Marrero is a 73 y.o. woman with past medical history notable for hypertension, mixed hyperlipidemia, morbid obesity, diabetes type 2 on insulin therapy, and chronic kidney disease stage IIIa who presents to our office for scheduled follow up.  Overall patient doing reasonably well I would continue the current medical regimen.  Will follow up on lipid panel.    Hypertension/chronic kidney disease stage IV:  Continue carvedilol 25 mg twice daily  Continue 40 mg Lasix every other day  Continue losartan twice daily  Continue clonidine but can stop if blood pressure gets better    Mixed hyperlipidemia:  Continue high potency statin  Lipid panel 7/2021 demonstrates no controlled LDL and total cholesterol  CMP 5/2022 demonstrates normal ALT and AST      Follow Up:  6 Months      Thank you for allowing me to participate in the care of Mendy Woods. Feel free to contact me directly with any further questions or concerns.    Yordan Fuchs MD  [facility]  07/24/23  15:11 EDT

## 2023-07-24 NOTE — TELEPHONE ENCOUNTER
Caller: Mendy Woods    Relationship to patient: Self    Best call back number: [phone]     Patient is needing: PATIENT STATES THAT DR. CATALAN(ENDOCRINOLOGIST) WASN'T COMFORTABLE TAKING HER AS A NEW PATIENT. PATIENT IS NEEDING A NEW REFERRAL IF PROVIDER WANTS HER TO SEE AN ENDO. PLEASE ADVISE.

## 2023-07-24 NOTE — TELEPHONE ENCOUNTER
Caller: [facility]     Relationship to patient: INSURANCE     Best call back number: [phone] (PATIENT'S NUMBER)    Patient is needing: WANTING TO SEE SPECIFIC ENDOCRINOLOGIST, THEY ARE NEEDING RECORDS FAXED IN ORDER FOR HER TO BE SCHEDULED.     DR VANE CATALAN [address] [place] [address] [address], [address] [phone] UNABLE TO PROVIDE FAX INFORMATION

## 2023-07-25 NOTE — TELEPHONE ENCOUNTER
Noted   PreOp Instructions given:   - Verbal medication information (what to hold and what to take)   - NPO guidelines   - Arrival place directions given; time to be given the day before procedure by the   Surgeon's Office   - Bathing with antibacterial soap   - Don't wear any jewelry or bring any valuables AM of surgery   - No makeup or moisturizer to face   - No perfume/cologne, powder, lotions or aftershave   Pt. verbalized understanding.   Denies any  history of side effects or issues with anesthesia or sedation.

## 2023-07-26 ENCOUNTER — TELEPHONE (OUTPATIENT)
Dept: FAMILY MEDICINE CLINIC | Facility: CLINIC | Age: 73
End: 2023-07-26

## 2023-07-26 NOTE — TELEPHONE ENCOUNTER
Hub staff attempted to follow warm transfer process and was unsuccessful     Caller: Mendy Woods    Relationship to patient: Self    Best call back number: [phone]     Patient is needing: PLEASE CALL PATIENT TO GIVE HER THE PHONE NUMBER FOR THE LABS DOWN STAIRS.

## 2023-08-01 ENCOUNTER — LAB (OUTPATIENT)
Dept: LAB | Facility: HOSPITAL | Age: 73
End: 2023-08-01
Payer: MEDICARE

## 2023-08-01 ENCOUNTER — TRANSCRIBE ORDERS (OUTPATIENT)
Dept: ADMINISTRATIVE | Facility: HOSPITAL | Age: 73
End: 2023-08-01
Payer: MEDICARE

## 2023-08-01 DIAGNOSIS — N18.4 CHRONIC KIDNEY DISEASE, STAGE IV (SEVERE): Primary | ICD-10-CM

## 2023-08-01 DIAGNOSIS — N18.4 CHRONIC KIDNEY DISEASE, STAGE IV (SEVERE): ICD-10-CM

## 2023-08-01 DIAGNOSIS — E55.9 VITAMIN D INSUFFICIENCY: ICD-10-CM

## 2023-08-01 DIAGNOSIS — E21.1 MILD SECONDARY HYPERPARATHYROIDISM: ICD-10-CM

## 2023-08-01 DIAGNOSIS — I10 ESSENTIAL HYPERTENSION, BENIGN: ICD-10-CM

## 2023-08-01 LAB
25(OH)D3 SERPL-MCNC: 32.5 NG/ML (ref 30–100)
ANION GAP SERPL CALCULATED.3IONS-SCNC: 9.7 MMOL/L (ref 5–15)
BACTERIA UR QL AUTO: ABNORMAL /HPF
BILIRUB UR QL STRIP: NEGATIVE
BUN SERPL-MCNC: 36 MG/DL (ref 8–23)
BUN/CREAT SERPL: 14.6 (ref 7–25)
CALCIUM SPEC-SCNC: 9.1 MG/DL (ref 8.6–10.5)
CALCIUM SPEC-SCNC: 9.4 MG/DL (ref 8.6–10.5)
CHLORIDE SERPL-SCNC: 106 MMOL/L (ref 98–107)
CLARITY UR: ABNORMAL
CO2 SERPL-SCNC: 23.3 MMOL/L (ref 22–29)
COLOR UR: YELLOW
CREAT SERPL-MCNC: 2.46 MG/DL (ref 0.57–1)
DEPRECATED RDW RBC AUTO: 44.3 FL (ref 37–54)
EGFRCR SERPLBLD CKD-EPI 2021: 20.1 ML/MIN/1.73
ERYTHROCYTE [DISTWIDTH] IN BLOOD BY AUTOMATED COUNT: 13.2 % (ref 12.3–15.4)
GLUCOSE SERPL-MCNC: 260 MG/DL (ref 65–99)
GLUCOSE UR STRIP-MCNC: NEGATIVE MG/DL
HCT VFR BLD AUTO: 37.2 % (ref 34–46.6)
HGB BLD-MCNC: 11.9 G/DL (ref 12–15.9)
HGB UR QL STRIP.AUTO: ABNORMAL
HYALINE CASTS UR QL AUTO: ABNORMAL /LPF
KETONES UR QL STRIP: NEGATIVE
LEUKOCYTE ESTERASE UR QL STRIP.AUTO: ABNORMAL
MAGNESIUM SERPL-MCNC: 2 MG/DL (ref 1.6–2.4)
MCH RBC QN AUTO: 28.8 PG (ref 26.6–33)
MCHC RBC AUTO-ENTMCNC: 32 G/DL (ref 31.5–35.7)
MCV RBC AUTO: 90.1 FL (ref 79–97)
NITRITE UR QL STRIP: NEGATIVE
PH UR STRIP.AUTO: 5.5 [PH] (ref 5–8)
PHOSPHATE SERPL-MCNC: 4 MG/DL (ref 2.5–4.5)
PLATELET # BLD AUTO: 179 10*3/MM3 (ref 140–450)
PMV BLD AUTO: 9.1 FL (ref 6–12)
POTASSIUM SERPL-SCNC: 5.5 MMOL/L (ref 3.5–5.2)
PROT UR QL STRIP: ABNORMAL
PTH-INTACT SERPL-MCNC: 78.4 PG/ML (ref 15–65)
RBC # BLD AUTO: 4.13 10*6/MM3 (ref 3.77–5.28)
RBC # UR STRIP: ABNORMAL /HPF
REF LAB TEST METHOD: ABNORMAL
SODIUM SERPL-SCNC: 139 MMOL/L (ref 136–145)
SP GR UR STRIP: 1.02 (ref 1–1.03)
SQUAMOUS #/AREA URNS HPF: ABNORMAL /HPF
UROBILINOGEN UR QL STRIP: ABNORMAL
WBC # UR STRIP: ABNORMAL /HPF
WBC NRBC COR # BLD: 9.07 10*3/MM3 (ref 3.4–10.8)

## 2023-08-01 PROCEDURE — 80048 BASIC METABOLIC PNL TOTAL CA: CPT

## 2023-08-01 PROCEDURE — 82306 VITAMIN D 25 HYDROXY: CPT

## 2023-08-01 PROCEDURE — 84100 ASSAY OF PHOSPHORUS: CPT

## 2023-08-01 PROCEDURE — 36415 COLL VENOUS BLD VENIPUNCTURE: CPT

## 2023-08-01 PROCEDURE — 81001 URINALYSIS AUTO W/SCOPE: CPT

## 2023-08-01 PROCEDURE — 83970 ASSAY OF PARATHORMONE: CPT

## 2023-08-01 PROCEDURE — 83735 ASSAY OF MAGNESIUM: CPT

## 2023-08-01 PROCEDURE — 85027 COMPLETE CBC AUTOMATED: CPT

## 2023-08-02 ENCOUNTER — TELEPHONE (OUTPATIENT)
Dept: FAMILY MEDICINE CLINIC | Facility: CLINIC | Age: 73
End: 2023-08-02
Payer: MEDICARE

## 2023-08-03 ENCOUNTER — TELEPHONE (OUTPATIENT)
Dept: FAMILY MEDICINE CLINIC | Facility: CLINIC | Age: 73
End: 2023-08-03

## 2023-08-03 RX ORDER — LEVOTHYROXINE SODIUM 137 MCG
TABLET ORAL
Qty: 90 TABLET | Refills: 1 | Status: SHIPPED | OUTPATIENT
Start: 2023-08-03

## 2023-08-03 NOTE — TELEPHONE ENCOUNTER
Would she like to see another endo provider, we have [facility] providers, there is Dr. Lockwood and Dr. Crane (ariana/DANIELLE)

## 2023-08-03 NOTE — TELEPHONE ENCOUNTER
Caller: Mendy Woods    Relationship: Self    Best call back number: [phone]     What is the best time to reach you: ANY    Who are you requesting to speak with (clinical staff, provider,  specific staff member): REECE LEO    What was the call regarding: PATIENT STATES SHE REACHED OUT TO VANE CATALAN'S OFFICE AND WAS TOLD THEY ARE NOT TAKING NEW PATIENTS. PATIENT IS REQUESTING A CALL BACK FROM REECE LEO TO DISCUSS.     Is it okay if the provider responds through MyChart: NO

## 2023-08-04 NOTE — TELEPHONE ENCOUNTER
Spoke with patient in detail. States she will see who ever Clive thinks is a good fit for her. Please send over order.

## 2023-08-08 DIAGNOSIS — E10.21 TYPE 1 DIABETES MELLITUS WITH NEPHROPATHY: Primary | ICD-10-CM

## 2023-08-08 NOTE — TELEPHONE ENCOUNTER
Spoke to patient and she verbally understood.  She heard the new strand of covid  when would you recommend her getting it. She has had Shingles, and a shot, when she seen you resent you advised her to get the shot, with shingles and a shot already what are you recommending, another shingles vaccine.     Also she is asking about the pneumonia shot?

## 2023-08-08 NOTE — TELEPHONE ENCOUNTER
Shingles is the only other vaccine she is due  In regards to covid vaccine, I recommend in the fall

## 2023-08-09 DIAGNOSIS — E78.00 PURE HYPERCHOLESTEROLEMIA: ICD-10-CM

## 2023-08-09 RX ORDER — ATORVASTATIN CALCIUM 80 MG/1
80 TABLET, FILM COATED ORAL DAILY
Qty: 90 TABLET | Refills: 3 | Status: SHIPPED | OUTPATIENT
Start: 2023-08-09

## 2023-08-09 NOTE — TELEPHONE ENCOUNTER
Caller: Mendy Woods    Relationship: Self    Best call back number: [phone]     Requested Prescriptions:   Requested Prescriptions     Pending Prescriptions Disp Refills    atorvastatin (LIPITOR) 80 MG tablet 90 tablet 3     Sig: Take 1 tablet by mouth Daily.        Pharmacy where request should be sent: [facility]/PHARMACY #[id number] [place], [place] - [address] [facility]. AT [facility] [phone] [facility] [phone] FX     Last office visit with prescribing clinician: 2023   Last telemedicine visit with prescribing clinician: Visit date not found   Next office visit with prescribing clinician: 10/24/2023     Additional details provided by patient:     PATIENT OLD SCRIPT HAS  AND SHE NEEDS A NEW SCRIPT SENT IN      ANY QUESTIONS OR CONCERNS PLEASE CALL PATEINT          Does the patient have less than a 3 day supply:  [x] Yes  [] No    Would you like a call back once the refill request has been completed: [] Yes [] No    If the office needs to give you a call back, can they leave a voicemail: [] Yes [] No    Chiki Berman Rep   23 11:31 EDT

## 2023-08-10 DIAGNOSIS — N18.4 TYPE 1 DIABETES MELLITUS WITH STAGE 4 CHRONIC KIDNEY DISEASE: ICD-10-CM

## 2023-08-10 DIAGNOSIS — E10.22 TYPE 1 DIABETES MELLITUS WITH STAGE 4 CHRONIC KIDNEY DISEASE: ICD-10-CM

## 2023-08-23 RX ORDER — TERBINAFINE HYDROCHLORIDE 250 MG/1
TABLET ORAL
Qty: 90 TABLET | Refills: 0 | OUTPATIENT
Start: 2023-08-23

## 2023-08-23 NOTE — TELEPHONE ENCOUNTER
Rx Refill Note  Requested Prescriptions     Pending Prescriptions Disp Refills    terbinafine (lamiSIL) 250 MG tablet [Pharmacy Med Name: TERBINAFINE  MG TABLET] 90 tablet 0     Sig: TAKE 1 TABLET BY MOUTH EVERY DAY      Last office visit with prescribing clinician: 4/24/2023   Last telemedicine visit with prescribing clinician: Visit date not found   Next office visit with prescribing clinician: Visit date not found                         Would you like a call back once the refill request has been completed: [] Yes [] No    If the office needs to give you a call back, can they leave a voicemail: [] Yes [] No    Mat Poole MA  08/23/23, 14:38 EDT

## 2023-09-18 RX ORDER — CLONIDINE HYDROCHLORIDE 0.1 MG/1
TABLET ORAL
Qty: 180 TABLET | Refills: 1 | Status: SHIPPED | OUTPATIENT
Start: 2023-09-18

## 2023-09-21 ENCOUNTER — TELEPHONE (OUTPATIENT)
Dept: FAMILY MEDICINE CLINIC | Facility: CLINIC | Age: 73
End: 2023-09-21

## 2023-09-21 NOTE — TELEPHONE ENCOUNTER
Caller: Mendy Woods    Relationship: Self    Best call back number: [phone]       What was the call regarding: PATIENT WOULD LIKE TO KNOW IF REECE LEO RECOMMENDS HER TO GET THE NEW COVID, RSV, SHINGLES, FLU AND PNEUMONIA VACCINES. IF SO, CAN SHE GET THEM TOGETHER OR DOES SHE NEED TO GET THEM DONE AT SEPARATE TIMES?    PLEASE CALL AND ADVISE

## 2023-09-22 ENCOUNTER — TELEPHONE (OUTPATIENT)
Dept: FAMILY MEDICINE CLINIC | Facility: CLINIC | Age: 73
End: 2023-09-22

## 2023-09-22 ENCOUNTER — TELEPHONE (OUTPATIENT)
Dept: FAMILY MEDICINE CLINIC | Facility: CLINIC | Age: 73
End: 2023-09-22
Payer: MEDICARE

## 2023-09-22 PROBLEM — K21.9 GASTRO-ESOPHAGEAL REFLUX DISEASE WITHOUT ESOPHAGITIS: Status: ACTIVE | Noted: 2019-11-15

## 2023-09-22 PROBLEM — K14.6 GLOSSOPYROSIS: Status: ACTIVE | Noted: 2019-04-17

## 2023-09-22 PROBLEM — J01.90 ACUTE SINUSITIS: Status: ACTIVE | Noted: 2023-09-22

## 2023-09-22 PROBLEM — E11.9 DIABETES MELLITUS: Status: ACTIVE | Noted: 2023-09-22

## 2023-09-22 PROBLEM — N18.30 STAGE 3 CHRONIC KIDNEY DISEASE: Status: ACTIVE | Noted: 2021-12-13

## 2023-09-22 PROBLEM — E13.9 LATENT AUTOIMMUNE DIABETES MELLITUS IN ADULT (LADA): Status: ACTIVE | Noted: 2021-07-31

## 2023-09-22 PROBLEM — M17.0 BILATERAL PRIMARY OSTEOARTHRITIS OF KNEE: Status: ACTIVE | Noted: 2022-08-18

## 2023-09-22 PROBLEM — E78.5 HYPERLIPIDEMIA: Status: ACTIVE | Noted: 2023-09-22

## 2023-09-22 PROBLEM — J30.9 ALLERGIC RHINITIS: Status: ACTIVE | Noted: 2018-11-13

## 2023-09-22 PROBLEM — N18.4 STAGE 4 CHRONIC KIDNEY DISEASE: Status: ACTIVE | Noted: 2022-08-18

## 2023-09-22 PROBLEM — E03.9 HYPOTHYROIDISM: Status: ACTIVE | Noted: 2023-09-22

## 2023-09-22 NOTE — TELEPHONE ENCOUNTER
Spoke with patient in detail. Voiced understanding. Provider wanted this nurse to find out about endo referral. Shows no auth needed. Gave this patient the office info for Dr. Lockwood and told her to call and schedule with them. Advised if she needed anything else to let the office know.

## 2023-09-22 NOTE — TELEPHONE ENCOUNTER
Patient is wanting to know can she take Ozempic?   Also she is down to two shots a day of insulin instead of three, taking 34 units of insulin total a day.

## 2023-09-22 NOTE — TELEPHONE ENCOUNTER
Patient does not need any more pneumonia vaccines, she is up to date  Recommend other vaccines  She can take flu and covid together, do not recommend RSV with covid vaccine

## 2023-09-22 NOTE — TELEPHONE ENCOUNTER
Message went over incorrectly. Pt is wanting to know if she needs to get the new Shingles vaccine as she had the zostervac in 2017 and had a shingles out break 7-8 years ago. Please advise.

## 2023-09-22 NOTE — TELEPHONE ENCOUNTER
Caller: Mendy Woods    Relationship: Self    Best call back number: [phone]     What specialty or service is being requested: ENDOCRINOLOGY     What is the provider, practice or medical service name: [facility]     Any additional details: PATIENT CALLED OFFICE TO SCHEDULE APPOINTMENT BUT THEY SAID THEY HAVE NOT RECEIVED A REFERRAL AND WILL NOT SCHEDULE APPOINTMENT WITHOUT ONE PLEASE CALL AND ADVISE

## 2023-10-24 ENCOUNTER — OFFICE VISIT (OUTPATIENT)
Dept: FAMILY MEDICINE CLINIC | Facility: CLINIC | Age: 73
End: 2023-10-24
Payer: MEDICARE

## 2023-10-24 VITALS
BODY MASS INDEX: 42.59 KG/M2 | HEIGHT: 61 IN | HEART RATE: 67 BPM | WEIGHT: 225.6 LBS | RESPIRATION RATE: 18 BRPM | DIASTOLIC BLOOD PRESSURE: 70 MMHG | TEMPERATURE: 97.7 F | OXYGEN SATURATION: 98 % | SYSTOLIC BLOOD PRESSURE: 130 MMHG

## 2023-10-24 DIAGNOSIS — Z00.00 MEDICARE ANNUAL WELLNESS VISIT, SUBSEQUENT: Primary | ICD-10-CM

## 2023-10-24 PROCEDURE — 3078F DIAST BP <80 MM HG: CPT | Performed by: NURSE PRACTITIONER

## 2023-10-24 PROCEDURE — 3051F HG A1C>EQUAL 7.0%<8.0%: CPT | Performed by: NURSE PRACTITIONER

## 2023-10-24 PROCEDURE — 1170F FXNL STATUS ASSESSED: CPT | Performed by: NURSE PRACTITIONER

## 2023-10-24 PROCEDURE — G0439 PPPS, SUBSEQ VISIT: HCPCS | Performed by: NURSE PRACTITIONER

## 2023-10-24 PROCEDURE — 3075F SYST BP GE 130 - 139MM HG: CPT | Performed by: NURSE PRACTITIONER

## 2023-10-24 NOTE — PROGRESS NOTES
The ABCs of the Annual Wellness Visit  Subsequent Medicare Wellness Visit    Subjective      Mendy Woods is a 73 y.o. female who presents for a Subsequent Medicare Wellness Visit.    Scheduled to see Dr. Lockwood on 11/18  Suspected abscess, took advil, evaluated by dentist, started peridex   The following portions of the patient's history were reviewed and   updated as appropriate: allergies, current medications, past family history, past medical history, past social history, past surgical history, and problem list.    Compared to one year ago, the patient feels her physical   health is worse.  Feels more fragile, back and knee pain, more aches and pains, started chair exercises with good results  Blood sugar is brittle, this am without shot 82, ate a low calorie, low carb breakfast and sugar increased to 181  Compared to one year ago, the patient feels her mental   health is the same.    Recent Hospitalizations:  She was not admitted to the hospital during the last year.       Current Medical Providers:  Patient Care Team:  Felicia Duffy APRN as PCP - General (Family Medicine)    Outpatient Medications Prior to Visit   Medication Sig Dispense Refill    aspirin 81 MG EC tablet Take  by mouth.      atorvastatin (LIPITOR) 80 MG tablet Take 1 tablet by mouth Daily. 90 tablet 3    carvedilol (COREG) 25 MG tablet TAKE 1 TABLET BY MOUTH TWICE A DAY WITH MEALS 180 tablet 2    cloNIDine (CATAPRES) 0.1 MG tablet TAKE 1 TABLET BY MOUTH TWICE A  tablet 1    Cobalamin Combinations (B-12) 100-5000 MCG sublingual tablet Place  under the tongue.      Continuous Blood Gluc  (FreeStyle Hien 14 Day [place]) device 1 KIT EVERY 3 (THREE) MONTHS. 1 each 1    Continuous Blood Gluc  (FreeStyle Hien 2 [place]) device 1 EACH TAKE AS DIRECTED. 1 each 0    Continuous Blood Gluc Sensor (FreeStyle Hien 2 Sensor) misc 1 APPLICATION EVERY 14 (FOURTEEN) DAYS. 6 each 2    Diclofenac Sodium (VOLTAREN) 1 %  gel gel Apply 4 g topically to the appropriate area as directed 4 (Four) Times a Day As Needed.      Estrogens Conjugated (Premarin) 0.625 MG/GM vaginal cream APPLY SMALL AMOUNT TO AFFECTED AREA DAILY X 2 WEEKS, THEN TWICE WEEKLY 30 g 0    ferrous sulfate 324 (65 Fe) MG tablet delayed-release EC tablet Take 1 tablet by mouth 2 (Two) Times a Day With Meals.      furosemide (LASIX) 40 MG tablet Take 0.5 tablets by mouth As Needed (swelling). 03/30/22-Every other day 90 tablet 3    glucose monitor monitoring kit 1 each As Needed (Type 1 dm). 1 each 0    Insulin Lispro, 1 Unit Dial, (HumaLOG KwikPen) 100 UNIT/ML solution pen-injector 7 units 3 times a day with meals. 100 mL 3    Insulin Pen Needle (BD Pen Needle Neli U/F) 32G X 4 MM misc 1 Device 2 (two) times a day. 200 each 1    Lantus SoloStar 100 UNIT/ML injection pen INJECT 45 UNITS SUBCUTANEOUSLY DAILY 45 mL 1    Synthroid 137 MCG tablet TAKE 1 TABLET BY MOUTH EVERY DAY 90 tablet 1    Vitamin D, Cholecalciferol, (CHOLECALCIFEROL) 10 MCG (400 UNIT) tablet Take 1 tablet by mouth Daily.      vitamin E 100 UNIT capsule Take 1 capsule by mouth Daily.      losartan (COZAAR) 25 MG tablet Take 1 tablet by mouth Daily. (Patient taking differently: Take 1 tablet by mouth 2 (Two) Times a Day.) 180 tablet 3    Cranberry 50 MG chewable tablet Chew.      nystatin (MYCOSTATIN) 546214 UNIT/GM powder Apply  topically to the appropriate area as directed 4 (Four) Times a Day. 60 g 3     No facility-administered medications prior to visit.       No opioid medication identified on active medication list. I have reviewed chart for other potential  high risk medication/s and harmful drug interactions in the elderly.        Aspirin is on active medication list. Aspirin use is indicated based on review of current medical condition/s. Pros and cons of this therapy have been discussed today. Benefits of this medication outweigh potential harm.  Patient has been encouraged to continue taking  "this medication.  .      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  ACP discussion was held with the patient during this visit. Patient has an advance directive in EMR which is still valid.      Objective    Vitals:    10/24/23 0957   BP: 130/70   BP Location: Right arm   Patient Position: Sitting   Cuff Size: Adult   Pulse: 67   Resp: 18   Temp: 97.7 °F (36.5 °C)   TempSrc: Temporal   SpO2: 98%   Weight: 102 kg (225 lb 9.6 oz)   Height: 154.9 cm (60.98\")     Estimated body mass index is 42.65 kg/m² as calculated from the following:    Height as of this encounter: 154.9 cm (60.98\").    Weight as of this encounter: 102 kg (225 lb 9.6 oz).           Does the patient have evidence of cognitive impairment?   No            HEALTH RISK ASSESSMENT    Smoking Status:  Social History     Tobacco Use   Smoking Status Never   Smokeless Tobacco Never   Tobacco Comments    Don't like it     Alcohol Consumption:  Social History     Substance and Sexual Activity   Alcohol Use " Not Currently    Comment: Only very occasionally     Fall Risk Screen:    DREAD Fall Risk Assessment was completed, and patient is at LOW risk for falls.Assessment completed on:10/24/2023    Depression Screening:      10/24/2023     9:55 AM   PHQ-2/PHQ-9 Depression Screening   Little Interest or Pleasure in Doing Things 0-->not at all   Feeling Down, Depressed or Hopeless 0-->not at all   PHQ-9: Brief Depression Severity Measure Score 0       Health Habits and Functional and Cognitive Screening:      10/24/2023     9:56 AM   Functional & Cognitive Status   Do you have difficulty preparing food and eating? No   Do you have difficulty bathing yourself, getting dressed or grooming yourself? No   Do you have difficulty using the toilet? No   Do you have difficulty moving around from place to place? No   Do you have trouble with steps or getting out of a bed or a chair? No   Current Diet Well Balanced Diet   Dental Exam Up to date   Eye Exam Up to date   Exercise (times per week) 7 times per week   Current Exercises Include Home Exercise Program (TV, Computer, Etc.)   Do you need help using the phone?  No   Are you deaf or do you have serious difficulty hearing?  No   Do you need help to go to places out of walking distance? No   Do you need help shopping? No   Do you need help preparing meals?  No   Do you need help with housework?  No   Do you need help with laundry? No   Do you need help taking your medications? No   Do you need help managing money? No   Do you ever drive or ride in a car without wearing a seat belt? No   Have you felt unusual stress, anger or loneliness in the last month? No   Who do you live with? Spouse   If you need help, do you have trouble finding someone available to you? No   Have you been bothered in the last four weeks by sexual problems? No   Do you have difficulty concentrating, remembering or making decisions? No       Age-appropriate Screening Schedule:  Refer to the list below for  future screening recommendations based on patient's age, sex and/or medical conditions. Orders for these recommended tests are listed in the plan section. The patient has been provided with a written plan.    Health Maintenance   Topic Date Due    DXA SCAN  10/05/2019    LIPID PANEL  08/12/2023    DIABETIC FOOT EXAM  08/18/2023    BMI FOLLOWUP  10/25/2023 (Originally 8/18/2023)    MAMMOGRAM  01/17/2024 (Originally 9/29/2019)    ZOSTER VACCINE (2 of 3) 06/12/2024 (Originally 12/18/2017)    HEMOGLOBIN A1C  11/09/2023    COVID-19 Vaccine (6 - 2023-24 season) 11/18/2023    DIABETIC EYE EXAM  08/15/2024    ANNUAL WELLNESS VISIT  10/24/2024    COLORECTAL CANCER SCREENING  01/11/2025    TDAP/TD VACCINES (2 - Td or Tdap) 10/23/2027    INFLUENZA VACCINE  Completed    Pneumococcal Vaccine 65+  Completed    HEPATITIS C SCREENING  Discontinued    URINE MICROALBUMIN  Discontinued                  CMS Preventative Services Quick Reference  Risk Factors Identified During Encounter:    Immunizations Discussed/Encouraged: Influenza, Pneumococcal 23, Shingrix, and COVID19    The above risks/problems have been discussed with the patient.  Pertinent information has been shared with the patient in the After Visit Summary.    Diagnoses and all orders for this visit:    1. Medicare annual wellness visit, subsequent (Primary)      Information/counseling provided to the patient regarding periodic terri maintenance recommendations, including but not limited to immunizations, diet/exercise/healthy lifestyle, laboratory, and other screenings. BMI is discussed. Appropriate exercise, diet, and weight plans are discussed.      Follow Up:   Next Medicare Wellness visit to be scheduled in 1 year.      An After Visit Summary and PPPS were made available to the patient.

## 2023-10-31 ENCOUNTER — TRANSCRIBE ORDERS (OUTPATIENT)
Dept: ADMINISTRATIVE | Facility: HOSPITAL | Age: 73
End: 2023-10-31
Payer: MEDICARE

## 2023-10-31 ENCOUNTER — LAB (OUTPATIENT)
Dept: LAB | Facility: HOSPITAL | Age: 73
End: 2023-10-31
Payer: MEDICARE

## 2023-10-31 DIAGNOSIS — E87.5 HYPERPOTASSEMIA: ICD-10-CM

## 2023-10-31 DIAGNOSIS — N18.4 CHRONIC KIDNEY DISEASE, STAGE IV (SEVERE): ICD-10-CM

## 2023-10-31 DIAGNOSIS — E55.9 AVITAMINOSIS D: ICD-10-CM

## 2023-10-31 DIAGNOSIS — E21.1 SECONDARY HYPERPARATHYROIDISM, NON-RENAL: ICD-10-CM

## 2023-10-31 DIAGNOSIS — N18.4 CHRONIC KIDNEY DISEASE, STAGE IV (SEVERE): Primary | ICD-10-CM

## 2023-10-31 LAB
ALBUMIN SERPL-MCNC: 3.8 G/DL (ref 3.5–5.2)
ANION GAP SERPL CALCULATED.3IONS-SCNC: 7.7 MMOL/L (ref 5–15)
BACTERIA UR QL AUTO: ABNORMAL /HPF
BILIRUB UR QL STRIP: NEGATIVE
BUN SERPL-MCNC: 24 MG/DL (ref 8–23)
BUN/CREAT SERPL: 8.9 (ref 7–25)
CALCIUM SPEC-SCNC: 9.2 MG/DL (ref 8.6–10.5)
CHLORIDE SERPL-SCNC: 108 MMOL/L (ref 98–107)
CLARITY UR: ABNORMAL
CO2 SERPL-SCNC: 23.3 MMOL/L (ref 22–29)
COLOR UR: YELLOW
CREAT SERPL-MCNC: 2.7 MG/DL (ref 0.57–1)
CREAT UR-MCNC: 160.4 MG/DL
DEPRECATED RDW RBC AUTO: 44.5 FL (ref 37–54)
EGFRCR SERPLBLD CKD-EPI 2021: 18 ML/MIN/1.73
ERYTHROCYTE [DISTWIDTH] IN BLOOD BY AUTOMATED COUNT: 13.5 % (ref 12.3–15.4)
GLUCOSE SERPL-MCNC: 137 MG/DL (ref 65–99)
GLUCOSE UR STRIP-MCNC: NEGATIVE MG/DL
HCT VFR BLD AUTO: 36.5 % (ref 34–46.6)
HGB BLD-MCNC: 11.6 G/DL (ref 12–15.9)
HGB UR QL STRIP.AUTO: ABNORMAL
HYALINE CASTS UR QL AUTO: ABNORMAL /LPF
KETONES UR QL STRIP: NEGATIVE
LEUKOCYTE ESTERASE UR QL STRIP.AUTO: ABNORMAL
MCH RBC QN AUTO: 28.6 PG (ref 26.6–33)
MCHC RBC AUTO-ENTMCNC: 31.8 G/DL (ref 31.5–35.7)
MCV RBC AUTO: 90.1 FL (ref 79–97)
NITRITE UR QL STRIP: POSITIVE
PH UR STRIP.AUTO: 5.5 [PH] (ref 5–8)
PHOSPHATE SERPL-MCNC: 3.8 MG/DL (ref 2.5–4.5)
PLATELET # BLD AUTO: 178 10*3/MM3 (ref 140–450)
PMV BLD AUTO: 8.9 FL (ref 6–12)
POTASSIUM SERPL-SCNC: 5.3 MMOL/L (ref 3.5–5.2)
PROT ?TM UR-MCNC: 58.8 MG/DL
PROT UR QL STRIP: ABNORMAL
PROT/CREAT UR: 366.6 MG/G CREA (ref 0–200)
RBC # BLD AUTO: 4.05 10*6/MM3 (ref 3.77–5.28)
RBC # UR STRIP: ABNORMAL /HPF
REF LAB TEST METHOD: ABNORMAL
SODIUM SERPL-SCNC: 139 MMOL/L (ref 136–145)
SP GR UR STRIP: 1.02 (ref 1–1.03)
SQUAMOUS #/AREA URNS HPF: ABNORMAL /HPF
UROBILINOGEN UR QL STRIP: ABNORMAL
WBC # UR STRIP: ABNORMAL /HPF
WBC NRBC COR # BLD: 7.02 10*3/MM3 (ref 3.4–10.8)

## 2023-10-31 PROCEDURE — 80069 RENAL FUNCTION PANEL: CPT

## 2023-10-31 PROCEDURE — 82570 ASSAY OF URINE CREATININE: CPT

## 2023-10-31 PROCEDURE — 36415 COLL VENOUS BLD VENIPUNCTURE: CPT

## 2023-10-31 PROCEDURE — 81001 URINALYSIS AUTO W/SCOPE: CPT

## 2023-10-31 PROCEDURE — 85027 COMPLETE CBC AUTOMATED: CPT

## 2023-10-31 PROCEDURE — 84156 ASSAY OF PROTEIN URINE: CPT

## 2023-11-29 ENCOUNTER — TELEPHONE (OUTPATIENT)
Dept: FAMILY MEDICINE CLINIC | Facility: CLINIC | Age: 73
End: 2023-11-29
Payer: MEDICARE

## 2023-11-29 ENCOUNTER — TELEPHONE (OUTPATIENT)
Dept: FAMILY MEDICINE CLINIC | Facility: CLINIC | Age: 73
End: 2023-11-29

## 2023-11-29 RX ORDER — CARVEDILOL 25 MG/1
25 TABLET ORAL 2 TIMES DAILY WITH MEALS
Qty: 180 TABLET | Refills: 2 | Status: SHIPPED | OUTPATIENT
Start: 2023-11-29

## 2023-11-29 NOTE — TELEPHONE ENCOUNTER
Caller: Mendy Woods     Relationship: SELF      Best call back number: [phone]     What is your medical concern?     PATIENT WENT TO THE URGENT TREATMENT ON 11/25/2023 AND WAS DIAGNOSED AS HAVING A UTI.  THEY GAVE HER CIPROFLOXACIN TO TAKE FOR THE INFECTION.    PATIENT WANTS TO KNOW DOES SHE NEED TO COME IN AT THE END OF THE WEEK TO HAVE HER URINE RECHECKED TO SEE IF THE INFECTION HAS CLEARED UP OR DOES SHE NEED TO COME IN AND BE SEEN.    PLEASE ADVISE

## 2023-11-29 NOTE — TELEPHONE ENCOUNTER
Caller: Mendy Woods    Relationship: Self    Best call back number: [phone]     Requested Prescriptions:   Requested Prescriptions     Pending Prescriptions Disp Refills    carvedilol (COREG) 25 MG tablet 180 tablet 2     Sig: Take 1 tablet by mouth 2 (Two) Times a Day With Meals.        Pharmacy where request should be sent: [facility] [id number] [place] [facility] [address] [facility] [address] - [phone] [facility] [phone] FX     Last office visit with prescribing clinician: 10/24/2023   Last telemedicine visit with prescribing clinician: Visit date not found   Next office visit with prescribing clinician: 10/29/2024     Additional details provided by patient:     PATIENT WILL BE OUT OF MEDICATION AT THE END OF THE WEEK.  SHE THINKS THE PHARMACY HAS SHORTED HER ON HER LAST REFILL AND SHE DOES NOT HAVE ENOUGH TO HAVE A 90 DAY SUPPLY.      Does the patient have less than a 3 day supply:  [x] Yes  [] No    Would you like a call back once the refill request has been completed: [x] Yes [] No    If the office needs to give you a call back, can they leave a voicemail: [] Yes [] No    Chiki Berman Rep   11/29/23 12:39 EST

## 2023-12-23 DIAGNOSIS — E10.22 TYPE 1 DIABETES MELLITUS WITH STAGE 4 CHRONIC KIDNEY DISEASE: ICD-10-CM

## 2023-12-23 DIAGNOSIS — N18.4 TYPE 1 DIABETES MELLITUS WITH STAGE 4 CHRONIC KIDNEY DISEASE: ICD-10-CM

## 2023-12-26 NOTE — TELEPHONE ENCOUNTER
Rx Refill Note  Requested Prescriptions     Pending Prescriptions Disp Refills    Continuous Blood Gluc Sensor (FreeStyle Hien 2 Sensor) misc [Pharmacy Med Name: FREESTYLE HIEN 2 SENSOR]  1     Si APPLICATION EVERY 14 (FOURTEEN) DAYS.      Last office visit with prescribing clinician: 10/24/2023   Last telemedicine visit with prescribing clinician: Visit date not found   Next office visit with prescribing clinician: 10/29/2024                         Would you like a call back once the refill request has been completed: [] Yes [] No    If the office needs to give you a call back, can they leave a voicemail: [] Yes [] No    Chiki Doll Rep  23, 13:53 EST

## 2024-01-02 RX ORDER — FUROSEMIDE 40 MG/1
20 TABLET ORAL AS NEEDED
Qty: 90 TABLET | Refills: 1 | Status: SHIPPED | OUTPATIENT
Start: 2024-01-02

## 2024-01-30 ENCOUNTER — OFFICE VISIT (OUTPATIENT)
Dept: CARDIOLOGY | Facility: CLINIC | Age: 74
End: 2024-01-30
Payer: MEDICARE

## 2024-01-30 VITALS
BODY MASS INDEX: 30.75 KG/M2 | HEIGHT: 72 IN | OXYGEN SATURATION: 96 % | HEART RATE: 73 BPM | DIASTOLIC BLOOD PRESSURE: 64 MMHG | WEIGHT: 227 LBS | SYSTOLIC BLOOD PRESSURE: 120 MMHG

## 2024-01-30 DIAGNOSIS — N18.30 BENIGN HYPERTENSION WITH CKD (CHRONIC KIDNEY DISEASE) STAGE III: Primary | ICD-10-CM

## 2024-01-30 DIAGNOSIS — E78.00 PURE HYPERCHOLESTEROLEMIA: ICD-10-CM

## 2024-01-30 DIAGNOSIS — I12.9 BENIGN HYPERTENSION WITH CKD (CHRONIC KIDNEY DISEASE) STAGE III: Primary | ICD-10-CM

## 2024-01-30 DIAGNOSIS — I10 ESSENTIAL HYPERTENSION: ICD-10-CM

## 2024-01-30 DIAGNOSIS — E78.5 HYPERLIPIDEMIA, UNSPECIFIED HYPERLIPIDEMIA TYPE: ICD-10-CM

## 2024-01-30 PROCEDURE — 3078F DIAST BP <80 MM HG: CPT | Performed by: NURSE PRACTITIONER

## 2024-01-30 PROCEDURE — 93000 ELECTROCARDIOGRAM COMPLETE: CPT | Performed by: NURSE PRACTITIONER

## 2024-01-30 PROCEDURE — 1160F RVW MEDS BY RX/DR IN RCRD: CPT | Performed by: NURSE PRACTITIONER

## 2024-01-30 PROCEDURE — 99214 OFFICE O/P EST MOD 30 MIN: CPT | Performed by: NURSE PRACTITIONER

## 2024-01-30 PROCEDURE — 1159F MED LIST DOCD IN RCRD: CPT | Performed by: NURSE PRACTITIONER

## 2024-01-30 PROCEDURE — 3074F SYST BP LT 130 MM HG: CPT | Performed by: NURSE PRACTITIONER

## 2024-01-30 RX ORDER — FUROSEMIDE 40 MG/1
40 TABLET ORAL AS NEEDED
Start: 2024-01-30

## 2024-01-30 NOTE — PROGRESS NOTES
[facility] Follow Up Office Note     Encounter Date:24  Patient:Mendy Woods  :1950  MRN:[id number]      Chief Complaint:   Chief Complaint   Patient presents with    Follow-up         History of Presenting Illness:        Mendy Woods is a 73 y.o. female who is here for follow-up. She is a patient of Dr Fuchs.    Patient has past medical history that is significant for hypertension, mixed hyperlipidemia, morbid obesity, DM2 on insulin therapy, chronic kidney disease stage IIIa.    Patient was seen by Dr. Fuchs in 2023 at which time she was stable from a cardiac perspective and no changes were recommended.      Today patient reports that she feels her blood pressure is typically controlled.  She is not having chest pain, dyspnea on exertion, PND orthopnea.  She has intermittent lower extremity for which she takes as needed Lasix with appropriate response      Review of Systems:  Review of Systems   Cardiovascular:  Positive for leg swelling. Negative for chest pain, dyspnea on exertion, orthopnea and palpitations.   Respiratory:  Negative for shortness of breath.        Current Outpatient Medications on File Prior to Visit   Medication Sig Dispense Refill    aspirin 81 MG EC tablet Take  by mouth.      atorvastatin (LIPITOR) 80 MG tablet Take 1 tablet by mouth Daily. 90 tablet 3    carvedilol (COREG) 25 MG tablet Take 1 tablet by mouth 2 (Two) Times a Day With Meals. 180 tablet 2    cetirizine (zyrTEC) 5 MG tablet Take 1 tablet by mouth Daily.      cloNIDine (CATAPRES) 0.1 MG tablet TAKE 1 TABLET BY MOUTH TWICE A  tablet 1    Cobalamin Combinations (B-12) 100-5000 MCG sublingual tablet Place  under the tongue.      Continuous Blood Gluc  (FreeStyle Hien 2 [place]) device 1 EACH TAKE AS DIRECTED. 1 each 0    Continuous Blood Gluc Sensor (FreeStyle Hien 2 Sensor) misc 1 APPLICATION EVERY 14 (FOURTEEN) DAYS. 6 each 1    Diclofenac Sodium (VOLTAREN) 1 % gel  gel Apply 4 g topically to the appropriate area as directed 4 (Four) Times a Day As Needed.      Estrogens Conjugated (Premarin) 0.625 MG/GM vaginal cream APPLY SMALL AMOUNT TO AFFECTED AREA DAILY X 2 WEEKS, THEN TWICE WEEKLY 30 g 0    ferrous sulfate 324 (65 Fe) MG tablet delayed-release EC tablet Take 1 tablet by mouth 2 (Two) Times a Day With Meals.      glucose monitor monitoring kit 1 each As Needed (Type 1 dm). 1 each 0    Insulin Lispro, 1 Unit Dial, (HumaLOG KwikPen) 100 UNIT/ML solution pen-injector 7 units 3 times a day with meals. 100 mL 3    Insulin Pen Needle (BD Pen Needle Neli U/F) 32G X 4 MM misc 1 Device 2 (two) times a day. 200 each 1    Lantus SoloStar 100 UNIT/ML injection pen INJECT 45 UNITS SUBCUTANEOUSLY DAILY 45 mL 1    saccharomyces boulardii (FLORASTOR) 250 MG capsule Take 1 capsule by mouth 2 (Two) Times a Day.      Synthroid 137 MCG tablet TAKE 1 TABLET BY MOUTH EVERY DAY 90 tablet 1    Vitamin D, Cholecalciferol, (CHOLECALCIFEROL) 10 MCG (400 UNIT) tablet Take 1 tablet by mouth Daily.      vitamin E 100 UNIT capsule Take 1 capsule by mouth Daily.      [DISCONTINUED] furosemide (LASIX) 40 MG tablet TAKE 0.5 TABLETS BY MOUTH AS NEEDED (SWELLING). 03/30/22-EVERY OTHER DAY 90 tablet 1    losartan (COZAAR) 25 MG tablet Take 1 tablet by mouth Daily. (Patient taking differently: Take 1 tablet by mouth 2 (Two) Times a Day.) 180 tablet 3    [DISCONTINUED] Continuous Blood Gluc  (FreeStyle Hien 14 Day [place]) device 1 KIT EVERY 3 (THREE) MONTHS. 1 each 1    [DISCONTINUED] levocetirizine (XYZAL) 5 MG tablet Take 5 mg by mouth As Needed.       No current facility-administered medications on file prior to visit.       Allergies   Allergen Reactions    Amoxicillin Diarrhea    Augmentin [Amoxicillin-Pot Clavulanate] Diarrhea    Sulfa Antibiotics Other (See Comments)     Facial swelling    Sulfamethazine Swelling       Past Medical History:   Diagnosis Date    Abnormal blood chemistry test      Allergic Sulpha    @ treated for urinary tract infection - and treated with    Allergic rhinitis     Anemia, B12 deficiency     Arthritis     Cataract age 67    Removed by laser surgury here in [place]    Cutaneous lupus erythematosus     Dermatitis     Disorder of kidney and ureter     Edema of optic nerve     Herpes zoster     HL (hearing loss)     Tested but waited till Covid cleared    Hyperlipidemia     Hypertension     Hypothyroidism Spring 1968    Laryngitis     Low back pain     Obesity     Come on!!!    Pernicious anemia     Pneumonia @    had 3 Pneumonia shots as vaccines available    Renal failure     Renal insufficiency     Sarcoidosis     Skin rash     Tonsillitis     Type 1 diabetes mellitus     Type 2 diabetes mellitus, controlled     Urinary tract infection yep    Visual impairment I wear reading classes    Vitamin B deficiency        Past Surgical History:   Procedure Laterality Date    BREAST SURGERY      removed small benign mass right breast    CARDIAC SURGERY      COLONOSCOPY      Normal.    EYE SURGERY  removed cataract    FRACTURE SURGERY      GASTROSTOMY      HERNIA REPAIR      HYSTERECTOMY      INGUINAL HERNIA REPAIR      JOINT REPLACEMENT      LYMPH NODE BIOPSY      sarcoid       Social History     Socioeconomic History    Marital status:    Tobacco Use    Smoking status: Never    Smokeless tobacco: Never    Tobacco comments:     Don't like it   Vaping Use    Vaping Use: Never used   Substance and Sexual Activity    Alcohol use: Not Currently     Comment: Only very occasionally    Drug use: Never    Sexual activity: Yes     Partners: Male     Birth control/protection: Post-menopausal, None     Comment: My 71-year old  is still sexy! ...and a good lover!       Family History   Problem Relation Age of Onset    Thyroid disease Mother         Hypothyroid    Diabetes Father           from diabetes complications    Heart attack Father     Hashimoto's  "thyroiditis Sister     Hashimoto's thyroiditis Daughter     Diabetes Paternal Grandmother     Cancer Paternal Grandmother     Hashimoto's thyroiditis Sister     Hashimoto's thyroiditis Daughter     Cancer Paternal Grandfather         Cigar smoker/ Cancer of the mouth    Diabetes Paternal Grandfather        The following portions of the patient's history were reviewed and updated as appropriate: allergies, current medications, past family history, past medical history, past social history, past surgical history and problem list.       Objective:       Vitals:    01/30/24 1434   BP: 120/64   Pulse: 73   SpO2: 96%   Weight: 103 kg (227 lb)   Height: 185.4 cm (73\")         Physical Exam:  Constitutional: Well appearing, well developed, no acute distress   HENT: Oropharynx clear and membrane moist  Eyes: Normal conjunctiva, no sclera icterus  Neck: Supple, no carotid bruit bilaterally  Cardiovascular: Regular rate and rhythm, No Murmur, No bilateral lower extremity edema  Pulmonary: Normal respiratory effort, normal lung sounds, no wheezing  Neurological: Alert and orient x 3  Skin: Warm, dry, no ecchymosis, no rash  Psych: Appropriate mood and affect. Normal judgment and insight         Lab Results   Component Value Date     10/31/2023     08/01/2023    K 5.3 (H) 10/31/2023    K 5.5 (H) 08/01/2023     (H) 10/31/2023     08/01/2023    CO2 23.3 10/31/2023    CO2 23.3 08/01/2023    BUN 24 (H) 10/31/2023    BUN 36 (H) 08/01/2023    CREATININE 2.70 (H) 10/31/2023    CREATININE 2.46 (H) 08/01/2023    EGFRIFNONA 20 (L) 02/18/2022    EGFRIFNONA 26 (L) 01/25/2022    EGFRIFAFRI 23 (L) 02/18/2022    EGFRIFAFRI 30 (L) 11/18/2021    GLUCOSE 137 (H) 10/31/2023    GLUCOSE 260 (H) 08/01/2023    CALCIUM 9.2 10/31/2023    CALCIUM 9.4 08/01/2023    CALCIUM 9.1 08/01/2023    PROTENTOTREF 7.0 05/09/2023    PROTENTOTREF 7.4 02/18/2022    ALBUMIN 3.8 10/31/2023    ALBUMIN 3.8 05/09/2023    BILITOT 0.4 05/09/2023    " BILITOT 0.4 02/18/2022    AST 13 05/09/2023    AST 16 02/18/2022    ALT 9 05/09/2023    ALT 11 02/18/2022     Lab Results   Component Value Date    WBC 7.02 10/31/2023    WBC 9.07 08/01/2023    HGB 11.6 (L) 10/31/2023    HGB 11.9 (L) 08/01/2023    HCT 36.5 10/31/2023    HCT 37.2 08/01/2023    MCV 90.1 10/31/2023    MCV 90.1 08/01/2023     10/31/2023     08/01/2023     Lab Results   Component Value Date    CHOL 200 08/12/2022    CHOL 148 10/23/2020    TRIG 158 (H) 08/12/2022    TRIG 145 07/21/2021    HDL 42 08/12/2022    HDL 47 07/21/2021     (H) 08/12/2022    LDL 94 07/21/2021     Lab Results   Component Value Date    PROBNP 1,444.0 (H) 01/10/2022    PROBNP 2,004 (H) 12/06/2021     Lab Results   Component Value Date    TROPONINT 0.013 01/10/2022     Lab Results   Component Value Date    TSH 0.678 08/12/2022    TSH 0.918 01/10/2022           ECG 12 Lead    Date/Time: 1/30/2024 2:40 PM  Performed by: Keerthi Diaz APRN    Authorized by: Keerthi Diaz APRN  Comparison: compared with previous ECG from 7/25/2023  Similar to previous ECG  Rhythm: sinus rhythm  Rate: normal  BPM: 65  ST Segments: ST segments normal  QRS axis: normal             Assessment:          Diagnosis Plan   1. Benign hypertension with CKD (chronic kidney disease) stage III  ECG 12 Lead      2. Essential hypertension        3. Hyperlipidemia, unspecified hyperlipidemia type  Lipid Panel      4. Pure hypercholesterolemia               Plan:       Hypertension/chronic kidney disease stage IV -blood pressure is controlled on current regimen.  I am not recommending any changes  Mixed hyperlipidemia -continue high potency statin.  Needs repeat lipid panel which I have ordered    Patient is being seen today for follow-up.  I think she is stable from a cardiac perspective and I am not recommending any changes.  Will repeat a lipid panel.  Patient prefers to be seen at [facility].  Since Dr. Fuchs does not travel to  that office will recommend follow-up with a different provider in 6 months, earlier with problems      Orders Placed This Encounter   Procedures    Lipid Panel     Standing Status:   Future     Standing Expiration Date:   1/30/2025     Order Specific Question:   Release to patient     Answer:   Routine Release [[id number]]    ECG 12 Lead     This order was created via procedure documentation     Order Specific Question:   Release to patient     Answer:   Routine Release [[id number]]            BRIANNA Neely  [facility]  01/30/24  15:35 EST

## 2024-02-08 RX ORDER — CARVEDILOL 25 MG/1
25 TABLET ORAL 2 TIMES DAILY WITH MEALS
Qty: 180 TABLET | Refills: 2 | Status: SHIPPED | OUTPATIENT
Start: 2024-02-08

## 2024-02-08 NOTE — TELEPHONE ENCOUNTER
Caller: Mendy Woods    Relationship: Self    Best call back number: [phone]     Requested Prescriptions:   Requested Prescriptions     Pending Prescriptions Disp Refills    carvedilol (COREG) 25 MG tablet 180 tablet 2     Sig: Take 1 tablet by mouth 2 (Two) Times a Day With Meals.        Pharmacy where request should be sent: [facility]Y [id number] [address] [facility]. AT [facility] [phone] [facility] [phone]      Last office visit with prescribing clinician: 10/24/2023   Last telemedicine visit with prescribing clinician: Visit date not found   Next office visit with prescribing clinician: 10/29/2024     Additional details provided by patient: TOOK AM DOSE THIS MORNING, NO OTHER DOSING LEFT     Does the patient have less than a 3 day supply:  [x] Yes  [] No    Would you like a call back once the refill request has been completed: [] Yes [] No    If the office needs to give you a call back, can they leave a voicemail: [] Yes [] No    Chiki Bermeo Rep   02/08/24 09:09 EST

## 2024-03-11 NOTE — TELEPHONE ENCOUNTER
Rx Refill Note  Requested Prescriptions     Pending Prescriptions Disp Refills    Synthroid 137 MCG tablet [Pharmacy Med Name: SYNTHROID 137 MCG TABLET] 90 tablet 1     Sig: TAKE 1 TABLET BY MOUTH EVERY DAY      Last office visit with prescribing clinician: 10/24/2023   Last telemedicine visit with prescribing clinician: Visit date not found   Next office visit with prescribing clinician: 10/29/2024                         Would you like a call back once the refill request has been completed: [] Yes [] No    If the office needs to give you a call back, can they leave a voicemail: [] Yes [] No    Mat Poole MA  03/11/24, 08:15 EDT

## 2024-03-12 RX ORDER — LEVOTHYROXINE SODIUM 137 MCG
TABLET ORAL
Qty: 90 TABLET | Refills: 1 | Status: SHIPPED | OUTPATIENT
Start: 2024-03-12

## 2024-03-24 NOTE — PROGRESS NOTES
"Chief Complaint  Ingrown Toenail    Subjective        Mendy Woods presents to [facility]  History of Present Illness  Pleasant patient complains of left great toe tenderness laterally lateral nail fold last couple days she removed a portion of the nail no purulence, but more tenderness  Not more redness  No discharge  History of thickened toes seems to be getting thicker over-the-counter antifungal helps control it but never goes away  She has no contraindications to Lamisil no history of liver difficulties she has chronic renal efficiency presently stable and diabetes.    Ingrown Toenail        Objective   Vital Signs:  /68   Pulse 61   Temp 97 °F (36.1 °C) (Infrared)   Resp 12   Ht 154.9 cm (60.98\")   Wt 104 kg (230 lb)   SpO2 99%   BMI 43.49 kg/m²   Estimated body mass index is 43.49 kg/m² as calculated from the following:    Height as of this encounter: 154.9 cm (60.98\").    Weight as of this encounter: 104 kg (230 lb).          Physical Exam  Constitutional:       General: She is not in acute distress.     Appearance: Normal appearance. She is not ill-appearing, toxic-appearing or diaphoretic.      Comments: Eran appears well   Eyes:      Conjunctiva/sclera: Conjunctivae normal.      Pupils: Pupils are equal, round, and reactive to light.   Pulmonary:      Effort: Pulmonary effort is normal. No respiratory distress.   Musculoskeletal:      Comments: Left great toe minimal swelling around the lateral nail fold tenderness trace of redness no purulence nail thickened both great toes thickened nails and at least 1 through 5 on the right  Discolored yellowish slight green  Consistent with a suspected fungal infection   Skin:     General: Skin is warm and dry.   Neurological:      General: No focal deficit present.      Mental Status: She is alert and oriented to person, place, and time.   Psychiatric:         Mood and Affect: Mood normal.         Behavior: " Pt N/V/D since this am. Mmm. Abd pain.    Behavior normal.         Thought Content: Thought content normal.         Judgment: Judgment normal.        Result Review :                Assessment and Plan   Diagnoses and all orders for this visit:    1. Nail problem (Primary)  -     Ambulatory Referral to Podiatry    2. Type 1 diabetes mellitus with nephropathy  -     Ambulatory Referral to Podiatry    3. Ingrown left big toenail  Comments:  Possible early infection no abscess    4. Nail fungus    Other orders  -     terbinafine (LamISIL) 250 MG tablet; Take 1 tablet by mouth Daily.  Dispense: 90 tablet; Refill: 0  -     amoxicillin-clavulanate (Augmentin) 875-125 MG per tablet; Take 1 tablet by mouth 2 (Two) Times a Day.  Dispense: 14 tablet; Refill: 0             Follow Up   No follow-ups on file.  Patient was given instructions and counseling regarding her condition or for health maintenance advice. Please see specific information pulled into the AVS if appropriate.     Patient Instructions   Discharge instructions  Augmentin twice a day for the infection   Soak your foot twice a day for the next couple weeks elevate when sitting if increased pain redness swelling fever emergency room      Lab you can start after you finish the Augmentin take this for 3 months should you turn yellow tea colored urine's abdominal pain fatigue weakness emergency room  Stop the medication and seek immediate treatment this is rare follow-up podiatry for good foot care and nail trimming.       Patient likely has an early nail infection and has ingrown nail without abscess  And likely secondary fungal treatment discussed  Specimen collection 6-week fungal test versus empiric treatment she prefers treatment risk-benefit discussed rare risk of liver disease  If taking antifungal should she have changes as above she is to immediately stop and seek urgent treatment  Augmentin for early infection complication of ingrown nail  Podiatry referral  Increased pain redness fever chills  emergency room  Take Lamisil total 3 months then DC      Answers for HPI/ROS submitted by the patient on 4/24/2023  Please describe your symptoms.: Sore big toe...because of diabetes, I have concerns about trying to deal with what I perceive as an ingrown toenail.  Have you had these symptoms before?: Yes  How long have you been having these symptoms?: 1-4 days  Please list any medications you are currently taking for this condition.: Neosporin, Icthmal black salve.  Please describe any probable cause for these symptoms. : Ingrown toenail  What is the primary reason for your visit?: Other

## 2024-04-25 ENCOUNTER — TELEPHONE (OUTPATIENT)
Dept: FAMILY MEDICINE CLINIC | Facility: CLINIC | Age: 74
End: 2024-04-25

## 2024-04-25 NOTE — TELEPHONE ENCOUNTER
Caller: Mendy Woods    Relationship: Self    Best call back number: [phone]    What was the call regarding: PATIENT WOULD LIKE TO KNOW WHAT REECE LEO ADVISES FOR HER TO TAKE TO HELP WITH CONGESTION AND MUCUS.    PLEASE CALL TO ADVISE.

## 2024-05-15 ENCOUNTER — TELEPHONE (OUTPATIENT)
Dept: FAMILY MEDICINE CLINIC | Facility: CLINIC | Age: 74
End: 2024-05-15

## 2024-05-15 NOTE — TELEPHONE ENCOUNTER
PATIENT CALLED AND STATES HER MEDICATION   losartan (COZAAR) 25 MG tablet  IS TO BE FILLED FOR 2 TABLETS A DAY. SHE REQUESTED A 90 DAY SUPPLY. SHE RECEIVED 90 TABLETS.     WHICH WILL BE GOOD FOR 45 DAYS.           [facility]/pharmacy #[id number] - [place], [place] - [address] AT [facility] - [phone]  - [phone]  [phone]     CALL BACK NUMBER [phone]

## 2024-05-15 NOTE — TELEPHONE ENCOUNTER
Looks like pts Kidney doctor was the one who recently refilled medication. She will need to call them and have it corrected

## 2024-06-03 RX ORDER — CLONIDINE HYDROCHLORIDE 0.1 MG/1
TABLET ORAL
Qty: 180 TABLET | Refills: 1 | Status: SHIPPED | OUTPATIENT
Start: 2024-06-03

## 2024-07-09 ENCOUNTER — LAB (OUTPATIENT)
Dept: LAB | Facility: HOSPITAL | Age: 74
End: 2024-07-09
Payer: MEDICARE

## 2024-07-09 ENCOUNTER — TRANSCRIBE ORDERS (OUTPATIENT)
Dept: ADMINISTRATIVE | Facility: HOSPITAL | Age: 74
End: 2024-07-09
Payer: MEDICARE

## 2024-07-09 DIAGNOSIS — E55.9 VITAMIN D DEFICIENCY: ICD-10-CM

## 2024-07-09 DIAGNOSIS — R80.9 PROTEINURIA, UNSPECIFIED TYPE: ICD-10-CM

## 2024-07-09 DIAGNOSIS — E21.1 SECONDARY HYPERPARATHYROIDISM, NON-RENAL: ICD-10-CM

## 2024-07-09 DIAGNOSIS — N18.4 CHRONIC KIDNEY DISEASE, STAGE IV (SEVERE): ICD-10-CM

## 2024-07-09 DIAGNOSIS — N18.4 CHRONIC KIDNEY DISEASE, STAGE IV (SEVERE): Primary | ICD-10-CM

## 2024-07-09 LAB
25(OH)D3 SERPL-MCNC: 31.5 NG/ML (ref 30–100)
ALBUMIN SERPL-MCNC: 3.8 G/DL (ref 3.5–5.2)
ANION GAP SERPL CALCULATED.3IONS-SCNC: 9.5 MMOL/L (ref 5–15)
BACTERIA UR QL AUTO: ABNORMAL /HPF
BILIRUB UR QL STRIP: NEGATIVE
BUN SERPL-MCNC: 58 MG/DL (ref 8–23)
BUN/CREAT SERPL: 20.5 (ref 7–25)
CALCIUM SPEC-SCNC: 9.3 MG/DL (ref 8.6–10.5)
CHLORIDE SERPL-SCNC: 104 MMOL/L (ref 98–107)
CLARITY UR: CLEAR
CO2 SERPL-SCNC: 25.5 MMOL/L (ref 22–29)
COLOR UR: YELLOW
CREAT SERPL-MCNC: 2.83 MG/DL (ref 0.57–1)
CREAT UR-MCNC: 50.4 MG/DL
DEPRECATED RDW RBC AUTO: 44.2 FL (ref 37–54)
EGFRCR SERPLBLD CKD-EPI 2021: 16.9 ML/MIN/1.73
ERYTHROCYTE [DISTWIDTH] IN BLOOD BY AUTOMATED COUNT: 13.5 % (ref 12.3–15.4)
GLUCOSE SERPL-MCNC: 207 MG/DL (ref 65–99)
GLUCOSE UR STRIP-MCNC: NEGATIVE MG/DL
HCT VFR BLD AUTO: 36.7 % (ref 34–46.6)
HGB BLD-MCNC: 11.6 G/DL (ref 12–15.9)
HGB UR QL STRIP.AUTO: ABNORMAL
HYALINE CASTS UR QL AUTO: ABNORMAL /LPF
KETONES UR QL STRIP: NEGATIVE
LEUKOCYTE ESTERASE UR QL STRIP.AUTO: ABNORMAL
MAGNESIUM SERPL-MCNC: 1.8 MG/DL (ref 1.6–2.4)
MCH RBC QN AUTO: 28.2 PG (ref 26.6–33)
MCHC RBC AUTO-ENTMCNC: 31.6 G/DL (ref 31.5–35.7)
MCV RBC AUTO: 89.1 FL (ref 79–97)
NITRITE UR QL STRIP: NEGATIVE
PH UR STRIP.AUTO: 5.5 [PH] (ref 5–8)
PHOSPHATE SERPL-MCNC: 3.4 MG/DL (ref 2.5–4.5)
PLATELET # BLD AUTO: 195 10*3/MM3 (ref 140–450)
PMV BLD AUTO: 9.2 FL (ref 6–12)
POTASSIUM SERPL-SCNC: 4.8 MMOL/L (ref 3.5–5.2)
PROT ?TM UR-MCNC: 14.9 MG/DL
PROT UR QL STRIP: NEGATIVE
PROT/CREAT UR: 295.6 MG/G CREA (ref 0–200)
PTH-INTACT SERPL-MCNC: 138 PG/ML (ref 15–65)
RBC # BLD AUTO: 4.12 10*6/MM3 (ref 3.77–5.28)
RBC # UR STRIP: ABNORMAL /HPF
REF LAB TEST METHOD: ABNORMAL
SODIUM SERPL-SCNC: 139 MMOL/L (ref 136–145)
SP GR UR STRIP: 1.01 (ref 1–1.03)
SQUAMOUS #/AREA URNS HPF: ABNORMAL /HPF
UROBILINOGEN UR QL STRIP: ABNORMAL
WBC # UR STRIP: ABNORMAL /HPF
WBC NRBC COR # BLD AUTO: 8.6 10*3/MM3 (ref 3.4–10.8)

## 2024-07-09 PROCEDURE — 81001 URINALYSIS AUTO W/SCOPE: CPT

## 2024-07-09 PROCEDURE — 84156 ASSAY OF PROTEIN URINE: CPT

## 2024-07-09 PROCEDURE — 80069 RENAL FUNCTION PANEL: CPT

## 2024-07-09 PROCEDURE — 85027 COMPLETE CBC AUTOMATED: CPT

## 2024-07-09 PROCEDURE — 36415 COLL VENOUS BLD VENIPUNCTURE: CPT

## 2024-07-09 PROCEDURE — 82570 ASSAY OF URINE CREATININE: CPT

## 2024-07-09 PROCEDURE — 82306 VITAMIN D 25 HYDROXY: CPT

## 2024-07-09 PROCEDURE — 83970 ASSAY OF PARATHORMONE: CPT

## 2024-07-09 PROCEDURE — 83735 ASSAY OF MAGNESIUM: CPT

## 2024-07-31 ENCOUNTER — TELEPHONE (OUTPATIENT)
Dept: FAMILY MEDICINE CLINIC | Facility: CLINIC | Age: 74
End: 2024-07-31
Payer: MEDICARE

## 2024-07-31 NOTE — TELEPHONE ENCOUNTER
Caller: Mendy Woods    Relationship: Self    Best call back number: [phone]     What is the best time to reach you: ANY    Who are you requesting to speak with (clinical staff, provider,  specific staff member): REECE LEO      What was the call regarding: PATIENT WAS AT HER KIDNEY DOCTOR AND THEY PUT PATIENT ON FARXIGA. PATIENT HAS BEEN ON THIS MEDICATION FOR ABOUT A WEEK. PATIENT STARTED HAVING ISSUES WITH HER FOOT ABOUT A WEEK INTO THE MEDICATION. IT IS NOT IN THE TOES ITS IN THE FRONT LOWER PARK OF FOOT CAUSING PAIN TO WALK ON IT. PATIENT IS JUST HAVING A LOT OF TROUBLE GETTING AROUND. WALKING IS VERY PAINFUL. OTHER THAN THAT SHE IS FINE.      PATIENT CALLED THE KIDNEY DOCTOR THEY TOLD PATIENT TO STOP THE MEDICATION AND REACH OUT TO YOU TO SEE WHAT YOU ADVISE.     PLEASE CALL

## 2024-07-31 NOTE — TELEPHONE ENCOUNTER
Any open wounds or discoloration? If not, would monitor for signs of infection and follow up. Can also go to urgent care for further evaluation

## 2024-08-01 NOTE — TELEPHONE ENCOUNTER
Spoke with patient in detail. Voiced understanding. No discoloration or anything. Will call back in few days if it does continue

## 2024-08-08 ENCOUNTER — OFFICE VISIT (OUTPATIENT)
Dept: CARDIOLOGY | Facility: CLINIC | Age: 74
End: 2024-08-08
Payer: MEDICARE

## 2024-08-08 VITALS
DIASTOLIC BLOOD PRESSURE: 68 MMHG | OXYGEN SATURATION: 95 % | SYSTOLIC BLOOD PRESSURE: 122 MMHG | RESPIRATION RATE: 16 BRPM | HEIGHT: 72 IN | WEIGHT: 220 LBS | HEART RATE: 60 BPM | BODY MASS INDEX: 29.8 KG/M2

## 2024-08-08 DIAGNOSIS — E78.00 PURE HYPERCHOLESTEROLEMIA: ICD-10-CM

## 2024-08-08 DIAGNOSIS — I12.9 BENIGN HYPERTENSION WITH CKD (CHRONIC KIDNEY DISEASE) STAGE III: ICD-10-CM

## 2024-08-08 DIAGNOSIS — N18.30 BENIGN HYPERTENSION WITH CKD (CHRONIC KIDNEY DISEASE) STAGE III: ICD-10-CM

## 2024-08-08 DIAGNOSIS — I10 ESSENTIAL HYPERTENSION: Primary | ICD-10-CM

## 2024-08-08 PROBLEM — E78.5 HYPERLIPIDEMIA: Status: RESOLVED | Noted: 2023-09-22 | Resolved: 2024-08-08

## 2024-08-08 PROBLEM — E78.5 HYPERLIPIDEMIA: Status: RESOLVED | Noted: 2021-07-21 | Resolved: 2024-08-08

## 2024-08-08 RX ORDER — FUROSEMIDE 40 MG/1
40 TABLET ORAL AS NEEDED
Qty: 90 TABLET | Refills: 3 | Status: SHIPPED | OUTPATIENT
Start: 2024-08-08

## 2024-08-08 RX ORDER — LOSARTAN POTASSIUM 25 MG/1
25 TABLET ORAL 2 TIMES DAILY
Qty: 180 TABLET | Refills: 3 | Status: SHIPPED | OUTPATIENT
Start: 2024-08-08 | End: 2025-08-09

## 2024-08-08 RX ORDER — CLONIDINE HYDROCHLORIDE 0.1 MG/1
0.1 TABLET ORAL 2 TIMES DAILY
Qty: 180 TABLET | Refills: 3 | Status: SHIPPED | OUTPATIENT
Start: 2024-08-08

## 2024-08-08 NOTE — PROGRESS NOTES
"      CARDIOLOGY    Marilia Broderick MD    ENCOUNTER DATE:  08/08/2024    Mendy Woods / 74 y.o. / female        CHIEF COMPLAINT / REASON FOR OFFICE VISIT     Heart Problem (New patient/Hypertension w/ckd /AVILA )      HISTORY OF PRESENT ILLNESS       HPI    Mendy Woods is a 74 y.o. female     This is a nice lady whose  is also a patient of mine. She had been following with Dr. Fuchs but wanted to come to the [place] location. She has hypertension, hyperlipidemia, type 2 diabetes, and chronic kidney disease.     She reports good blood pressure control. She is not very active. She was doing some chair yoga. She is not having chest pain or palpitations. Dr. Annetta Jeffers tried putting her on Farxiga but she had burning in her feet and had to stop it. She says she has been off about 6 days and her feet are feeling better.         VITAL SIGNS     Visit Vitals  /68 (BP Location: Right arm, Patient Position: Sitting, Cuff Size: Adult) Comment: 110/65 with pt machine   Pulse 60   Resp 16   Ht 185.4 cm (73\")   Wt 99.8 kg (220 lb)   SpO2 95%   BMI 29.03 kg/m²         Wt Readings from Last 3 Encounters:   08/08/24 99.8 kg (220 lb)   01/30/24 103 kg (227 lb)   01/03/24 102 kg (224 lb)     Body mass index is 29.03 kg/m².      PHYSICAL EXAMINATION     Constitutional:       General: Not in acute distress.  Neck:      Vascular: No carotid bruit or JVD.   Pulmonary:      Effort: Pulmonary effort is normal.      Breath sounds: Normal breath sounds.   Cardiovascular:      Normal rate. Regular rhythm.      Murmurs: There is no murmur.   Psychiatric:         Mood and Affect: Mood and affect normal.           REVIEWED DATA       ECG 12 Lead    Date/Time: 8/8/2024 1:45 PM  Performed by: Marilia Broderick MD    Authorized by: Marilia Broderick MD  Comparison: compared with previous ECG from 1/30/2024  Similar to previous ECG  Rhythm: sinus rhythm  BPM: 60  Conduction: conduction normal  ST Segments: ST " segments normal  T Waves: T waves normal    Clinical impression: normal ECG                  Lab Results   Component Value Date    GLUCOSE 207 (H) 07/09/2024    BUN 58 (H) 07/09/2024    CREATININE 2.83 (H) 07/09/2024     07/09/2024    K 4.8 07/09/2024     07/09/2024    CALCIUM 9.3 07/09/2024    PROTEINTOT 8.0 01/23/2023    ALBUMIN 3.8 07/09/2024    ALT 9 05/09/2023    AST 13 05/09/2023    ALKPHOS 135 (H) 05/09/2023    BILITOT 0.4 05/09/2023    GLOB 3.2 01/10/2022    AGRATIO 1.4 01/10/2022    BCR 20.5 07/09/2024    ANIONGAP 9.5 07/09/2024    EGFR 16.9 (L) 07/09/2024       ASSESSMENT & PLAN      Diagnosis Plan   1. Essential hypertension        2. Pure hypercholesterolemia        3. Benign hypertension with CKD (chronic kidney disease) stage III            Hypertension. Blood pressure is controlled on her current regimen. I am not going to make any changes today.   Hyperlipidemia. She is on high dose atorvastatin.   Diabetes. On insulin. Has not tolerated Farxiga and tried it twice.   Chronic kidney disease. Followed by Dr. Annetta Jefefrs.     I am going to have her follow up with Dr. Annetta Jeffers next year as she wants to continue to come to [place] and wants to advocate that we have more specialists at [place].         Orders Placed This Encounter   Procedures    ECG 12 Lead     This order was created via procedure documentation     Order Specific Question:   Release to patient     Answer:   Routine Release [[id number]]           MEDICATIONS         Discharge Medications            Accurate as of August 8, 2024  1:46 PM. If you have any questions, ask your nurse or doctor.                Continue These Medications        Instructions Start Date   aspirin 81 MG EC tablet   Oral      atorvastatin 80 MG tablet  Commonly known as: LIPITOR   80 mg, Oral, Daily      B-12 100-5000 MCG sublingual tablet   Sublingual      BD Pen Needle Neli U/F 32G X 4 MM misc  Generic drug: Insulin Pen Needle   1  Device, Does not apply, 2 times daily      carvedilol 25 MG tablet  Commonly known as: COREG   25 mg, Oral, 2 Times Daily With Meals      cetirizine 5 MG tablet  Commonly known as: zyrTEC   5 mg, Oral, Daily      cloNIDine 0.1 MG tablet  Commonly known as: CATAPRES   0.1 mg, Oral, 2 Times Daily      Diclofenac Sodium 1 % gel gel  Commonly known as: VOLTAREN   4 g, Topical, 4 Times Daily PRN      ferrous sulfate 324 (65 Fe) MG tablet delayed-release EC tablet   324 mg, Oral, 2 Times Daily With Meals      FreeStyle Hien 2 [place] device   1 each, Does not apply, Take As Directed      FreeStyle Hien 2 Sensor misc   1 application , Does not apply, Every 14 Days      furosemide 40 MG tablet  Commonly known as: LASIX   40 mg, Oral, As Needed      glucose monitor monitoring kit   1 each, Does not apply, As Needed      Insulin Lispro (1 Unit Dial) 100 UNIT/ML solution pen-injector  Commonly known as: HumaLOG KwikPen   7 units 3 times a day with meals.      Lantus SoloStar 100 UNIT/ML injection pen  Generic drug: Insulin Glargine   INJECT 45 UNITS SUBCUTANEOUSLY DAILY      losartan 25 MG tablet  Commonly known as: COZAAR   25 mg, Oral, 2 Times Daily      Premarin 0.625 MG/GM vaginal cream  Generic drug: Estrogens Conjugated   APPLY SMALL AMOUNT TO AFFECTED AREA DAILY X 2 WEEKS, THEN TWICE WEEKLY      Synthroid 137 MCG tablet  Generic drug: levothyroxine   TAKE 1 TABLET BY MOUTH EVERY DAY      Vitamin D (Cholecalciferol) 10 MCG (400 UNIT) tablet  Commonly known as: CHOLECALCIFEROL   400 Units, Oral, Daily      vitamin E 100 UNIT capsule   100 Units, Oral, Daily             Stop These Medications      saccharomyces boulardii 250 MG capsule  Commonly known as: FLORASTOR  Stopped by: Marilia Broderick MD  08/08/24  13:46 EDT    Part of this note may be an electronic transcription/translation of spoken language to printed text using the Dragon dictation system.

## 2024-08-22 NOTE — TELEPHONE ENCOUNTER
Rx Refill Note  Requested Prescriptions     Pending Prescriptions Disp Refills    Synthroid 137 MCG tablet [Pharmacy Med Name: SYNTHROID 137 MCG TABLET] 90 tablet 1     Sig: TAKE 1 TABLET BY MOUTH EVERY DAY    Insulin Lispro, 1 Unit Dial, (HUMALOG) 100 UNIT/ML solution pen-injector [Pharmacy Med Name: INSULIN LISPRO 100 UNIT/ML PEN]  3     Si UNITS 3 TIMES A DAY WITH MEALS.      Last office visit with prescribing clinician: 10/24/2023   Last telemedicine visit with prescribing clinician: Visit date not found   Next office visit with prescribing clinician: 10/29/2024                         Would you like a call back once the refill request has been completed: [] Yes [] No    If the office needs to give you a call back, can they leave a voicemail: [] Yes [] No    Mat Poole MA  24, 14:49 EDT

## 2024-08-23 RX ORDER — LEVOTHYROXINE SODIUM 137 MCG
TABLET ORAL
Qty: 90 TABLET | Refills: 1 | Status: SHIPPED | OUTPATIENT
Start: 2024-08-23

## 2024-08-23 RX ORDER — INSULIN LISPRO 100 [IU]/ML
INJECTION, SOLUTION INTRAVENOUS; SUBCUTANEOUS
Qty: 15 ML | Refills: 3 | Status: SHIPPED | OUTPATIENT
Start: 2024-08-23

## 2024-08-30 ENCOUNTER — TRANSCRIBE ORDERS (OUTPATIENT)
Dept: ADMINISTRATIVE | Facility: HOSPITAL | Age: 74
End: 2024-08-30
Payer: MEDICARE

## 2024-08-30 ENCOUNTER — LAB (OUTPATIENT)
Dept: LAB | Facility: HOSPITAL | Age: 74
End: 2024-08-30
Payer: MEDICARE

## 2024-08-30 DIAGNOSIS — E10.9 INSULIN DEPENDENT DIABETES MELLITUS TYPE IA: Primary | ICD-10-CM

## 2024-08-30 DIAGNOSIS — E10.9 INSULIN DEPENDENT DIABETES MELLITUS TYPE IA: ICD-10-CM

## 2024-08-30 DIAGNOSIS — E78.5 HYPERLIPIDEMIA, UNSPECIFIED HYPERLIPIDEMIA TYPE: ICD-10-CM

## 2024-08-30 LAB
ALBUMIN SERPL-MCNC: 3.8 G/DL (ref 3.5–5.2)
ALBUMIN/GLOB SERPL: 1 G/DL
ALP SERPL-CCNC: 125 U/L (ref 39–117)
ALT SERPL W P-5'-P-CCNC: 9 U/L (ref 1–33)
ANION GAP SERPL CALCULATED.3IONS-SCNC: 10 MMOL/L (ref 5–15)
AST SERPL-CCNC: 14 U/L (ref 1–32)
BILIRUB SERPL-MCNC: 0.4 MG/DL (ref 0–1.2)
BUN SERPL-MCNC: 47 MG/DL (ref 8–23)
BUN/CREAT SERPL: 16.9 (ref 7–25)
CALCIUM SPEC-SCNC: 9.4 MG/DL (ref 8.6–10.5)
CHLORIDE SERPL-SCNC: 104 MMOL/L (ref 98–107)
CO2 SERPL-SCNC: 25 MMOL/L (ref 22–29)
CREAT SERPL-MCNC: 2.78 MG/DL (ref 0.57–1)
EGFRCR SERPLBLD CKD-EPI 2021: 17.3 ML/MIN/1.73
GLOBULIN UR ELPH-MCNC: 3.9 GM/DL
GLUCOSE SERPL-MCNC: 126 MG/DL (ref 65–99)
POTASSIUM SERPL-SCNC: 4.3 MMOL/L (ref 3.5–5.2)
PROT SERPL-MCNC: 7.7 G/DL (ref 6–8.5)
SODIUM SERPL-SCNC: 139 MMOL/L (ref 136–145)

## 2024-08-30 PROCEDURE — 36415 COLL VENOUS BLD VENIPUNCTURE: CPT

## 2024-08-30 PROCEDURE — 80053 COMPREHEN METABOLIC PANEL: CPT

## 2024-10-02 RX ORDER — FUROSEMIDE 40 MG
40 TABLET ORAL AS NEEDED
Qty: 90 TABLET | Refills: 3 | Status: SHIPPED | OUTPATIENT
Start: 2024-10-02 | End: 2024-10-03 | Stop reason: SDUPTHER

## 2024-10-02 RX ORDER — CLONIDINE HYDROCHLORIDE 0.1 MG/1
0.1 TABLET ORAL 2 TIMES DAILY
Qty: 180 TABLET | Refills: 3 | Status: SHIPPED | OUTPATIENT
Start: 2024-10-02

## 2024-10-03 ENCOUNTER — TELEPHONE (OUTPATIENT)
Dept: CARDIOLOGY | Facility: CLINIC | Age: 74
End: 2024-10-03
Payer: MEDICARE

## 2024-10-03 RX ORDER — FUROSEMIDE 40 MG
40 TABLET ORAL DAILY PRN
Qty: 90 TABLET | Refills: 3 | Status: SHIPPED | OUTPATIENT
Start: 2024-10-03

## 2024-10-03 NOTE — TELEPHONE ENCOUNTER
[facility] would like a Script clarification for Furosemide 40 mg.     They would like for you to clarify frequency of use such as once daily, twice daily, Q 4 hours, etc.      Please advise      Phone number  [phone]    Fax [phone]

## 2024-10-20 DIAGNOSIS — E78.00 PURE HYPERCHOLESTEROLEMIA: ICD-10-CM

## 2024-10-21 ENCOUNTER — TRANSCRIBE ORDERS (OUTPATIENT)
Dept: ADMINISTRATIVE | Facility: HOSPITAL | Age: 74
End: 2024-10-21
Payer: MEDICARE

## 2024-10-21 ENCOUNTER — LAB (OUTPATIENT)
Dept: LAB | Facility: HOSPITAL | Age: 74
End: 2024-10-21
Payer: MEDICARE

## 2024-10-21 DIAGNOSIS — E55.9 VITAMIN D DEFICIENCY: ICD-10-CM

## 2024-10-21 DIAGNOSIS — N18.4 CHRONIC KIDNEY DISEASE, STAGE IV (SEVERE): ICD-10-CM

## 2024-10-21 DIAGNOSIS — E21.1 SECONDARY HYPERPARATHYROIDISM, NON-RENAL: ICD-10-CM

## 2024-10-21 DIAGNOSIS — I10 ESSENTIAL HYPERTENSION, BENIGN: ICD-10-CM

## 2024-10-21 DIAGNOSIS — N18.4 CHRONIC KIDNEY DISEASE, STAGE IV (SEVERE): Primary | ICD-10-CM

## 2024-10-21 LAB
25(OH)D3 SERPL-MCNC: 31.4 NG/ML (ref 30–100)
ALBUMIN SERPL-MCNC: 4 G/DL (ref 3.5–5.2)
ANION GAP SERPL CALCULATED.3IONS-SCNC: 9.5 MMOL/L (ref 5–15)
BACTERIA UR QL AUTO: ABNORMAL /HPF
BILIRUB UR QL STRIP: NEGATIVE
BUN SERPL-MCNC: 51 MG/DL (ref 8–23)
BUN/CREAT SERPL: 16.9 (ref 7–25)
CALCIUM SPEC-SCNC: 9.2 MG/DL (ref 8.6–10.5)
CHLORIDE SERPL-SCNC: 107 MMOL/L (ref 98–107)
CLARITY UR: ABNORMAL
CO2 SERPL-SCNC: 23.5 MMOL/L (ref 22–29)
COLOR UR: YELLOW
CREAT SERPL-MCNC: 3.02 MG/DL (ref 0.57–1)
CREAT UR-MCNC: 41.5 MG/DL
DEPRECATED RDW RBC AUTO: 45.8 FL (ref 37–54)
EGFRCR SERPLBLD CKD-EPI 2021: 15.6 ML/MIN/1.73
ERYTHROCYTE [DISTWIDTH] IN BLOOD BY AUTOMATED COUNT: 13.9 % (ref 12.3–15.4)
GLUCOSE SERPL-MCNC: 157 MG/DL (ref 65–99)
GLUCOSE UR STRIP-MCNC: NEGATIVE MG/DL
HCT VFR BLD AUTO: 36.3 % (ref 34–46.6)
HGB BLD-MCNC: 11.6 G/DL (ref 12–15.9)
HGB UR QL STRIP.AUTO: NEGATIVE
HYALINE CASTS UR QL AUTO: ABNORMAL /LPF
KETONES UR QL STRIP: NEGATIVE
LEUKOCYTE ESTERASE UR QL STRIP.AUTO: ABNORMAL
MAGNESIUM SERPL-MCNC: 1.9 MG/DL (ref 1.6–2.4)
MCH RBC QN AUTO: 28.6 PG (ref 26.6–33)
MCHC RBC AUTO-ENTMCNC: 32 G/DL (ref 31.5–35.7)
MCV RBC AUTO: 89.4 FL (ref 79–97)
NITRITE UR QL STRIP: NEGATIVE
PH UR STRIP.AUTO: 5.5 [PH] (ref 5–8)
PHOSPHATE SERPL-MCNC: 3.7 MG/DL (ref 2.5–4.5)
PLATELET # BLD AUTO: 214 10*3/MM3 (ref 140–450)
PMV BLD AUTO: 9.1 FL (ref 6–12)
POTASSIUM SERPL-SCNC: 5.1 MMOL/L (ref 3.5–5.2)
PROT ?TM UR-MCNC: 6.4 MG/DL
PROT UR QL STRIP: NEGATIVE
PROT/CREAT UR: 154.2 MG/G CREA (ref 0–200)
PTH-INTACT SERPL-MCNC: 133 PG/ML (ref 15–65)
RBC # BLD AUTO: 4.06 10*6/MM3 (ref 3.77–5.28)
RBC # UR STRIP: ABNORMAL /HPF
REF LAB TEST METHOD: ABNORMAL
SODIUM SERPL-SCNC: 140 MMOL/L (ref 136–145)
SP GR UR STRIP: 1.01 (ref 1–1.03)
SQUAMOUS #/AREA URNS HPF: ABNORMAL /HPF
UROBILINOGEN UR QL STRIP: ABNORMAL
WBC # UR STRIP: ABNORMAL /HPF
WBC NRBC COR # BLD AUTO: 9.98 10*3/MM3 (ref 3.4–10.8)

## 2024-10-21 PROCEDURE — 84156 ASSAY OF PROTEIN URINE: CPT

## 2024-10-21 PROCEDURE — 82306 VITAMIN D 25 HYDROXY: CPT

## 2024-10-21 PROCEDURE — 36415 COLL VENOUS BLD VENIPUNCTURE: CPT

## 2024-10-21 PROCEDURE — 83970 ASSAY OF PARATHORMONE: CPT

## 2024-10-21 PROCEDURE — 80069 RENAL FUNCTION PANEL: CPT

## 2024-10-21 PROCEDURE — 82570 ASSAY OF URINE CREATININE: CPT

## 2024-10-21 PROCEDURE — 83735 ASSAY OF MAGNESIUM: CPT

## 2024-10-21 PROCEDURE — 81001 URINALYSIS AUTO W/SCOPE: CPT

## 2024-10-21 PROCEDURE — 85027 COMPLETE CBC AUTOMATED: CPT

## 2024-10-21 NOTE — TELEPHONE ENCOUNTER
Rx Refill Note  Requested Prescriptions     Pending Prescriptions Disp Refills    atorvastatin (LIPITOR) 80 MG tablet [Pharmacy Med Name: ATORVASTATIN 80 MG TABLET] 90 tablet 3     Sig: TAKE 1 TABLET BY MOUTH EVERY DAY      Last office visit with prescribing clinician: 10/24/2023   Last telemedicine visit with prescribing clinician: Visit date not found   Next office visit with prescribing clinician: 10/30/2024                         Would you like a call back once the refill request has been completed: [] Yes [] No    If the office needs to give you a call back, can they leave a voicemail: [] Yes [] No    Mat Poole MA  10/21/24, 07:57 EDT

## 2024-10-22 RX ORDER — ATORVASTATIN CALCIUM 80 MG/1
80 TABLET, FILM COATED ORAL DAILY
Qty: 90 TABLET | Refills: 3 | Status: SHIPPED | OUTPATIENT
Start: 2024-10-22

## 2024-10-24 RX ORDER — CLONIDINE HYDROCHLORIDE 0.1 MG/1
0.1 TABLET ORAL 2 TIMES DAILY
Qty: 180 TABLET | Refills: 3 | Status: SHIPPED | OUTPATIENT
Start: 2024-10-24

## 2024-10-29 ENCOUNTER — OFFICE VISIT (OUTPATIENT)
Dept: FAMILY MEDICINE CLINIC | Facility: CLINIC | Age: 74
End: 2024-10-29
Payer: MEDICARE

## 2024-10-29 VITALS
HEART RATE: 72 BPM | SYSTOLIC BLOOD PRESSURE: 134 MMHG | BODY MASS INDEX: 28.91 KG/M2 | OXYGEN SATURATION: 98 % | HEIGHT: 72 IN | WEIGHT: 213.4 LBS | DIASTOLIC BLOOD PRESSURE: 86 MMHG

## 2024-10-29 DIAGNOSIS — Z00.00 MEDICARE ANNUAL WELLNESS VISIT, SUBSEQUENT: Primary | ICD-10-CM

## 2024-10-29 PROCEDURE — 1170F FXNL STATUS ASSESSED: CPT | Performed by: NURSE PRACTITIONER

## 2024-10-29 PROCEDURE — G0439 PPPS, SUBSEQ VISIT: HCPCS | Performed by: NURSE PRACTITIONER

## 2024-10-29 PROCEDURE — 3079F DIAST BP 80-89 MM HG: CPT | Performed by: NURSE PRACTITIONER

## 2024-10-29 PROCEDURE — 1126F AMNT PAIN NOTED NONE PRSNT: CPT | Performed by: NURSE PRACTITIONER

## 2024-10-29 PROCEDURE — 3075F SYST BP GE 130 - 139MM HG: CPT | Performed by: NURSE PRACTITIONER

## 2024-10-29 NOTE — PROGRESS NOTES
Subjective   The ABCs of the Annual Wellness Visit  Medicare Wellness Visit      Mendy Woods is a 74 y.o. patient who presents for a Medicare Wellness Visit.    The following portions of the patient's history were reviewed and   updated as appropriate: allergies, current medications, past family history, past medical history, past social history, past surgical history, and problem list.    Compared to one year ago, the patient's physical   health is the same. Does report more fatigued  Compared to one year ago, the patient's mental   health is the same.    Recent Hospitalizations:  She was not admitted to the hospital during the last year.     Current Medical Providers:  Patient Care Team:  Felicia Duffy APRN as PCP - General (Family Medicine)    Outpatient Medications Prior to Visit   Medication Sig Dispense Refill    aspirin 81 MG EC tablet Take  by mouth.      atorvastatin (LIPITOR) 80 MG tablet TAKE 1 TABLET BY MOUTH EVERY DAY 90 tablet 3    carvedilol (COREG) 25 MG tablet Take 1 tablet by mouth 2 (Two) Times a Day With Meals. 180 tablet 2    cetirizine (zyrTEC) 5 MG tablet Take 1 tablet by mouth Daily.      cloNIDine (CATAPRES) 0.1 MG tablet Take 1 tablet by mouth 2 (Two) Times a Day. 180 tablet 3    Cobalamin Combinations (B-12) 100-5000 MCG sublingual tablet Place  under the tongue.      Continuous Blood Gluc  (FreeStyle Hien 2 [place]) device 1 EACH TAKE AS DIRECTED. 1 each 0    Continuous Blood Gluc Sensor (FreeStyle Hien 2 Sensor) misc 1 APPLICATION EVERY 14 (FOURTEEN) DAYS. 6 each 1    Diclofenac Sodium (VOLTAREN) 1 % gel gel Apply 4 g topically to the appropriate area as directed 4 (Four) Times a Day As Needed.      Estrogens Conjugated (Premarin) 0.625 MG/GM vaginal cream APPLY SMALL AMOUNT TO AFFECTED AREA DAILY X 2 WEEKS, THEN TWICE WEEKLY 30 g 0    ferrous sulfate 324 (65 Fe) MG tablet delayed-release EC tablet Take 1 tablet by mouth 2 (Two) Times a Day With Meals.       furosemide (LASIX) 40 MG tablet Take 1 tablet by mouth Daily As Needed (swelling). 90 tablet 3    glucose monitor monitoring kit 1 each As Needed (Type 1 dm). 1 each 0    Insulin Lispro, 1 Unit Dial, (HUMALOG) 100 UNIT/ML solution pen-injector 7 UNITS 3 TIMES A DAY WITH MEALS. 15 mL 3    Insulin Pen Needle (BD Pen Needle Neli U/F) 32G X 4 MM misc 1 Device 2 (two) times a day. 200 each 1    Lantus SoloStar 100 UNIT/ML injection pen INJECT 45 UNITS SUBCUTANEOUSLY DAILY 45 mL 1    losartan (COZAAR) 25 MG tablet Take 1 tablet by mouth 2 (Two) Times a Day. 180 tablet 3    Synthroid 137 MCG tablet TAKE 1 TABLET BY MOUTH EVERY DAY 90 tablet 1    Vitamin D, Cholecalciferol, (CHOLECALCIFEROL) 10 MCG (400 UNIT) tablet Take 1 tablet by mouth Daily.      vitamin E 100 UNIT capsule Take 1 capsule by mouth Daily.       No facility-administered medications prior to visit.     No opioid medication identified on active medication list. I have reviewed chart for other potential  high risk medication/s and harmful drug interactions in the elderly.      Aspirin is on active medication list. Aspirin use is indicated based on review of current medical condition/s. Pros and cons of this therapy have been discussed today. Benefits of this medication outweigh potential harm.  Patient has been encouraged to continue taking this medication.  .      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  ACP discussion was held with the patient during this visit. Patient has an advance directive in EMR which is still valid.             Objective   Vitals:    10/29/24 1415   BP: 134/86   BP Location: Left arm   Patient Position: Sitting   Cuff Size: Large Adult   Pulse: 72   SpO2: 98%   Weight: 96.8 kg (213 lb 6.4 oz)   Height: 185.4 cm (73\")       Estimated body mass index is 28.15 kg/m² as calculated from the following:    Height as of this encounter: 185.4 cm (73\").    Weight as of this encounter: 96.8 kg (213 lb 6.4 oz).    BMI is >= 25 and <30. (Overweight) The following options were offered after discussion;: weight loss educational material (shared in after visit summary) and exercise counseling/recommendations       Does the patient have evidence of cognitive impairment? No                                                                                                Health  Risk Assessment    Smoking Status:  Social History     Tobacco Use   Smoking Status Never    Passive exposure: Never   Smokeless Tobacco Never   Tobacco Comments    Don't like it     Alcohol Consumption:  Social History     Substance and Sexual Activity   Alcohol Use Not Currently    Comment: Only very occasionally       Fall Risk Screen  STEADI Fall Risk Assessment was completed, and patient is at LOW risk for falls.Assessment completed on:10/29/2024    Depression Screening:      10/29/2024     2:17 PM   PHQ-2/PHQ-9 Depression Screening   Little interest or pleasure in doing things Not at all   Feeling down, depressed, or hopeless Not at all     Health Habits and Functional and Cognitive " Screening:      10/29/2024     2:00 PM   Functional & Cognitive Status   Do you have difficulty preparing food and eating? No   Do you have difficulty bathing yourself, getting dressed or grooming yourself? No   Do you have difficulty using the toilet? No   Do you have difficulty moving around from place to place? No   Do you have trouble with steps or getting out of a bed or a chair? No   Current Diet Limited Junk Food   Dental Exam Up to date   Eye Exam Up to date   Exercise (times per week) 0 times per week   Current Exercises Include No Regular Exercise   Do you need help using the phone?  No   Are you deaf or do you have serious difficulty hearing?  No   Do you need help to go to places out of walking distance? No   Do you need help shopping? No   Do you need help preparing meals?  No   Do you need help with housework?  No   Do you need help with laundry? No   Do you need help taking your medications? No   Do you need help managing money? No   Do you ever drive or ride in a car without wearing a seat belt? No   Have you felt unusual stress, anger or loneliness in the last month? No   Who do you live with? Spouse   If you need help, do you have trouble finding someone available to you? No   Have you been bothered in the last four weeks by sexual problems? No   Do you have difficulty concentrating, remembering or making decisions? No           Age-appropriate Screening Schedule:  Refer to the list below for future screening recommendations based on patient's age, sex and/or medical conditions. Orders for these recommended tests are listed in the plan section. The patient has been provided with a written plan.    Health Maintenance List  Health Maintenance   Topic Date Due    ZOSTER VACCINE (2 of 3) 12/18/2017    MAMMOGRAM  09/29/2019    DXA SCAN  10/05/2019    DIABETIC FOOT EXAM  08/18/2023    BMI FOLLOWUP  08/18/2023    DIABETIC EYE EXAM  08/15/2024    COLORECTAL CANCER SCREENING  01/11/2025    COVID-19 Vaccine  (7 - 2023-24 season) 11/12/2024    LIPID PANEL  02/01/2025    HEMOGLOBIN A1C  02/19/2025    ANNUAL WELLNESS VISIT  10/29/2025    TDAP/TD VACCINES (2 - Td or Tdap) 10/23/2027    RSV Vaccine - Adults  Completed    INFLUENZA VACCINE  Completed    Pneumococcal Vaccine 65+  Completed    HEPATITIS C SCREENING  Discontinued    URINE MICROALBUMIN  Discontinued   Physical Exam  Constitutional:       General: She is not in acute distress.     Appearance: She is well-developed. She is not diaphoretic.   Cardiovascular:      Rate and Rhythm: Normal rate and regular rhythm.      Heart sounds: Normal heart sounds. No murmur heard.     No friction rub. No gallop.   Pulmonary:      Effort: Pulmonary effort is normal. No respiratory distress.      Breath sounds: Normal breath sounds. No wheezing or rales.   Musculoskeletal:      Cervical back: Neck supple.   Skin:     General: Skin is warm and dry.   Neurological:      Mental Status: She is alert and oriented to person, place, and time.                                                                                                                                                    CMS Preventative Services Quick Reference  Risk Factors Identified During Encounter  Immunizations Discussed/Encouraged: Influenza and COVID19    The above risks/problems have been discussed with the patient.  Pertinent information has been shared with the patient in the After Visit Summary.  An After Visit Summary and PPPS were made available to the patient.    Follow Up:   Next Medicare Wellness visit to be scheduled in 1 year.         Additional E&M Note during same encounter follows:  Patient has additional, significant, and separately identifiable condition(s)/problem(s) that require work above and beyond the Medicare Wellness Visit

## 2024-10-30 RX ORDER — BLOOD-GLUCOSE SENSOR
1 EACH MISCELLANEOUS DAILY
Qty: 2 EACH | Refills: 12
Start: 2024-10-30

## 2024-11-13 RX ORDER — BLOOD-GLUCOSE SENSOR
1 EACH MISCELLANEOUS DAILY
Qty: 2 EACH | Refills: 12
Start: 2024-11-13

## 2024-11-13 RX ORDER — INSULIN GLARGINE 100 [IU]/ML
45 INJECTION, SOLUTION SUBCUTANEOUS DAILY
Qty: 45 ML | Refills: 1 | Status: SHIPPED | OUTPATIENT
Start: 2024-11-13

## 2024-11-13 RX ORDER — CONJUGATED ESTROGENS 0.62 MG/G
CREAM VAGINAL
Qty: 30 G | Refills: 0 | Status: SHIPPED | OUTPATIENT
Start: 2024-11-13

## 2024-11-13 RX ORDER — INSULIN LISPRO 100 [IU]/ML
INJECTION, SOLUTION INTRAVENOUS; SUBCUTANEOUS
Qty: 15 ML | Refills: 3 | Status: SHIPPED | OUTPATIENT
Start: 2024-11-13

## 2024-11-13 RX ORDER — PEN NEEDLE, DIABETIC 32GX 5/32"
NEEDLE, DISPOSABLE MISCELLANEOUS
Qty: 200 EACH | Refills: 1 | Status: SHIPPED | OUTPATIENT
Start: 2024-11-13

## 2024-11-13 NOTE — TELEPHONE ENCOUNTER
Rx Refill Note  Requested Prescriptions     Pending Prescriptions Disp Refills    Insulin Glargine (Lantus SoloStar) 100 UNIT/ML injection pen 45 mL 1    Estrogens Conjugated (Premarin) 0.625 MG/GM vaginal cream 30 g 0     Sig: APPLY SMALL AMOUNT TO AFFECTED AREA DAILY X 2 WEEKS, THEN TWICE WEEKLY    Insulin Lispro, 1 Unit Dial, (HUMALOG) 100 UNIT/ML solution pen-injector 15 mL 3     Si units 3 times a day with meals.    Continuous Glucose Sensor (FreeStyle Hien 3 Sensor) misc 2 each 12     Sig: Use 1 Application Daily.      Last office visit with prescribing clinician: 10/29/2024   Last telemedicine visit with prescribing clinician: Visit date not found   Next office visit with prescribing clinician: Visit date not found                         Would you like a call back once the refill request has been completed: [] Yes [] No    If the office needs to give you a call back, can they leave a voicemail: [] Yes [] No    Mat Poole MA  24, 16:53 EST

## 2024-11-22 RX ORDER — CLONIDINE HYDROCHLORIDE 0.1 MG/1
0.1 TABLET ORAL 2 TIMES DAILY
Qty: 180 TABLET | Refills: 3 | Status: SHIPPED | OUTPATIENT
Start: 2024-11-22

## 2024-11-26 ENCOUNTER — TELEPHONE (OUTPATIENT)
Dept: FAMILY MEDICINE CLINIC | Facility: CLINIC | Age: 74
End: 2024-11-26

## 2024-11-26 NOTE — TELEPHONE ENCOUNTER
Caller: [facility] - [place], [place] - [address] [place] [address] [phone] [facility] [phone] FX    Relationship: Pharmacy    Best call back number: [phone]    What is the best time to reach you: ANY    Who are you requesting to speak with (clinical staff, provider,  specific staff member): CLINICAL    Do you know the name of the person who called: NA    What was the call regarding: REQUESTING CLARIFICATION ON THE PREMARIN .625 MG VAGINAL CREAM, DIRECTIONS DOESN'T HAVE AN EXACT AMOUNT ON IT AND THE MEDICATION HAS AN APPLICATOR WITH IT     Is it okay if the provider responds through MyChart: NO

## 2024-12-04 RX ORDER — ACYCLOVIR 800 MG/1
1 TABLET ORAL DAILY
Qty: 2 EACH | Refills: 12
Start: 2024-12-04

## 2024-12-04 NOTE — TELEPHONE ENCOUNTER
Rx Refill Note  Requested Prescriptions     Pending Prescriptions Disp Refills    Continuous Glucose Sensor (FreeStyle Hien 3 Sensor) misc 2 each 12     Sig: Use 1 Application Daily.      Last office visit with prescribing clinician: 10/29/2024   Last telemedicine visit with prescribing clinician: Visit date not found   Next office visit with prescribing clinician: Visit date not found                         Would you like a call back once the refill request has been completed: [] Yes [] No    If the office needs to give you a call back, can they leave a voicemail: [] Yes [] No    Mat Poole MA  12/04/24, 14:54 EST

## 2024-12-09 RX ORDER — ACYCLOVIR 800 MG/1
1 TABLET ORAL DAILY
Qty: 2 EACH | Refills: 12 | Status: CANCELLED
Start: 2024-12-09

## 2024-12-11 ENCOUNTER — TELEPHONE (OUTPATIENT)
Dept: FAMILY MEDICINE CLINIC | Facility: CLINIC | Age: 74
End: 2024-12-11
Payer: MEDICARE

## 2024-12-11 RX ORDER — ACYCLOVIR 800 MG/1
1 TABLET ORAL DAILY
Qty: 6 EACH | Refills: 1 | Status: SHIPPED | OUTPATIENT
Start: 2024-12-11

## 2024-12-11 NOTE — TELEPHONE ENCOUNTER
Rx Refill Note  Requested Prescriptions     Pending Prescriptions Disp Refills    Continuous Glucose Sensor (FreeStyle Hien 3 Sensor) misc 6 each 1     Sig: Use 1 Application Daily.      Last office visit with prescribing clinician: 10/29/2024   Last telemedicine visit with prescribing clinician: Visit date not found   Next office visit with prescribing clinician: Visit date not found                         Would you like a call back once the refill request has been completed: [] Yes [] No    If the office needs to give you a call back, can they leave a voicemail: [] Yes [] No    Chiki Doll Rep  12/11/24, 11:25 EST

## 2024-12-11 NOTE — TELEPHONE ENCOUNTER
Caller: Mendy Woods    Relationship: Self    Best call back number: [phone]     What medication are you requesting: FREE STYLE CHARLENE SENSORS    If a prescription is needed, what is your preferred pharmacy and phone number: [id number] - [place], [place] - [address] AT [facility] [phone] [facility] [phone]

## 2025-01-22 ENCOUNTER — TRANSCRIBE ORDERS (OUTPATIENT)
Dept: LAB | Facility: HOSPITAL | Age: 75
End: 2025-01-22
Payer: MEDICARE

## 2025-01-22 ENCOUNTER — LAB (OUTPATIENT)
Dept: LAB | Facility: HOSPITAL | Age: 75
End: 2025-01-22
Payer: MEDICARE

## 2025-01-22 DIAGNOSIS — I10 ESSENTIAL HYPERTENSION, MALIGNANT: ICD-10-CM

## 2025-01-22 DIAGNOSIS — E55.9 VITAMIN D DEFICIENCY DISEASE: ICD-10-CM

## 2025-01-22 DIAGNOSIS — E21.1 SECONDARY HYPERPARATHYROIDISM, NON-RENAL: ICD-10-CM

## 2025-01-22 DIAGNOSIS — N18.4 CHRONIC KIDNEY DISEASE, STAGE IV (SEVERE): Primary | ICD-10-CM

## 2025-01-22 DIAGNOSIS — N18.4 CHRONIC KIDNEY DISEASE, STAGE IV (SEVERE): ICD-10-CM

## 2025-01-22 LAB
ALBUMIN SERPL-MCNC: 3.6 G/DL (ref 3.5–5.2)
ANION GAP SERPL CALCULATED.3IONS-SCNC: 10.6 MMOL/L (ref 5–15)
BUN SERPL-MCNC: 32 MG/DL (ref 8–23)
BUN/CREAT SERPL: 12.4 (ref 7–25)
CALCIUM SPEC-SCNC: 9.1 MG/DL (ref 8.6–10.5)
CHLORIDE SERPL-SCNC: 105 MMOL/L (ref 98–107)
CO2 SERPL-SCNC: 23.4 MMOL/L (ref 22–29)
CREAT SERPL-MCNC: 2.58 MG/DL (ref 0.57–1)
DEPRECATED RDW RBC AUTO: 48.5 FL (ref 37–54)
EGFRCR SERPLBLD CKD-EPI 2021: 18.9 ML/MIN/1.73
ERYTHROCYTE [DISTWIDTH] IN BLOOD BY AUTOMATED COUNT: 14.3 % (ref 12.3–15.4)
GLUCOSE SERPL-MCNC: 161 MG/DL (ref 65–99)
HCT VFR BLD AUTO: 35 % (ref 34–46.6)
HGB BLD-MCNC: 10.8 G/DL (ref 12–15.9)
MAGNESIUM SERPL-MCNC: 1.9 MG/DL (ref 1.6–2.4)
MCH RBC QN AUTO: 28.6 PG (ref 26.6–33)
MCHC RBC AUTO-ENTMCNC: 30.9 G/DL (ref 31.5–35.7)
MCV RBC AUTO: 92.8 FL (ref 79–97)
PHOSPHATE SERPL-MCNC: 3.2 MG/DL (ref 2.5–4.5)
PLATELET # BLD AUTO: 205 10*3/MM3 (ref 140–450)
PMV BLD AUTO: 9.2 FL (ref 6–12)
POTASSIUM SERPL-SCNC: 4.7 MMOL/L (ref 3.5–5.2)
RBC # BLD AUTO: 3.77 10*6/MM3 (ref 3.77–5.28)
SODIUM SERPL-SCNC: 139 MMOL/L (ref 136–145)
WBC NRBC COR # BLD AUTO: 8.95 10*3/MM3 (ref 3.4–10.8)

## 2025-01-22 PROCEDURE — 83735 ASSAY OF MAGNESIUM: CPT

## 2025-01-22 PROCEDURE — 36415 COLL VENOUS BLD VENIPUNCTURE: CPT

## 2025-01-22 PROCEDURE — 85027 COMPLETE CBC AUTOMATED: CPT

## 2025-01-22 PROCEDURE — 80069 RENAL FUNCTION PANEL: CPT

## 2025-01-30 DIAGNOSIS — E78.00 PURE HYPERCHOLESTEROLEMIA: ICD-10-CM

## 2025-01-30 RX ORDER — ATORVASTATIN CALCIUM 80 MG/1
80 TABLET, FILM COATED ORAL DAILY
Qty: 90 TABLET | Refills: 1 | Status: CANCELLED | OUTPATIENT
Start: 2025-01-30

## 2025-01-30 RX ORDER — LEVOTHYROXINE SODIUM 137 MCG
137 TABLET ORAL DAILY
Qty: 90 TABLET | Refills: 1 | Status: SHIPPED | OUTPATIENT
Start: 2025-01-30

## 2025-01-30 NOTE — TELEPHONE ENCOUNTER
Rx Refill Note  Requested Prescriptions     Pending Prescriptions Disp Refills    Synthroid 137 MCG tablet 90 tablet 1     Sig: Take 1 tablet by mouth Daily.      Last office visit with prescribing clinician: 10/29/2024   Last telemedicine visit with prescribing clinician: Visit date not found   Next office visit with prescribing clinician: Visit date not found                         Would you like a call back once the refill request has been completed: [] Yes [] No    If the office needs to give you a call back, can they leave a voicemail: [] Yes [] No    Mat Poole MA  01/30/25, 12:06 EST

## 2025-02-06 ENCOUNTER — LAB (OUTPATIENT)
Dept: LAB | Facility: HOSPITAL | Age: 75
End: 2025-02-06
Payer: MEDICARE

## 2025-02-06 ENCOUNTER — TRANSCRIBE ORDERS (OUTPATIENT)
Dept: ADMINISTRATIVE | Facility: HOSPITAL | Age: 75
End: 2025-02-06
Payer: MEDICARE

## 2025-02-06 DIAGNOSIS — N18.4 CHRONIC KIDNEY DISEASE, STAGE IV (SEVERE): ICD-10-CM

## 2025-02-06 DIAGNOSIS — N18.4 CHRONIC KIDNEY DISEASE, STAGE IV (SEVERE): Primary | ICD-10-CM

## 2025-02-06 LAB
25(OH)D3 SERPL-MCNC: 35.7 NG/ML (ref 30–100)
ANION GAP SERPL CALCULATED.3IONS-SCNC: 11.3 MMOL/L (ref 5–15)
BACTERIA UR QL AUTO: ABNORMAL /HPF
BILIRUB UR QL STRIP: NEGATIVE
BUN SERPL-MCNC: 39 MG/DL (ref 8–23)
BUN/CREAT SERPL: 12.8 (ref 7–25)
CALCIUM SPEC-SCNC: 9.5 MG/DL (ref 8.6–10.5)
CHLORIDE SERPL-SCNC: 103 MMOL/L (ref 98–107)
CLARITY UR: CLEAR
CO2 SERPL-SCNC: 22.7 MMOL/L (ref 22–29)
COLOR UR: YELLOW
CREAT SERPL-MCNC: 3.05 MG/DL (ref 0.57–1)
CREAT UR-MCNC: 178.1 MG/DL
EGFRCR SERPLBLD CKD-EPI 2021: 15.4 ML/MIN/1.73
GLUCOSE SERPL-MCNC: 208 MG/DL (ref 65–99)
GLUCOSE UR STRIP-MCNC: NEGATIVE MG/DL
HGB UR QL STRIP.AUTO: NEGATIVE
HYALINE CASTS UR QL AUTO: ABNORMAL /LPF
KETONES UR QL STRIP: NEGATIVE
LEUKOCYTE ESTERASE UR QL STRIP.AUTO: ABNORMAL
NITRITE UR QL STRIP: NEGATIVE
PH UR STRIP.AUTO: 5.5 [PH] (ref 5–8)
POTASSIUM SERPL-SCNC: 4.3 MMOL/L (ref 3.5–5.2)
PROT ?TM UR-MCNC: 27 MG/DL
PROT UR QL STRIP: ABNORMAL
PROT/CREAT UR: 151.6 MG/G CREA (ref 0–200)
RBC # UR STRIP: ABNORMAL /HPF
REF LAB TEST METHOD: ABNORMAL
SODIUM SERPL-SCNC: 137 MMOL/L (ref 136–145)
SP GR UR STRIP: 1.01 (ref 1–1.03)
SQUAMOUS #/AREA URNS HPF: ABNORMAL /HPF
URATE SERPL-MCNC: 13 MG/DL (ref 2.4–5.7)
UROBILINOGEN UR QL STRIP: ABNORMAL
WBC # UR STRIP: ABNORMAL /HPF

## 2025-02-06 PROCEDURE — 81001 URINALYSIS AUTO W/SCOPE: CPT

## 2025-02-06 PROCEDURE — 82570 ASSAY OF URINE CREATININE: CPT

## 2025-02-06 PROCEDURE — 84156 ASSAY OF PROTEIN URINE: CPT

## 2025-02-06 PROCEDURE — 36415 COLL VENOUS BLD VENIPUNCTURE: CPT

## 2025-02-06 PROCEDURE — 84550 ASSAY OF BLOOD/URIC ACID: CPT

## 2025-02-06 PROCEDURE — 82306 VITAMIN D 25 HYDROXY: CPT

## 2025-02-06 PROCEDURE — 80048 BASIC METABOLIC PNL TOTAL CA: CPT

## 2025-02-08 DIAGNOSIS — E78.00 PURE HYPERCHOLESTEROLEMIA: ICD-10-CM

## 2025-02-10 RX ORDER — ATORVASTATIN CALCIUM 80 MG/1
80 TABLET, FILM COATED ORAL DAILY
Qty: 90 TABLET | Refills: 1 | Status: CANCELLED | OUTPATIENT
Start: 2025-02-10

## 2025-02-10 NOTE — TELEPHONE ENCOUNTER
Rx Refill Note  Requested Prescriptions     Pending Prescriptions Disp Refills    carvedilol (COREG) 25 MG tablet 180 tablet 1     Sig: Take 1 tablet by mouth 2 (Two) Times a Day With Meals.    Continuous Glucose Sensor (FreeStyle Hien 3 Sensor) misc 6 each 1     Sig: Use 1 Application Daily.      Last office visit with prescribing clinician: 10/29/2024   Last telemedicine visit with prescribing clinician: Visit date not found   Next office visit with prescribing clinician: Visit date not found                         Would you like a call back once the refill request has been completed: [] Yes [] No    If the office needs to give you a call back, can they leave a voicemail: [] Yes [] No    Mat Poole MA  02/10/25, 09:11 EST

## 2025-02-11 RX ORDER — ACYCLOVIR 800 MG/1
1 TABLET ORAL DAILY
Qty: 6 EACH | Refills: 1 | Status: SHIPPED | OUTPATIENT
Start: 2025-02-11

## 2025-02-11 RX ORDER — CARVEDILOL 25 MG/1
25 TABLET ORAL 2 TIMES DAILY WITH MEALS
Qty: 180 TABLET | Refills: 1 | Status: SHIPPED | OUTPATIENT
Start: 2025-02-11

## 2025-02-18 ENCOUNTER — TELEPHONE (OUTPATIENT)
Dept: FAMILY MEDICINE CLINIC | Facility: CLINIC | Age: 75
End: 2025-02-18

## 2025-02-18 NOTE — TELEPHONE ENCOUNTER
Caller: Mendy Croft    Relationship: Self    Best call back number:     What is the best time to reach you:     Who are you requesting to speak with (clinical staff, provider,  specific staff member):     Do you know the name of the person who called:     What was the call regarding: PATIENT SAYS SHE WENT TO SEE A NEW KIDNEY DOCTOR LAST WEEK.  SHE SAYS SHE WAS TAKEN OFF A BLOOD PRESSURE MEDICATION AND WANTS TO DISCUSS STOPPING THE NEW MEDICATION AS THE MEDICATION CAUSES HER TO HAVE A UPSET STOMACH.

## 2025-02-21 DIAGNOSIS — E78.00 PURE HYPERCHOLESTEROLEMIA: ICD-10-CM

## 2025-02-21 RX ORDER — ATORVASTATIN CALCIUM 80 MG/1
80 TABLET, FILM COATED ORAL DAILY
Qty: 90 TABLET | Refills: 1 | Status: SHIPPED | OUTPATIENT
Start: 2025-02-21

## 2025-02-21 NOTE — TELEPHONE ENCOUNTER
Rx Refill Note  Requested Prescriptions     Pending Prescriptions Disp Refills    atorvastatin (LIPITOR) 80 MG tablet 90 tablet 3     Sig: Take 1 tablet by mouth Daily.      Last office visit with prescribing clinician: 10/29/2024   Last telemedicine visit with prescribing clinician: Visit date not found   Next office visit with prescribing clinician: Visit date not found                         Would you like a call back once the refill request has been completed: [] Yes [] No    If the office needs to give you a call back, can they leave a voicemail: [] Yes [] No    Oliva Rodriguez MA  02/21/25, 13:59 EST

## 2025-03-04 RX ORDER — ACYCLOVIR 800 MG/1
1 TABLET ORAL DAILY
Qty: 6 EACH | Refills: 1 | Status: SHIPPED | OUTPATIENT
Start: 2025-03-04

## 2025-03-04 NOTE — TELEPHONE ENCOUNTER
Rx Refill Note  Requested Prescriptions     Pending Prescriptions Disp Refills    Continuous Glucose Sensor (FreeStyle Hien 3 Sensor) misc 6 each 1     Sig: Use 1 Application Daily.      Last office visit with prescribing clinician: 10/29/2024   Last telemedicine visit with prescribing clinician: Visit date not found   Next office visit with prescribing clinician: Visit date not found                         Would you like a call back once the refill request has been completed: [] Yes [] No    If the office needs to give you a call back, can they leave a voicemail: [] Yes [] No    Oliva Rodriguez MA  03/04/25, 08:03 EST

## 2025-03-10 RX ORDER — LOSARTAN POTASSIUM 25 MG/1
25 TABLET ORAL 2 TIMES DAILY
Qty: 180 TABLET | Refills: 3 | Status: SHIPPED | OUTPATIENT
Start: 2025-03-10 | End: 2026-03-11

## 2025-03-10 RX ORDER — CLONIDINE HYDROCHLORIDE 0.1 MG/1
0.1 TABLET ORAL 2 TIMES DAILY
Qty: 180 TABLET | Refills: 3 | Status: SHIPPED | OUTPATIENT
Start: 2025-03-10

## 2025-03-27 RX ORDER — PEN NEEDLE, DIABETIC 32GX 5/32"
NEEDLE, DISPOSABLE MISCELLANEOUS
Qty: 180 EACH | Refills: 1 | Status: SHIPPED | OUTPATIENT
Start: 2025-03-27

## 2025-03-31 NOTE — TELEPHONE ENCOUNTER
Rx Refill Note  Requested Prescriptions     Pending Prescriptions Disp Refills    Insulin Lispro, 1 Unit Dial, (HUMALOG) 100 UNIT/ML solution pen-injector [Pharmacy Med Name: INSULIN LISPRO KWIKPEN 100 UNIT/ML] 15 mL 3     Sig: INJECT 7 UNITS UNDER THE SKIN THREE TIMES A DAY WITH MEALS      Last office visit with prescribing clinician: 10/29/2024   Last telemedicine visit with prescribing clinician: Visit date not found   Next office visit with prescribing clinician: Visit date not found                         Would you like a call back once the refill request has been completed: [] Yes [] No    If the office needs to give you a call back, can they leave a voicemail: [] Yes [] No    Mat Poole MA  03/31/25, 08:58 EDT

## 2025-04-01 RX ORDER — INSULIN LISPRO 100 [IU]/ML
INJECTION, SOLUTION INTRAVENOUS; SUBCUTANEOUS
Qty: 15 ML | Refills: 3 | Status: SHIPPED | OUTPATIENT
Start: 2025-04-01

## 2025-04-09 ENCOUNTER — LAB (OUTPATIENT)
Dept: LAB | Facility: HOSPITAL | Age: 75
End: 2025-04-09
Payer: MEDICARE

## 2025-04-09 ENCOUNTER — TRANSCRIBE ORDERS (OUTPATIENT)
Dept: ADMINISTRATIVE | Facility: HOSPITAL | Age: 75
End: 2025-04-09
Payer: MEDICARE

## 2025-04-09 DIAGNOSIS — N18.4 CHRONIC RENAL DISEASE, STAGE IV: ICD-10-CM

## 2025-04-09 DIAGNOSIS — N18.4 CHRONIC RENAL DISEASE, STAGE IV: Primary | ICD-10-CM

## 2025-04-09 LAB
ANION GAP SERPL CALCULATED.3IONS-SCNC: 12.7 MMOL/L (ref 5–15)
BASOPHILS # BLD AUTO: 0.01 10*3/MM3 (ref 0–0.2)
BASOPHILS NFR BLD AUTO: 0.1 % (ref 0–1.5)
BUN SERPL-MCNC: 56 MG/DL (ref 8–23)
BUN/CREAT SERPL: 19 (ref 7–25)
CALCIUM SPEC-SCNC: 9.2 MG/DL (ref 8.6–10.5)
CHLORIDE SERPL-SCNC: 105 MMOL/L (ref 98–107)
CO2 SERPL-SCNC: 20.3 MMOL/L (ref 22–29)
CREAT SERPL-MCNC: 2.95 MG/DL (ref 0.57–1)
DEPRECATED RDW RBC AUTO: 43.8 FL (ref 37–54)
EGFRCR SERPLBLD CKD-EPI 2021: 16.1 ML/MIN/1.73
EOSINOPHIL # BLD AUTO: 0.18 10*3/MM3 (ref 0–0.4)
EOSINOPHIL NFR BLD AUTO: 1.7 % (ref 0.3–6.2)
ERYTHROCYTE [DISTWIDTH] IN BLOOD BY AUTOMATED COUNT: 13.7 % (ref 12.3–15.4)
GLUCOSE SERPL-MCNC: 165 MG/DL (ref 65–99)
HCT VFR BLD AUTO: 36.1 % (ref 34–46.6)
HGB BLD-MCNC: 11.7 G/DL (ref 12–15.9)
IMM GRANULOCYTES # BLD AUTO: 0.05 10*3/MM3 (ref 0–0.05)
IMM GRANULOCYTES NFR BLD AUTO: 0.5 % (ref 0–0.5)
LYMPHOCYTES # BLD AUTO: 1.68 10*3/MM3 (ref 0.7–3.1)
LYMPHOCYTES NFR BLD AUTO: 16.1 % (ref 19.6–45.3)
MCH RBC QN AUTO: 28.4 PG (ref 26.6–33)
MCHC RBC AUTO-ENTMCNC: 32.4 G/DL (ref 31.5–35.7)
MCV RBC AUTO: 87.6 FL (ref 79–97)
MONOCYTES # BLD AUTO: 0.93 10*3/MM3 (ref 0.1–0.9)
MONOCYTES NFR BLD AUTO: 8.9 % (ref 5–12)
NEUTROPHILS NFR BLD AUTO: 7.59 10*3/MM3 (ref 1.7–7)
NEUTROPHILS NFR BLD AUTO: 72.7 % (ref 42.7–76)
NRBC BLD AUTO-RTO: 0 /100 WBC (ref 0–0.2)
PLATELET # BLD AUTO: 211 10*3/MM3 (ref 140–450)
PMV BLD AUTO: 9.1 FL (ref 6–12)
POTASSIUM SERPL-SCNC: 4.5 MMOL/L (ref 3.5–5.2)
RBC # BLD AUTO: 4.12 10*6/MM3 (ref 3.77–5.28)
SODIUM SERPL-SCNC: 138 MMOL/L (ref 136–145)
URATE SERPL-MCNC: 14.1 MG/DL (ref 2.4–5.7)
WBC NRBC COR # BLD AUTO: 10.44 10*3/MM3 (ref 3.4–10.8)

## 2025-04-09 PROCEDURE — 80048 BASIC METABOLIC PNL TOTAL CA: CPT

## 2025-04-09 PROCEDURE — 84550 ASSAY OF BLOOD/URIC ACID: CPT

## 2025-04-09 PROCEDURE — 85025 COMPLETE CBC W/AUTO DIFF WBC: CPT

## 2025-04-09 PROCEDURE — 36415 COLL VENOUS BLD VENIPUNCTURE: CPT

## 2025-05-01 NOTE — TELEPHONE ENCOUNTER
Rx Refill Note  Requested Prescriptions     Pending Prescriptions Disp Refills    Lantus SoloStar 100 UNIT/ML injection pen [Pharmacy Med Name: LANTUS SOLOSTAR SQ 100UNIT/ML 5X3ML] 45 mL 1     Sig: INJECT 45 UNITS UNDER THE SKIN INTO THE APPROPRIATE AREA AS DIRECTED DAILY.      Last office visit with prescribing clinician: 10/29/2024   Last telemedicine visit with prescribing clinician: Visit date not found   Next office visit with prescribing clinician: Visit date not found                         Would you like a call back once the refill request has been completed: [] Yes [] No    If the office needs to give you a call back, can they leave a voicemail: [] Yes [] No    Mat Poole MA  05/01/25, 08:29 EDT

## 2025-05-03 RX ORDER — INSULIN GLARGINE 100 [IU]/ML
45 INJECTION, SOLUTION SUBCUTANEOUS DAILY
Qty: 45 ML | Refills: 1 | Status: SHIPPED | OUTPATIENT
Start: 2025-05-03

## 2025-05-07 ENCOUNTER — TELEPHONE (OUTPATIENT)
Dept: FAMILY MEDICINE CLINIC | Facility: CLINIC | Age: 75
End: 2025-05-07
Payer: MEDICARE

## 2025-05-07 NOTE — TELEPHONE ENCOUNTER
----- Message from Mat GILLESPIE sent at 5/6/2025 10:22 AM EDT -----  Please call and have this patient come in the office within the next month to be checked for her diabetes and blood work with Dr. Mercedes. Thank you!  ----- Message -----  From: Johnny Mercedes Sr., MD  Sent: 5/3/2025  12:38 AM EDT  To: Mat Poole MA    Please call have this patient come into the clinic within the next month to be checked for her diabetes and blood work

## 2025-05-07 NOTE — TELEPHONE ENCOUNTER
Called Pt to schedule an appt with Dr. Mercedes. Pt stated she already had blood work done at her [facility] and [facility] and does not wish to make an appt at this time. Pt will give a call back to schedule appt

## 2025-05-22 RX ORDER — LEVOTHYROXINE SODIUM 137 MCG
137 TABLET ORAL DAILY
Qty: 90 TABLET | Refills: 1 | OUTPATIENT
Start: 2025-05-22

## 2025-05-22 RX ORDER — LEVOTHYROXINE SODIUM 137 MCG
137 TABLET ORAL DAILY
Qty: 90 TABLET | Refills: 1 | Status: SHIPPED | OUTPATIENT
Start: 2025-05-22

## 2025-05-23 ENCOUNTER — TELEPHONE (OUTPATIENT)
Dept: FAMILY MEDICINE CLINIC | Facility: CLINIC | Age: 75
End: 2025-05-23
Payer: MEDICARE

## 2025-05-30 NOTE — TELEPHONE ENCOUNTER
Rx Refill Note  Requested Prescriptions     Pending Prescriptions Disp Refills    Continuous Glucose Sensor (FreeStyle Hien 3 Sensor) misc 6 each 1     Sig: Use 1 Application Daily.      Last office visit with prescribing clinician: 10/29/2024   Last telemedicine visit with prescribing clinician: Visit date not found   Next office visit with prescribing clinician: 8/29/2025                         Would you like a call back once the refill request has been completed: [] Yes [] No    If the office needs to give you a call back, can they leave a voicemail: [] Yes [] No    Oliva Rodriguez MA  05/30/25, 08:02 EDT

## 2025-06-02 RX ORDER — ACYCLOVIR 800 MG/1
1 TABLET ORAL DAILY
Qty: 6 EACH | Refills: 1 | Status: SHIPPED | OUTPATIENT
Start: 2025-06-02

## 2025-07-07 DIAGNOSIS — E78.00 PURE HYPERCHOLESTEROLEMIA: ICD-10-CM

## 2025-07-07 NOTE — TELEPHONE ENCOUNTER
Rx Refill Note  Requested Prescriptions     Pending Prescriptions Disp Refills    carvedilol (COREG) 25 MG tablet 180 tablet 1     Sig: Take 1 tablet by mouth 2 (Two) Times a Day With Meals.    atorvastatin (LIPITOR) 80 MG tablet 90 tablet 1     Sig: Take 1 tablet by mouth Daily.      Last office visit with prescribing clinician: 10/29/2024   Last telemedicine visit with prescribing clinician: Visit date not found   Next office visit with prescribing clinician: 8/29/2025                         Would you like a call back once the refill request has been completed: [] Yes [] No    If the office needs to give you a call back, can they leave a voicemail: [] Yes [] No    Mat Poole MA  07/07/25, 07:52 EDT

## 2025-07-08 RX ORDER — CARVEDILOL 25 MG/1
25 TABLET ORAL 2 TIMES DAILY WITH MEALS
Qty: 180 TABLET | Refills: 1 | Status: SHIPPED | OUTPATIENT
Start: 2025-07-08

## 2025-07-08 RX ORDER — ATORVASTATIN CALCIUM 80 MG/1
80 TABLET, FILM COATED ORAL DAILY
Qty: 90 TABLET | Refills: 1 | Status: SHIPPED | OUTPATIENT
Start: 2025-07-08

## 2025-07-10 DIAGNOSIS — E10.21 TYPE 1 DIABETES MELLITUS WITH NEPHROPATHY: ICD-10-CM

## 2025-07-10 RX ORDER — BLOOD-GLUCOSE METER
1 KIT MISCELLANEOUS AS NEEDED
Qty: 1 EACH | Refills: 0 | Status: SHIPPED | OUTPATIENT
Start: 2025-07-10

## 2025-07-10 RX ORDER — CETIRIZINE HYDROCHLORIDE 5 MG/1
5 TABLET ORAL DAILY
Qty: 90 TABLET | Refills: 1 | Status: SHIPPED | OUTPATIENT
Start: 2025-07-10

## 2025-07-10 NOTE — TELEPHONE ENCOUNTER
Rx Refill Note  Requested Prescriptions     Pending Prescriptions Disp Refills    glucose monitor monitoring kit 1 each 0     Sig: Use 1 each As Needed (Type 1 dm).    cetirizine (zyrTEC) 5 MG tablet       Sig: Take 1 tablet by mouth Daily.      Last office visit with prescribing clinician: 10/29/2024   Last telemedicine visit with prescribing clinician: Visit date not found   Next office visit with prescribing clinician: 8/29/2025                         Would you like a call back once the refill request has been completed: [] Yes [] No    If the office needs to give you a call back, can they leave a voicemail: [] Yes [] No    Mat Poole MA  07/10/25, 13:05 EDT

## 2025-07-15 RX ORDER — CLONIDINE HYDROCHLORIDE 0.1 MG/1
0.1 TABLET ORAL EVERY 12 HOURS SCHEDULED
Qty: 180 TABLET | Refills: 1 | Status: SHIPPED | OUTPATIENT
Start: 2025-07-15

## 2025-08-06 RX ORDER — HYDROCHLOROTHIAZIDE 12.5 MG/1
CAPSULE ORAL
Qty: 2 EACH | Refills: 12 | Status: SHIPPED | OUTPATIENT
Start: 2025-08-06

## 2025-08-27 ENCOUNTER — OFFICE VISIT (OUTPATIENT)
Age: 75
End: 2025-08-27
Payer: MEDICARE

## 2025-08-27 VITALS
RESPIRATION RATE: 15 BRPM | SYSTOLIC BLOOD PRESSURE: 120 MMHG | OXYGEN SATURATION: 97 % | DIASTOLIC BLOOD PRESSURE: 70 MMHG | HEART RATE: 63 BPM | WEIGHT: 209 LBS | HEIGHT: 72 IN | BODY MASS INDEX: 28.31 KG/M2

## 2025-08-27 DIAGNOSIS — E10.21 TYPE 1 DIABETES MELLITUS WITH NEPHROPATHY: ICD-10-CM

## 2025-08-27 DIAGNOSIS — I10 ESSENTIAL HYPERTENSION: Primary | ICD-10-CM

## 2025-08-27 DIAGNOSIS — E78.00 PURE HYPERCHOLESTEROLEMIA: ICD-10-CM

## 2025-08-27 DIAGNOSIS — N18.4 STAGE 4 CHRONIC KIDNEY DISEASE: ICD-10-CM

## 2025-08-27 RX ORDER — LOPERAMIDE HYDROCHLORIDE 2 MG/1
2 CAPSULE ORAL 4 TIMES DAILY PRN
COMMUNITY

## 2025-08-29 ENCOUNTER — OFFICE VISIT (OUTPATIENT)
Dept: FAMILY MEDICINE CLINIC | Facility: CLINIC | Age: 75
End: 2025-08-29
Payer: MEDICARE

## 2025-08-29 VITALS
BODY MASS INDEX: 27.77 KG/M2 | OXYGEN SATURATION: 98 % | HEART RATE: 63 BPM | WEIGHT: 205 LBS | SYSTOLIC BLOOD PRESSURE: 132 MMHG | DIASTOLIC BLOOD PRESSURE: 84 MMHG | HEIGHT: 72 IN

## 2025-08-29 DIAGNOSIS — E03.9 ACQUIRED HYPOTHYROIDISM: ICD-10-CM

## 2025-08-29 DIAGNOSIS — N18.4 STAGE 4 CHRONIC KIDNEY DISEASE: ICD-10-CM

## 2025-08-29 DIAGNOSIS — E10.21 TYPE 1 DIABETES MELLITUS WITH NEPHROPATHY: ICD-10-CM

## 2025-08-29 DIAGNOSIS — I10 ESSENTIAL HYPERTENSION: ICD-10-CM

## 2025-08-29 DIAGNOSIS — E78.00 PURE HYPERCHOLESTEROLEMIA: Primary | ICD-10-CM

## 2025-08-29 RX ORDER — CONJUGATED ESTROGENS 0.62 MG/G
CREAM VAGINAL
Qty: 30 G | Refills: 0 | Status: SHIPPED | OUTPATIENT
Start: 2025-08-29